# Patient Record
Sex: MALE | Race: WHITE | NOT HISPANIC OR LATINO | Employment: FULL TIME | URBAN - METROPOLITAN AREA
[De-identification: names, ages, dates, MRNs, and addresses within clinical notes are randomized per-mention and may not be internally consistent; named-entity substitution may affect disease eponyms.]

---

## 2018-10-29 ENCOUNTER — HOSPITAL ENCOUNTER (OUTPATIENT)
Dept: RADIOLOGY | Facility: HOSPITAL | Age: 38
Discharge: HOME | End: 2018-10-29
Attending: FAMILY MEDICINE
Payer: COMMERCIAL

## 2018-10-29 ENCOUNTER — OFFICE VISIT (OUTPATIENT)
Dept: URGENT CARE | Facility: CLINIC | Age: 38
End: 2018-10-29
Payer: COMMERCIAL

## 2018-10-29 VITALS
HEART RATE: 76 BPM | DIASTOLIC BLOOD PRESSURE: 67 MMHG | HEIGHT: 73 IN | WEIGHT: 160 LBS | SYSTOLIC BLOOD PRESSURE: 109 MMHG | TEMPERATURE: 98 F | BODY MASS INDEX: 21.2 KG/M2 | OXYGEN SATURATION: 98 %

## 2018-10-29 DIAGNOSIS — R10.31 RIGHT GROIN PAIN: ICD-10-CM

## 2018-10-29 DIAGNOSIS — Z76.89 ENCOUNTER TO ESTABLISH CARE: ICD-10-CM

## 2018-10-29 DIAGNOSIS — R10.31 RIGHT GROIN PAIN: Primary | ICD-10-CM

## 2018-10-29 PROCEDURE — 99203 OFFICE O/P NEW LOW 30 MIN: CPT | Mod: S$GLB,,, | Performed by: FAMILY MEDICINE

## 2018-10-29 PROCEDURE — 76705 ECHO EXAM OF ABDOMEN: CPT | Mod: TC

## 2018-10-29 PROCEDURE — 3008F BODY MASS INDEX DOCD: CPT | Mod: CPTII,S$GLB,, | Performed by: FAMILY MEDICINE

## 2018-10-29 PROCEDURE — 76705 ECHO EXAM OF ABDOMEN: CPT | Mod: 26,,, | Performed by: INTERNAL MEDICINE

## 2018-10-29 NOTE — PATIENT INSTRUCTIONS
Groin Strain (Adult)     A groin strain is a stretching or partial tearing of the muscle in the lower abdomen or upper thigh. This may happen because of too much coughing, heavy lifting, or active sports. The pain may last for several days to weeks, depending on how bad the stretch or tear is. It will generally get better with rest, ice, and anti-inflammatory medicines.  A groin strain can lead to a groin hernia. This is also called an inguinal hernia. A hernia is a complete tear of the abdominal muscle. This allows fat or the intestines to bulge out and create a visible bump just above the thigh crease. This is a more serious problem and may need surgery to repair it. When you lie down, the bump should get smaller or disappear completely. If it doesnt, and you are not able to flatten it with your hand, you need medical attention right away.  Home care  · Avoid heavy lifting, straining, or any activities that cause groin pain.  · You may use over-the-counter pain medicine to control pain, unless another pain medicine was prescribed. If you have chronic liver or kidney disease or ever had a stomach ulcer or GI bleeding, talk with your healthcare provider before using these medicines.  Follow-up care  Follow up with your healthcare provider, or as advised. Make an appointment with your healthcare provider if you develop a bump in the area of the groin strain.  When to seek medical advice  Call your healthcare provider right away if any of these occur:  · Increasing pain in the area of the groin strain  · Tender bump just above the groin crease that does not flatten when you lie down or press on it  · Overall abdominal swelling or pain  · Fever of 100.4°F (38°C) or above lasting for 24 to 48 hours  · Repeated vomiting  · Pain that moves to the lower right abdomen, just below the waistline, or spreads to the back  Date Last Reviewed: 11/19/2015  © 1591-5576 [email or web address] [address] [place] [address], [place] [address]. All rights reserved. This information is not intended as a substitute for professional medical care. Always follow your healthcare professional's instructions.    Follow up with your doctor in a few days as needed.  Return to the urgent care or go to the ER if symptoms get worse.    Jaleel Lane MD

## 2018-10-29 NOTE — PROGRESS NOTES
"Subjective:       Patient ID: Carlos Jacobson is a 38 y.o. male.    Vitals:  height is 6' 1" (1.854 m) and weight is 72.6 kg (160 lb). His temperature is 98.1 °F (36.7 °C). His blood pressure is 109/67 and his pulse is 76. His oxygen saturation is 98%.     Chief Complaint: Hernia (rt femoral area)    Mass   This is a new problem. Episode onset: 3 days ago found lump inner thigh. The problem occurs constantly. Pertinent negatives include no abdominal pain, chest pain, chills, fever, headaches, nausea, rash, sore throat or vomiting. Associated symptoms comments: Felt like a muscle pull.. The symptoms are aggravated by walking and bending (sensitive to touch.). He has tried nothing for the symptoms. The treatment provided no relief.     Review of Systems   Constitution: Negative for chills and fever.   HENT: Negative for sore throat.    Eyes: Negative for blurred vision.   Cardiovascular: Negative for chest pain.   Respiratory: Negative for shortness of breath.    Skin: Negative for rash.   Musculoskeletal: Negative for back pain and joint pain.   Gastrointestinal: Negative for abdominal pain, diarrhea, nausea and vomiting.   Neurological: Negative for headaches.   Psychiatric/Behavioral: The patient is not nervous/anxious.        Objective:      Physical Exam   Constitutional: He is oriented to person, place, and time. He appears well-developed and well-nourished.   HENT:   Head: Normocephalic and atraumatic.   Eyes: EOM are normal. Pupils are equal, round, and reactive to light.   Neck: Normal range of motion. Neck supple. No JVD present. No tracheal deviation present. No thyromegaly present.   Cardiovascular: Normal rate, regular rhythm and normal heart sounds. Exam reveals no gallop and no friction rub.   No murmur heard.  Pulmonary/Chest: Breath sounds normal. No respiratory distress. He has no wheezes. He has no rales. He exhibits no tenderness.   Abdominal: Soft. Bowel sounds are normal. He exhibits no " distension. There is no tenderness. There is no rebound and no guarding.   Right groin tenderness, no swelling, no redness, no bruise.   Musculoskeletal: Normal range of motion. He exhibits no edema, tenderness or deformity.   Lymphadenopathy:     He has no cervical adenopathy.   Neurological: He is alert and oriented to person, place, and time. He displays normal reflexes. No cranial nerve deficit. He exhibits normal muscle tone. Coordination normal.   Skin: Skin is warm. Capillary refill takes less than 2 seconds. No rash noted. No erythema. No pallor.   Psychiatric: He has a normal mood and affect. His behavior is normal. Judgment and thought content normal.   Vitals reviewed.      Assessment:       1. Right groin pain    2. Encounter to establish care        Plan:         Right groin pain  -     US Abdomen Limited; Future; Expected date: 10/29/2018    Encounter to establish care  -     Ambulatory consult to Family Practice          Patient Instructions     Groin Strain (Adult)     A groin strain is a stretching or partial tearing of the muscle in the lower abdomen or upper thigh. This may happen because of too much coughing, heavy lifting, or active sports. The pain may last for several days to weeks, depending on how bad the stretch or tear is. It will generally get better with rest, ice, and anti-inflammatory medicines.  A groin strain can lead to a groin hernia. This is also called an inguinal hernia. A hernia is a complete tear of the abdominal muscle. This allows fat or the intestines to bulge out and create a visible bump just above the thigh crease. This is a more serious problem and may need surgery to repair it. When you lie down, the bump should get smaller or disappear completely. If it doesnt, and you are not able to flatten it with your hand, you need medical attention right away.  Home care  · Avoid heavy lifting, straining, or any activities that cause groin pain.  · You may use over-the-counter  pain medicine to control pain, unless another pain medicine was prescribed. If you have chronic liver or kidney disease or ever had a stomach ulcer or GI bleeding, talk with your healthcare provider before using these medicines.  Follow-up care  Follow up with your healthcare provider, or as advised. Make an appointment with your healthcare provider if you develop a bump in the area of the groin strain.  When to seek medical advice  Call your healthcare provider right away if any of these occur:  · Increasing pain in the area of the groin strain  · Tender bump just above the groin crease that does not flatten when you lie down or press on it  · Overall abdominal swelling or pain  · Fever of 100.4°F (38°C) or above lasting for 24 to 48 hours  · Repeated vomiting  · Pain that moves to the lower right abdomen, just below the waistline, or spreads to the back  Date Last Reviewed: 11/19/2015  © 5352-7952 [employer]. [address] [place] [address], [place] [address]. All rights reserved. This information is not intended as a substitute for professional medical care. Always follow your healthcare professional's instructions.    Follow up with your doctor in a few days as needed.  Return to the urgent care or go to the ER if symptoms get worse.    Jaleel Lane MD

## 2020-12-11 ENCOUNTER — OFFICE VISIT (OUTPATIENT)
Dept: URGENT CARE | Facility: CLINIC | Age: 40
End: 2020-12-11
Payer: COMMERCIAL

## 2020-12-11 VITALS
SYSTOLIC BLOOD PRESSURE: 113 MMHG | HEART RATE: 60 BPM | TEMPERATURE: 98 F | HEIGHT: 73 IN | BODY MASS INDEX: 21.2 KG/M2 | WEIGHT: 160 LBS | DIASTOLIC BLOOD PRESSURE: 63 MMHG | OXYGEN SATURATION: 97 % | RESPIRATION RATE: 18 BRPM

## 2020-12-11 DIAGNOSIS — Z11.59 ENCOUNTER FOR SCREENING FOR OTHER VIRAL DISEASES: Primary | ICD-10-CM

## 2020-12-11 LAB — SARS-COV-2 IGG SERPLBLD QL IA.RAPID: NEGATIVE

## 2020-12-11 PROCEDURE — 99211 PR OFFICE/OUTPT VISIT, EST, LEVL I: ICD-10-PCS | Mod: S$GLB,,, | Performed by: NURSE PRACTITIONER

## 2020-12-11 PROCEDURE — 86769 SARS-COV-2 COVID-19 ANTIBODY: CPT

## 2020-12-11 PROCEDURE — 99211 OFF/OP EST MAY X REQ PHY/QHP: CPT | Mod: S$GLB,,, | Performed by: NURSE PRACTITIONER

## 2020-12-11 RX ORDER — ESCITALOPRAM OXALATE 5 MG/1
5 TABLET ORAL DAILY
COMMUNITY
Start: 2020-12-08 | End: 2022-10-17 | Stop reason: CLARIF

## 2020-12-11 NOTE — PATIENT INSTRUCTIONS
ANTIBODY TESTING           What does the antibody test tell me?  The antibody test is a blood test that determines if a persons immune system has created antibodies in  response to COVID-19. Presence of the antibody indicates the individual has been previously infected  with COVID-19.  It is important to note that this test does not prove that a person is immune to future infection with  COVID-19. Because this virus is new, there is not enough information at this time to determine what  defines COVID-19 immunity and how long immunity may last.  We understand a test like this could create a false sense of security. It is critical that everyone,  regardless of test status or result, continues to follow the latest social distancing, PPE protection and  infection control measures.     Those with a positive test should be aware that they have been infected. These individuals are still  required to wear appropriate PPE as advised throughout this COVID-19 pandemic. Those with a negative  test should be aware that they have not been exposed or developed antibodies to COVID-19. They  should maintain the guidance on appropriate PPE as advised throughout this COVID-19 pandemic.           What is the turnaround time of the antibody test?  The antibody test results are usually provided within 24-36 hours of collection, depending on the testing  Location.           Nasir heard that these tests are unreliable?  This test has been vetted through our infectious disease and pathology leadership. This test has not  been approved by the U.S. Food and Drug Administration (FDA). This test is currently commercially  available under the Emergency Use Authorization, meaning that the FDA is allowing it during this public  health emergency. The COVID-19 virus is new and there is no perfect test.           If my antibody test is positive, is there any medication or treatment I should seek?  No, at this time, there is no definitive therapy  "being recommended or used for patients with positive  antibody test. Regardless of test status or result, you should continue to follow the latest social  distancing, PPE protection and infection control measures.        How will I get my results?  Results will sent to your [facility] patient portal where you can view them. You can download the "SaveMeeting" steffany in your smart phone's Steffany store.  You can also visit  [email or web address]  to set up your portal. You may contact [email or web address] or   [facility] Support Line at [phone] for assistance.       "

## 2020-12-11 NOTE — PROGRESS NOTES
"Subjective:       Patient ID: Carlos Jacobson is a 40 y.o. male.    Vitals:  height is 6' 1" (1.854 m) and weight is 72.6 kg (160 lb). His temperature is 98.2 °F (36.8 °C). His blood pressure is 113/63 and his pulse is 60. His respiration is 18 and oxygen saturation is 97%.     Chief Complaint: COVID-19 Concerns (Anitbody test)    Pt here for Antibody test. Was exposed 3 weeks ago. No symptoms today    ROS    Objective:      Physical Exam    Counseled patient and answered questions in regards to COVID-19 testing and diagnosis.  Assessment:       1. Encounter for screening for other viral diseases        Plan:         Encounter for screening for other viral diseases  -     COVID-19 (SARS CoV-2) IgG Antibody           "

## 2021-04-28 ENCOUNTER — PATIENT MESSAGE (OUTPATIENT)
Dept: RESEARCH | Facility: HOSPITAL | Age: 41
End: 2021-04-28

## 2022-10-17 ENCOUNTER — OFFICE VISIT (OUTPATIENT)
Dept: PRIMARY CARE CLINIC | Facility: CLINIC | Age: 42
End: 2022-10-17
Payer: COMMERCIAL

## 2022-10-17 VITALS
HEIGHT: 73 IN | BODY MASS INDEX: 23.43 KG/M2 | TEMPERATURE: 98 F | HEART RATE: 64 BPM | DIASTOLIC BLOOD PRESSURE: 72 MMHG | SYSTOLIC BLOOD PRESSURE: 106 MMHG | WEIGHT: 176.81 LBS | OXYGEN SATURATION: 97 % | RESPIRATION RATE: 16 BRPM

## 2022-10-17 DIAGNOSIS — Z11.4 SCREENING FOR HIV (HUMAN IMMUNODEFICIENCY VIRUS): ICD-10-CM

## 2022-10-17 DIAGNOSIS — F41.1 GAD (GENERALIZED ANXIETY DISORDER): ICD-10-CM

## 2022-10-17 DIAGNOSIS — Z00.00 ANNUAL PHYSICAL EXAM: Primary | ICD-10-CM

## 2022-10-17 DIAGNOSIS — Z13.6 ENCOUNTER FOR SCREENING FOR CARDIOVASCULAR DISORDERS: ICD-10-CM

## 2022-10-17 DIAGNOSIS — Z23 NEED FOR VACCINATION: ICD-10-CM

## 2022-10-17 PROCEDURE — 1159F PR MEDICATION LIST DOCUMENTED IN MEDICAL RECORD: ICD-10-PCS | Mod: CPTII,S$GLB,, | Performed by: FAMILY MEDICINE

## 2022-10-17 PROCEDURE — 99386 PREV VISIT NEW AGE 40-64: CPT | Mod: 25,S$GLB,, | Performed by: FAMILY MEDICINE

## 2022-10-17 PROCEDURE — 3078F DIAST BP <80 MM HG: CPT | Mod: CPTII,S$GLB,, | Performed by: FAMILY MEDICINE

## 2022-10-17 PROCEDURE — 99999 PR PBB SHADOW E&M-EST. PATIENT-LVL IV: CPT | Mod: PBBFAC,,, | Performed by: FAMILY MEDICINE

## 2022-10-17 PROCEDURE — 90472 TDAP VACCINE GREATER THAN OR EQUAL TO 7YO IM: ICD-10-PCS | Mod: S$GLB,,, | Performed by: FAMILY MEDICINE

## 2022-10-17 PROCEDURE — 90471 FLU VACCINE (QUAD) GREATER THAN OR EQUAL TO 3YO PRESERVATIVE FREE IM: ICD-10-PCS | Mod: S$GLB,,, | Performed by: FAMILY MEDICINE

## 2022-10-17 PROCEDURE — 90686 IIV4 VACC NO PRSV 0.5 ML IM: CPT | Mod: S$GLB,,, | Performed by: FAMILY MEDICINE

## 2022-10-17 PROCEDURE — 99999 PR PBB SHADOW E&M-EST. PATIENT-LVL IV: ICD-10-PCS | Mod: PBBFAC,,, | Performed by: FAMILY MEDICINE

## 2022-10-17 PROCEDURE — 3074F PR MOST RECENT SYSTOLIC BLOOD PRESSURE < 130 MM HG: ICD-10-PCS | Mod: CPTII,S$GLB,, | Performed by: FAMILY MEDICINE

## 2022-10-17 PROCEDURE — 99386 PR PREVENTIVE VISIT,NEW,40-64: ICD-10-PCS | Mod: 25,S$GLB,, | Performed by: FAMILY MEDICINE

## 2022-10-17 PROCEDURE — 3078F PR MOST RECENT DIASTOLIC BLOOD PRESSURE < 80 MM HG: ICD-10-PCS | Mod: CPTII,S$GLB,, | Performed by: FAMILY MEDICINE

## 2022-10-17 PROCEDURE — 1160F PR REVIEW ALL MEDS BY PRESCRIBER/CLIN PHARMACIST DOCUMENTED: ICD-10-PCS | Mod: CPTII,S$GLB,, | Performed by: FAMILY MEDICINE

## 2022-10-17 PROCEDURE — 90715 TDAP VACCINE GREATER THAN OR EQUAL TO 7YO IM: ICD-10-PCS | Mod: S$GLB,,, | Performed by: FAMILY MEDICINE

## 2022-10-17 PROCEDURE — 93005 EKG 12-LEAD: ICD-10-PCS | Mod: S$GLB,,, | Performed by: FAMILY MEDICINE

## 2022-10-17 PROCEDURE — 93010 ELECTROCARDIOGRAM REPORT: CPT | Mod: S$GLB,,, | Performed by: INTERNAL MEDICINE

## 2022-10-17 PROCEDURE — 93010 EKG 12-LEAD: ICD-10-PCS | Mod: S$GLB,,, | Performed by: INTERNAL MEDICINE

## 2022-10-17 PROCEDURE — 1160F RVW MEDS BY RX/DR IN RCRD: CPT | Mod: CPTII,S$GLB,, | Performed by: FAMILY MEDICINE

## 2022-10-17 PROCEDURE — 93005 ELECTROCARDIOGRAM TRACING: CPT | Mod: S$GLB,,, | Performed by: FAMILY MEDICINE

## 2022-10-17 PROCEDURE — 90715 TDAP VACCINE 7 YRS/> IM: CPT | Mod: S$GLB,,, | Performed by: FAMILY MEDICINE

## 2022-10-17 PROCEDURE — 90471 IMMUNIZATION ADMIN: CPT | Mod: S$GLB,,, | Performed by: FAMILY MEDICINE

## 2022-10-17 PROCEDURE — 1159F MED LIST DOCD IN RCRD: CPT | Mod: CPTII,S$GLB,, | Performed by: FAMILY MEDICINE

## 2022-10-17 PROCEDURE — 90686 FLU VACCINE (QUAD) GREATER THAN OR EQUAL TO 3YO PRESERVATIVE FREE IM: ICD-10-PCS | Mod: S$GLB,,, | Performed by: FAMILY MEDICINE

## 2022-10-17 PROCEDURE — 3074F SYST BP LT 130 MM HG: CPT | Mod: CPTII,S$GLB,, | Performed by: FAMILY MEDICINE

## 2022-10-17 PROCEDURE — 90472 IMMUNIZATION ADMIN EACH ADD: CPT | Mod: S$GLB,,, | Performed by: FAMILY MEDICINE

## 2022-10-17 RX ORDER — IBUPROFEN 100 MG/5ML
1000 SUSPENSION, ORAL (FINAL DOSE FORM) ORAL DAILY
COMMUNITY

## 2022-10-17 RX ORDER — ESCITALOPRAM OXALATE 10 MG/1
15 TABLET ORAL DAILY
COMMUNITY
End: 2022-10-17 | Stop reason: SDUPTHER

## 2022-10-17 RX ORDER — ESCITALOPRAM OXALATE 10 MG/1
15 TABLET ORAL DAILY
Qty: 135 TABLET | Refills: 4 | Status: SHIPPED | OUTPATIENT
Start: 2022-10-17

## 2022-10-17 RX ORDER — MULTIVITAMIN
1 TABLET ORAL DAILY
COMMUNITY

## 2022-10-17 NOTE — PROGRESS NOTES
"Subjective:       Patient ID: Carlos Jacobson is a 42 y.o. male.    Chief Complaint: Establish Care    Here to establish care.  Has not had a checkup in a little over a year.  Has been stable on current dose of Lexapro, originally on 10 milligrams, feels better on 15 milligrams daily.  No adverse side effects.  No recent illness or injury.  Remote history of C diff colitis in 2014, no issues since then.    Review of Systems   Constitutional:  Negative for chills, fatigue and fever.   HENT:  Negative for congestion.    Eyes:  Negative for visual disturbance.   Respiratory:  Negative for cough and shortness of breath.    Cardiovascular:  Negative for chest pain.   Gastrointestinal:  Negative for abdominal pain, nausea and vomiting.   Genitourinary:  Negative for difficulty urinating.   Musculoskeletal:  Negative for arthralgias.   Skin:  Negative for rash.   Allergic/Immunologic: Negative for immunocompromised state.   Neurological:  Negative for dizziness.   Hematological:  Does not bruise/bleed easily.   Psychiatric/Behavioral:  Negative for sleep disturbance.      Objective:      Vitals:    10/17/22 1331   BP: 106/72   BP Location: Right arm   Patient Position: Sitting   BP Method: Medium (Manual)   Pulse: 64   Resp: 16   Temp: 98.2 °F (36.8 °C)   TempSrc: Temporal   SpO2: 97%   Weight: 80.2 kg (176 lb 12.9 oz)   Height: 6' 1" (1.854 m)     Physical Exam  Vitals and nursing note reviewed.   Constitutional:       General: He is not in acute distress.     Appearance: Normal appearance. He is well-developed.   HENT:      Head: Normocephalic and atraumatic.      Right Ear: Tympanic membrane and external ear normal.      Left Ear: Tympanic membrane and external ear normal.      Mouth/Throat:      Mouth: Mucous membranes are moist.      Pharynx: Oropharynx is clear.   Neck:      Vascular: No carotid bruit.   Cardiovascular:      Rate and Rhythm: Normal rate and regular rhythm.      Heart sounds: Normal heart sounds. "   Pulmonary:      Effort: Pulmonary effort is normal.      Breath sounds: Normal breath sounds.   Musculoskeletal:      Right lower leg: No edema.      Left lower leg: No edema.   Skin:     General: Skin is warm and dry.   Neurological:      Mental Status: He is alert and oriented to person, place, and time.   Psychiatric:         Mood and Affect: Mood normal.         Behavior: Behavior normal.       Lab Results   Component Value Date    WBC 4.80 04/29/2014    HGB 13.0 (L) 04/29/2014    HCT 38.3 (L) 04/29/2014     04/29/2014    ALT 26 04/26/2014    AST 18 04/26/2014     04/29/2014    K 3.5 04/29/2014     04/29/2014    CREATININE 0.8 04/29/2014    BUN 6 04/29/2014    CO2 27 04/29/2014    INR 1.0 04/26/2014      Assessment:       1. Annual physical exam    2. SUKI (generalized anxiety disorder)    3. Screening for HIV (human immunodeficiency virus)    4. Need for vaccination    5. Encounter for screening for cardiovascular disorders          Plan:       Annual physical exam  -     CBC Auto Differential; Future; Expected date: 10/17/2022  -     Comprehensive Metabolic Panel; Future; Expected date: 10/17/2022  -     Lipid Panel; Future; Expected date: 10/17/2022  -     TSH; Future; Expected date: 10/17/2022  -     HIV 1/2 Ag/Ab (4th Gen); Future; Expected date: 10/17/2022  -     EKG 12-lead  Sinus bradycardia on EKG, otherwise normal exam.  SUKI (generalized anxiety disorder)  -     EScitalopram oxalate (LEXAPRO) 10 MG tablet; Take 1.5 tablets (15 mg total) by mouth once daily.  Dispense: 135 tablet; Refill: 4    Screening for HIV (human immunodeficiency virus)    Need for vaccination  -     Influenza - Quadrivalent *Preferred* (6 months+) (PF)  -     Tdap Vaccine    Encounter for screening for cardiovascular disorders  -     EKG 12-lead    Medication List with Changes/Refills   Current Medications    ACIDOPHILUS (LACTINEX) 100 MILLION CELL PACKET    Take 1 packet (1 each total) by mouth 2 (two) times  daily.    ASCORBIC ACID, VITAMIN C, (VITAMIN C) 1000 MG TABLET    Take 1,000 mg by mouth once daily.    MULTIVITAMIN (ONE DAILY MULTIVITAMIN) PER TABLET    Take 1 tablet by mouth once daily.   Changed and/or Refilled Medications    Modified Medication Previous Medication    ESCITALOPRAM OXALATE (LEXAPRO) 10 MG TABLET EScitalopram oxalate (LEXAPRO) 10 MG tablet       Take 1.5 tablets (15 mg total) by mouth once daily.    Take 15 mg by mouth once daily.   Discontinued Medications    ESCITALOPRAM OXALATE (LEXAPRO) 5 MG TAB    Take 5 mg by mouth once daily.    HYOSCYAMINE (ANASPAZ,LEVSIN) 0.125 MG TAB    Take 125 mcg by mouth every 4 (four) hours as needed.    ONDANSETRON (ZOFRAN) 4 MG TABLET    Take 8 mg by mouth every 8 (eight) hours.

## 2022-10-17 NOTE — PROGRESS NOTES
Verified pt ID using name and . Influenza vaccine Administered Im in right delt per physician order using aseptic technique.  Pt tolerated well with no adverse reactions noted.   Verified pt ID using name and . TDAP Vaccine Administered IM in left delt per physician order using aseptic technique.  Pt tolerated well with no adverse reactions noted.

## 2023-05-23 ENCOUNTER — TELEPHONE (OUTPATIENT)
Dept: PRIMARY CARE CLINIC | Facility: CLINIC | Age: 43
End: 2023-05-23
Payer: COMMERCIAL

## 2023-05-23 DIAGNOSIS — D49.6 BRAIN TUMOR: Primary | ICD-10-CM

## 2023-05-23 NOTE — TELEPHONE ENCOUNTER
Patient called to notify me that he has been having intermittent episodes of left foot numbness and tunnel vision for the past month or so.  Went to an urgent care in [place] for further evaluation.  Head CT was done which reportedly showed some type of brain tumor.  He will e-mail a copy of the report and we will proceed from there.

## 2023-05-24 ENCOUNTER — LAB VISIT (OUTPATIENT)
Dept: LAB | Facility: HOSPITAL | Age: 43
End: 2023-05-24
Attending: FAMILY MEDICINE
Payer: COMMERCIAL

## 2023-05-24 DIAGNOSIS — Z13.6 ENCOUNTER FOR SCREENING FOR CARDIOVASCULAR DISORDERS: ICD-10-CM

## 2023-05-24 DIAGNOSIS — Z00.00 ANNUAL PHYSICAL EXAM: ICD-10-CM

## 2023-05-24 DIAGNOSIS — Z11.4 SCREENING FOR HIV (HUMAN IMMUNODEFICIENCY VIRUS): ICD-10-CM

## 2023-05-24 LAB
ALBUMIN SERPL BCP-MCNC: 4.2 G/DL (ref 3.5–5.2)
ALP SERPL-CCNC: 60 U/L (ref 55–135)
ALT SERPL W/O P-5'-P-CCNC: 21 U/L (ref 10–44)
ANION GAP SERPL CALC-SCNC: 11 MMOL/L (ref 8–16)
AST SERPL-CCNC: 24 U/L (ref 10–40)
BASOPHILS # BLD AUTO: 0.02 K/UL (ref 0–0.2)
BASOPHILS NFR BLD: 0.5 % (ref 0–1.9)
BILIRUB SERPL-MCNC: 0.5 MG/DL (ref 0.1–1)
BUN SERPL-MCNC: 12 MG/DL (ref 6–20)
CALCIUM SERPL-MCNC: 10.1 MG/DL (ref 8.7–10.5)
CHLORIDE SERPL-SCNC: 104 MMOL/L (ref 95–110)
CHOLEST SERPL-MCNC: 239 MG/DL (ref 120–199)
CHOLEST/HDLC SERPL: 5.7 {RATIO} (ref 2–5)
CO2 SERPL-SCNC: 24 MMOL/L (ref 23–29)
CREAT SERPL-MCNC: 0.9 MG/DL (ref 0.5–1.4)
DIFFERENTIAL METHOD: NORMAL
EOSINOPHIL # BLD AUTO: 0 K/UL (ref 0–0.5)
EOSINOPHIL NFR BLD: 0.7 % (ref 0–8)
ERYTHROCYTE [DISTWIDTH] IN BLOOD BY AUTOMATED COUNT: 12.2 % (ref 11.5–14.5)
EST. GFR  (NO RACE VARIABLE): >60 ML/MIN/1.73 M^2
GLUCOSE SERPL-MCNC: 100 MG/DL (ref 70–110)
HCT VFR BLD AUTO: 46 % (ref 40–54)
HDLC SERPL-MCNC: 42 MG/DL (ref 40–75)
HDLC SERPL: 17.6 % (ref 20–50)
HGB BLD-MCNC: 15.4 G/DL (ref 14–18)
HIV 1+2 AB+HIV1 P24 AG SERPL QL IA: NORMAL
IMM GRANULOCYTES # BLD AUTO: 0.01 K/UL (ref 0–0.04)
IMM GRANULOCYTES NFR BLD AUTO: 0.2 % (ref 0–0.5)
LDLC SERPL CALC-MCNC: 155 MG/DL (ref 63–159)
LYMPHOCYTES # BLD AUTO: 1.5 K/UL (ref 1–4.8)
LYMPHOCYTES NFR BLD: 36.7 % (ref 18–48)
MCH RBC QN AUTO: 30.4 PG (ref 27–31)
MCHC RBC AUTO-ENTMCNC: 33.5 G/DL (ref 32–36)
MCV RBC AUTO: 91 FL (ref 82–98)
MONOCYTES # BLD AUTO: 0.4 K/UL (ref 0.3–1)
MONOCYTES NFR BLD: 9 % (ref 4–15)
NEUTROPHILS # BLD AUTO: 2.2 K/UL (ref 1.8–7.7)
NEUTROPHILS NFR BLD: 52.9 % (ref 38–73)
NONHDLC SERPL-MCNC: 197 MG/DL
NRBC BLD-RTO: 0 /100 WBC
PLATELET # BLD AUTO: 268 K/UL (ref 150–450)
PMV BLD AUTO: 10.3 FL (ref 9.2–12.9)
POTASSIUM SERPL-SCNC: 5.1 MMOL/L (ref 3.5–5.1)
PROT SERPL-MCNC: 7.6 G/DL (ref 6–8.4)
RBC # BLD AUTO: 5.06 M/UL (ref 4.6–6.2)
SODIUM SERPL-SCNC: 139 MMOL/L (ref 136–145)
TRIGL SERPL-MCNC: 210 MG/DL (ref 30–150)
TSH SERPL DL<=0.005 MIU/L-ACNC: 1.52 UIU/ML (ref 0.4–4)
WBC # BLD AUTO: 4.12 K/UL (ref 3.9–12.7)

## 2023-05-24 PROCEDURE — 80053 COMPREHEN METABOLIC PANEL: CPT | Performed by: FAMILY MEDICINE

## 2023-05-24 PROCEDURE — 85025 COMPLETE CBC W/AUTO DIFF WBC: CPT | Performed by: FAMILY MEDICINE

## 2023-05-24 PROCEDURE — 36415 COLL VENOUS BLD VENIPUNCTURE: CPT | Mod: PO | Performed by: FAMILY MEDICINE

## 2023-05-24 PROCEDURE — 84443 ASSAY THYROID STIM HORMONE: CPT | Performed by: FAMILY MEDICINE

## 2023-05-24 PROCEDURE — 87389 HIV-1 AG W/HIV-1&-2 AB AG IA: CPT | Performed by: FAMILY MEDICINE

## 2023-05-24 PROCEDURE — 80061 LIPID PANEL: CPT | Performed by: FAMILY MEDICINE

## 2023-05-24 NOTE — TELEPHONE ENCOUNTER
Head CT report uploaded to the chart.  MRI brain and CT of chest and abdomen ordered.  Needs imaging and fasting labs, as well as consultation with Neurosurgery after imaging is done

## 2023-05-26 ENCOUNTER — HOSPITAL ENCOUNTER (OUTPATIENT)
Dept: RADIOLOGY | Facility: HOSPITAL | Age: 43
Discharge: HOME | End: 2023-05-26
Attending: FAMILY MEDICINE
Payer: COMMERCIAL

## 2023-05-26 ENCOUNTER — ANCILLARY ORDERS (OUTPATIENT)
Dept: PRIMARY CARE CLINIC | Facility: CLINIC | Age: 43
End: 2023-05-26
Payer: COMMERCIAL

## 2023-05-26 DIAGNOSIS — N28.89 LEFT KIDNEY MASS: Primary | ICD-10-CM

## 2023-05-26 DIAGNOSIS — D49.6 BRAIN TUMOR: ICD-10-CM

## 2023-05-26 PROCEDURE — 74177 CT ABD & PELVIS W/CONTRAST: CPT | Mod: 26,,, | Performed by: RADIOLOGY

## 2023-05-26 PROCEDURE — 71260 CT THORAX DX C+: CPT | Mod: TC

## 2023-05-26 PROCEDURE — 70553 MRI BRAIN STEM W/O & W/DYE: CPT | Mod: 26,,, | Performed by: RADIOLOGY

## 2023-05-26 PROCEDURE — 71260 CT THORAX DX C+: CPT | Mod: 26,,, | Performed by: RADIOLOGY

## 2023-05-26 PROCEDURE — 25500020 PHARM REV CODE 255: Performed by: FAMILY MEDICINE

## 2023-05-26 PROCEDURE — 71260 CT CHEST ABDOMEN PELVIS WITH CONTRAST (XPD): ICD-10-PCS | Mod: 26,,, | Performed by: RADIOLOGY

## 2023-05-26 PROCEDURE — A9585 GADOBUTROL INJECTION: HCPCS | Performed by: FAMILY MEDICINE

## 2023-05-26 PROCEDURE — 74177 CT CHEST ABDOMEN PELVIS WITH CONTRAST (XPD): ICD-10-PCS | Mod: 26,,, | Performed by: RADIOLOGY

## 2023-05-26 PROCEDURE — 74177 CT ABD & PELVIS W/CONTRAST: CPT | Mod: TC

## 2023-05-26 PROCEDURE — 70553 MRI BRAIN W WO CONTRAST: ICD-10-PCS | Mod: 26,,, | Performed by: RADIOLOGY

## 2023-05-26 PROCEDURE — 70553 MRI BRAIN STEM W/O & W/DYE: CPT | Mod: TC

## 2023-05-26 RX ORDER — GADOBUTROL 604.72 MG/ML
8 INJECTION INTRAVENOUS
Status: COMPLETED | OUTPATIENT
Start: 2023-05-26 | End: 2023-05-26

## 2023-05-26 RX ADMIN — IOHEXOL 100 ML: 350 INJECTION, SOLUTION INTRAVENOUS at 09:05

## 2023-05-26 RX ADMIN — GADOBUTROL 8 ML: 604.72 INJECTION INTRAVENOUS at 10:05

## 2023-05-29 ENCOUNTER — OFFICE VISIT (OUTPATIENT)
Dept: UROLOGY | Facility: CLINIC | Age: 43
End: 2023-05-29
Payer: COMMERCIAL

## 2023-05-29 VITALS
SYSTOLIC BLOOD PRESSURE: 105 MMHG | WEIGHT: 170 LBS | DIASTOLIC BLOOD PRESSURE: 71 MMHG | BODY MASS INDEX: 22.53 KG/M2 | HEIGHT: 73 IN

## 2023-05-29 DIAGNOSIS — D49.6 BRAIN TUMOR: ICD-10-CM

## 2023-05-29 DIAGNOSIS — N28.89 LEFT KIDNEY MASS: ICD-10-CM

## 2023-05-29 DIAGNOSIS — N28.89 OTHER SPECIFIED DISORDERS OF KIDNEY AND URETER: Primary | ICD-10-CM

## 2023-05-29 PROCEDURE — 99999 PR PBB SHADOW E&M-EST. PATIENT-LVL IV: ICD-10-PCS | Mod: PBBFAC,,, | Performed by: NURSE PRACTITIONER

## 2023-05-29 PROCEDURE — 99204 OFFICE O/P NEW MOD 45 MIN: CPT | Mod: S$GLB,,, | Performed by: NURSE PRACTITIONER

## 2023-05-29 PROCEDURE — 99204 PR OFFICE/OUTPT VISIT, NEW, LEVL IV, 45-59 MIN: ICD-10-PCS | Mod: S$GLB,,, | Performed by: NURSE PRACTITIONER

## 2023-05-29 PROCEDURE — 1159F MED LIST DOCD IN RCRD: CPT | Mod: CPTII,S$GLB,, | Performed by: NURSE PRACTITIONER

## 2023-05-29 PROCEDURE — 99999 PR PBB SHADOW E&M-EST. PATIENT-LVL IV: CPT | Mod: PBBFAC,,, | Performed by: NURSE PRACTITIONER

## 2023-05-29 PROCEDURE — 3008F BODY MASS INDEX DOCD: CPT | Mod: CPTII,S$GLB,, | Performed by: NURSE PRACTITIONER

## 2023-05-29 PROCEDURE — 3074F SYST BP LT 130 MM HG: CPT | Mod: CPTII,S$GLB,, | Performed by: NURSE PRACTITIONER

## 2023-05-29 PROCEDURE — 3078F PR MOST RECENT DIASTOLIC BLOOD PRESSURE < 80 MM HG: ICD-10-PCS | Mod: CPTII,S$GLB,, | Performed by: NURSE PRACTITIONER

## 2023-05-29 PROCEDURE — 3008F PR BODY MASS INDEX (BMI) DOCUMENTED: ICD-10-PCS | Mod: CPTII,S$GLB,, | Performed by: NURSE PRACTITIONER

## 2023-05-29 PROCEDURE — 1159F PR MEDICATION LIST DOCUMENTED IN MEDICAL RECORD: ICD-10-PCS | Mod: CPTII,S$GLB,, | Performed by: NURSE PRACTITIONER

## 2023-05-29 PROCEDURE — 1160F RVW MEDS BY RX/DR IN RCRD: CPT | Mod: CPTII,S$GLB,, | Performed by: NURSE PRACTITIONER

## 2023-05-29 PROCEDURE — 3074F PR MOST RECENT SYSTOLIC BLOOD PRESSURE < 130 MM HG: ICD-10-PCS | Mod: CPTII,S$GLB,, | Performed by: NURSE PRACTITIONER

## 2023-05-29 PROCEDURE — 1160F PR REVIEW ALL MEDS BY PRESCRIBER/CLIN PHARMACIST DOCUMENTED: ICD-10-PCS | Mod: CPTII,S$GLB,, | Performed by: NURSE PRACTITIONER

## 2023-05-29 PROCEDURE — 3078F DIAST BP <80 MM HG: CPT | Mod: CPTII,S$GLB,, | Performed by: NURSE PRACTITIONER

## 2023-05-29 NOTE — PROGRESS NOTES
[facility] Note  Staff: RENUKA BecerraC    PCP: MD Tello    Chief Complaint: Left Kidney Lesion    Subjective:        HPI: Carlos Jacobson is a 42 y.o. male NEW PT presents for consult today regarding new incidental finding of a 1.3 cm heterogeneous lesion of the medial upper pole of left kidney, new since , with an enhancing component.  He is present in office-accompanied by his parents during office visit today.    Pt notified his PCP office on 2023 that he has been having intermittent episodes of left foot numbness and tunnel vision for the past month or so.  Went to an urgent care in [place] for further evaluation.  Head CT was done which reportedly showed some type of brain tumor.      MRI of the Brain and CT Chest Abdomen were performed on 2023.  MRI of the brain showed the followin. Diffuse, infiltrative tumor involving the left posterior cerebral hemisphere center within the temporal and parietal lobes involving the cingulate gyrus and crossing the midline via diffuse infiltration of the posterior corpus callosum.  No appreciable enhancing components or diffusion restriction.  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  Imaging features would be atypical for primary CNS lymphoma.  2. Minimal left-to-right midline shift and partial effacement of the lateral ventricles posteriorly.  No acute process at this time.  Neuro surgical consultation is recommended.  *Pt is scheduled to see Neuro-Surgery tomorrow for further evaluation and intervention at this time.    CT Chest Abdomen Pelvis with Contrast:  IMPRESSION:  Small, enhancing left renal lesion suspicious for neoplasm.  No evidence for metastatic disease.    REVIEW OF SYSTEMS:  A comprehensive 10 system review was performed and is negative except as noted above in HPI    PMHx:  Past Medical History:   Diagnosis Date    Anxiety     Brain tumor     Clostridium difficile  "colitis 04/28/2014    Renal lesion      PSHx:  Past Surgical History:   Procedure Laterality Date    BILATERAL INGUINAL HERNIA REPAIR Bilateral 1987    COLONOSCOPY  2014     Allergies:  Patient has no known allergies.    Medications: reviewed     Objective:     Vitals:    05/29/23 0804   BP: 105/71     General:WDWN in NAD  Eyes: PERRLA, normal conjunctiva  Respiratory: no increased work on breathing, clear to auscultation  Cardiovascular: regular rate and rhythm. No obvious extremity edema.  GI: palpation of masses. No tenderness. No hepatosplenomegaly to palpation.  Musculoskeletal: normal range of motion of bilateral upper extremities. Normal muscle strength and tone.  Skin: no obvious rashes or lesions. No tightening of skin noted.  Neurologic: CN grossly normal. Normal sensation.   Psychiatric: awake, alert and oriented x 3. Mood and affect normal. Cooperative.      Assessment:       1. Brain tumor    2. Left kidney mass          Plan:     I have thoroughly reviewed all imaging with Dr. Paddy Melton-Urologist and our recommendations at this time are the following:  "Reviewed with pt and family today the possibility of small renal mass being a kidney cancer, however given small size and in setting of a current brain tumor, the renal mass can be monitored for now.    We will then set up follow-up with imaging in six months per MD recommendations with review pending disposition from brain tumor management/diagnosis in the future and plan for one of the Urologists to see him back in office in six months after imaging is completed at that time.    Pt and family verbalized understanding.    [facility]: Active    Xena Cisneros, KENIAP-C    "

## 2023-05-30 ENCOUNTER — OFFICE VISIT (OUTPATIENT)
Dept: NEUROSURGERY | Facility: CLINIC | Age: 43
End: 2023-05-30
Payer: COMMERCIAL

## 2023-05-30 VITALS
BODY MASS INDEX: 22.53 KG/M2 | SYSTOLIC BLOOD PRESSURE: 103 MMHG | HEIGHT: 73 IN | DIASTOLIC BLOOD PRESSURE: 68 MMHG | HEART RATE: 73 BPM | WEIGHT: 170 LBS | RESPIRATION RATE: 18 BRPM

## 2023-05-30 DIAGNOSIS — D49.6 BRAIN TUMOR: ICD-10-CM

## 2023-05-30 PROCEDURE — 99205 PR OFFICE/OUTPT VISIT, NEW, LEVL V, 60-74 MIN: ICD-10-PCS | Mod: S$GLB,,, | Performed by: NEUROLOGICAL SURGERY

## 2023-05-30 PROCEDURE — 3078F PR MOST RECENT DIASTOLIC BLOOD PRESSURE < 80 MM HG: ICD-10-PCS | Mod: CPTII,S$GLB,, | Performed by: NEUROLOGICAL SURGERY

## 2023-05-30 PROCEDURE — 99205 OFFICE O/P NEW HI 60 MIN: CPT | Mod: S$GLB,,, | Performed by: NEUROLOGICAL SURGERY

## 2023-05-30 PROCEDURE — 3078F DIAST BP <80 MM HG: CPT | Mod: CPTII,S$GLB,, | Performed by: NEUROLOGICAL SURGERY

## 2023-05-30 PROCEDURE — 3074F SYST BP LT 130 MM HG: CPT | Mod: CPTII,S$GLB,, | Performed by: NEUROLOGICAL SURGERY

## 2023-05-30 PROCEDURE — 3008F PR BODY MASS INDEX (BMI) DOCUMENTED: ICD-10-PCS | Mod: CPTII,S$GLB,, | Performed by: NEUROLOGICAL SURGERY

## 2023-05-30 PROCEDURE — 3074F PR MOST RECENT SYSTOLIC BLOOD PRESSURE < 130 MM HG: ICD-10-PCS | Mod: CPTII,S$GLB,, | Performed by: NEUROLOGICAL SURGERY

## 2023-05-30 PROCEDURE — 3008F BODY MASS INDEX DOCD: CPT | Mod: CPTII,S$GLB,, | Performed by: NEUROLOGICAL SURGERY

## 2023-05-30 NOTE — PROGRESS NOTES
Neurosurgery History & Physical    Patient ID: Carlos Jacobson is a 42 y.o. male.    Chief Complaint   Patient presents with    Brain Tumor       HPI:  Mr. Jacobson is a 42 year old male who presents today for evaluation of recent brain imaging. Over the last 6 months, he reports pressure in his head and tinnitus at times. He has associated these symptoms with anxiety which he has struggled with over the last few years. He reports two episodes of left foot numbness and tunnel vision in recent weeks which lead him to seek evaluation at the urgent care. A CT was performed there which showed a brain lesion and PCP ordered MRI brain and CT C/A/P.     He feels that he has trouble with coordination when walking down stairs. Otherwise denies other neurologic symptom.     He has seen Urology regarding CT CAP which demonstrates small left renal lesion. They recommended follow up in 3 months for monitoring.     Review of Systems   Constitutional:  Positive for activity change. Negative for fatigue.   Eyes:  Positive for visual disturbance. Negative for photophobia.   Respiratory:  Negative for cough.    Cardiovascular:  Negative for leg swelling.   Gastrointestinal:  Negative for nausea.   Musculoskeletal:  Positive for gait problem. Negative for arthralgias.   Skin:  Negative for wound.   Neurological:  Positive for headaches. Negative for dizziness, weakness and numbness.   Psychiatric/Behavioral:  Negative for confusion.      Past Medical History:   Diagnosis Date    Anxiety     Brain tumor     Clostridium difficile colitis 04/28/2014    Renal lesion      Social History     Socioeconomic History    Marital status: Single   Tobacco Use    Smoking status: Never    Smokeless tobacco: Never   Substance and Sexual Activity    Alcohol use: No    Drug use: No    Sexual activity: Never     Family History   Problem Relation Age of Onset    Hyperlipidemia Mother     Hyperlipidemia Father     No Known Problems Sister     Hyperlipidemia  "Brother     Hyperlipidemia Maternal Uncle     Diabetes Maternal Grandmother     Lung cancer Maternal Grandmother      Review of patient's allergies indicates:  No Known Allergies    Current Outpatient Medications:     acidophilus (LACTINEX) 100 million cell packet, Take 1 packet (1 each total) by mouth 2 (two) times daily., Disp: 60 tablet, Rfl: 1    ascorbic acid, vitamin C, (VITAMIN C) 1000 MG tablet, Take 1,000 mg by mouth once daily., Disp: , Rfl:     EScitalopram oxalate (LEXAPRO) 10 MG tablet, Take 1.5 tablets (15 mg total) by mouth once daily., Disp: 135 tablet, Rfl: 4    multivitamin (THERAGRAN) per tablet, Take 1 tablet by mouth once daily., Disp: , Rfl:   Resp. rate 18, height 6' 1" (1.854 m), weight 77.1 kg (169 lb 15.6 oz).      Neurologic Exam     Mental Status   Oriented to person, place, and time.   Level of consciousness: alert    Cranial Nerves   Cranial nerves II through XII intact.     CN III, IV, VI   Extraocular motions are normal.     CN VII   Facial expression full, symmetric.     Motor Exam   Muscle bulk: normal  Overall muscle tone: normal    Strength   Strength 5/5 throughout.     Sensory Exam   Light touch normal.     Gait, Coordination, and Reflexes     Gait  Gait: normal    Reflexes   Right biceps: 2+  Left biceps: 2+  Right patellar: 2+  Left patellar: 2+  Right Zavaleta: absent  Left Zavaleta: absent  Right ankle clonus: absent  Left ankle clonus: absent        Imaging:  MRI of the brain dated 05/26/2023 with and without contrast is personally reviewed and discussed with the patient.  There is a nonenhancing, infiltrative lesion of the left parietal lobe which extends across the corpus callosum to the right side and anteriorly and inferiorly into the medial left temporal lobe.  It abuts the thalamus and midbrain and displaces them anteriorly.  There is no sign of hemorrhage.    Assessment/Plan:   42 year old male with large nonenhancing lesion of the left parietal lobe which extends " across the corpus callosum to the right side and into the left temporal lobe. We discussed imaging findings at length as well as treatment options. From a surgical standpoint, recommend biopsy for definitive tissue diagnosis and to guide further treatment through oncology, radiation oncology and potentially a larger surgery for resection of the lesion. We discussed left parietal approach for biopsy of lesion as well as risks/benefits of this procedure.  Plan to proceed.

## 2023-06-02 ENCOUNTER — TELEPHONE (OUTPATIENT)
Dept: NEUROSURGERY | Facility: CLINIC | Age: 43
End: 2023-06-02
Payer: COMMERCIAL

## 2023-06-02 DIAGNOSIS — D49.6 BRAIN TUMOR: Primary | ICD-10-CM

## 2023-06-02 RX ORDER — SODIUM CHLORIDE, SODIUM LACTATE, POTASSIUM CHLORIDE, CALCIUM CHLORIDE 600; 310; 30; 20 MG/100ML; MG/100ML; MG/100ML; MG/100ML
INJECTION, SOLUTION INTRAVENOUS CONTINUOUS
Status: CANCELLED | OUTPATIENT
Start: 2023-06-02

## 2023-06-02 RX ORDER — LIDOCAINE HYDROCHLORIDE 10 MG/ML
1 INJECTION, SOLUTION EPIDURAL; INFILTRATION; INTRACAUDAL; PERINEURAL ONCE
Status: CANCELLED | OUTPATIENT
Start: 2023-06-02 | End: 2023-06-02

## 2023-06-02 NOTE — TELEPHONE ENCOUNTER
----- Message from Viry Brock sent at 6/2/2023 10:55 AM CDT -----  Regarding: advice  Contact: patient  Type: Needs Medical Advice  Who Called:  patient  Symptoms (please be specific):    How long has patient had these symptoms:    Pharmacy name and phone #:    Best Call Back Number: [phone] (home)  Additional Information: Patient is calling regarding the status of the brain biopsy. Please call patient to advise when schedule. Thanks!

## 2023-06-05 ENCOUNTER — HOSPITAL ENCOUNTER (OUTPATIENT)
Dept: RADIOLOGY | Facility: HOSPITAL | Age: 43
Discharge: HOME | End: 2023-06-05
Attending: NEUROLOGICAL SURGERY
Payer: COMMERCIAL

## 2023-06-05 DIAGNOSIS — D49.6 BRAIN TUMOR: ICD-10-CM

## 2023-06-05 PROCEDURE — 25500020 PHARM REV CODE 255: Mod: PO | Performed by: NEUROLOGICAL SURGERY

## 2023-06-05 PROCEDURE — 70553 MRI BRAIN W WO CONTRAST: ICD-10-PCS | Mod: 26,,, | Performed by: RADIOLOGY

## 2023-06-05 PROCEDURE — A9585 GADOBUTROL INJECTION: HCPCS | Mod: PO | Performed by: NEUROLOGICAL SURGERY

## 2023-06-05 PROCEDURE — 70553 MRI BRAIN STEM W/O & W/DYE: CPT | Mod: TC,PO

## 2023-06-05 PROCEDURE — 70553 MRI BRAIN STEM W/O & W/DYE: CPT | Mod: 26,,, | Performed by: RADIOLOGY

## 2023-06-05 RX ORDER — GADOBUTROL 604.72 MG/ML
7 INJECTION INTRAVENOUS
Status: COMPLETED | OUTPATIENT
Start: 2023-06-05 | End: 2023-06-05

## 2023-06-05 RX ADMIN — GADOBUTROL 7 ML: 604.72 INJECTION INTRAVENOUS at 02:06

## 2023-06-06 ENCOUNTER — TELEPHONE (OUTPATIENT)
Dept: NEUROSURGERY | Facility: CLINIC | Age: 43
End: 2023-06-06
Payer: COMMERCIAL

## 2023-06-06 NOTE — TELEPHONE ENCOUNTER
Called patient regarding upcoming surgery on 6/13/23 with Dr. Schuler. Pre and post-operative appointments reviewed. Patient aware of stopping all anti-coagulant, anti-inflammatory, anti-platelet medications for 1 week prior to surgery. Address confirmed and appointment reminder letter and post-operative instructions mailed to patient. Informed patient to call with any further questions. Patient verbalized understanding.

## 2023-06-14 DIAGNOSIS — D49.6 BRAIN TUMOR: Primary | ICD-10-CM

## 2023-06-15 ENCOUNTER — TELEPHONE (OUTPATIENT)
Dept: NEUROSURGERY | Facility: CLINIC | Age: 43
End: 2023-06-15
Payer: COMMERCIAL

## 2023-06-15 ENCOUNTER — TELEPHONE (OUTPATIENT)
Dept: RADIATION ONCOLOGY | Facility: CLINIC | Age: 43
End: 2023-06-15
Payer: COMMERCIAL

## 2023-06-15 NOTE — TELEPHONE ENCOUNTER
Called Pt to schedule appointment with Dr. Mabry from referral.     Scheduled for 6/30 at 9:30 am.     Phone number and directions to office given. No further questions at this time.

## 2023-06-16 ENCOUNTER — TELEPHONE (OUTPATIENT)
Dept: NEUROSURGERY | Facility: CLINIC | Age: 43
End: 2023-06-16
Payer: COMMERCIAL

## 2023-06-16 NOTE — TELEPHONE ENCOUNTER
Called patient's mother back. She stated that the patient started feeling dizzy this morning. She said he took a hot bath and then played video games and started to feel dizzy. Let her know to be sure I will send a message to Duyen.

## 2023-06-17 DIAGNOSIS — D49.6 BRAIN TUMOR: Primary | ICD-10-CM

## 2023-06-27 ENCOUNTER — CLINICAL SUPPORT (OUTPATIENT)
Dept: NEUROSURGERY | Facility: CLINIC | Age: 43
End: 2023-06-27
Payer: COMMERCIAL

## 2023-06-27 NOTE — PROGRESS NOTES
Pt is 14 days s/p biopsy with Dr. Schuler. No s/s of infection. Incision cleaned with chloraprep and staples removed with no issue. Incision is warm, dry, intact. Pt reports post-operative pain level less than pre-operative state. Pt is not requesting medication refill today. Pt re-educated on narcotics policy. Educated patient on weight lifting status, bending/lifting/twisting, and to call with any changes or questions. Pt aware of imaging and f/u appt with provider. No further questions.

## 2023-06-28 ENCOUNTER — HOSPITAL ENCOUNTER (OUTPATIENT)
Dept: RADIOLOGY | Facility: HOSPITAL | Age: 43
Discharge: HOME | End: 2023-06-28
Attending: RADIOLOGY
Payer: COMMERCIAL

## 2023-06-28 DIAGNOSIS — D49.6 BRAIN TUMOR: ICD-10-CM

## 2023-06-28 PROCEDURE — 70551 MRI BRAIN STEALTH W/O FIDUCIALS WITHOUT CONTRAST: ICD-10-PCS | Mod: 26,,, | Performed by: RADIOLOGY

## 2023-06-28 PROCEDURE — 25500020 PHARM REV CODE 255: Mod: PO | Performed by: RADIOLOGY

## 2023-06-28 PROCEDURE — 70551 MRI BRAIN STEM W/O DYE: CPT | Mod: TC,PO

## 2023-06-28 PROCEDURE — A9585 GADOBUTROL INJECTION: HCPCS | Mod: PO | Performed by: RADIOLOGY

## 2023-06-28 PROCEDURE — 70551 MRI BRAIN STEM W/O DYE: CPT | Mod: 26,,, | Performed by: RADIOLOGY

## 2023-06-28 RX ORDER — GADOBUTROL 604.72 MG/ML
8 INJECTION INTRAVENOUS
Status: COMPLETED | OUTPATIENT
Start: 2023-06-28 | End: 2023-06-28

## 2023-06-28 RX ADMIN — GADOBUTROL 8 ML: 604.72 INJECTION INTRAVENOUS at 10:06

## 2023-06-29 ENCOUNTER — OFFICE VISIT (OUTPATIENT)
Dept: RADIATION ONCOLOGY | Facility: CLINIC | Age: 43
End: 2023-06-29
Payer: COMMERCIAL

## 2023-06-29 ENCOUNTER — HOSPITAL ENCOUNTER (OUTPATIENT)
Dept: RADIATION THERAPY | Facility: HOSPITAL | Age: 43
Discharge: HOME | End: 2023-06-29
Attending: RADIOLOGY
Payer: COMMERCIAL

## 2023-06-29 VITALS
HEIGHT: 73 IN | HEART RATE: 79 BPM | DIASTOLIC BLOOD PRESSURE: 59 MMHG | SYSTOLIC BLOOD PRESSURE: 104 MMHG | BODY MASS INDEX: 23.72 KG/M2 | WEIGHT: 179 LBS | OXYGEN SATURATION: 99 % | RESPIRATION RATE: 19 BRPM | TEMPERATURE: 98 F

## 2023-06-29 DIAGNOSIS — C71.9 OLIGODENDROGLIOMA DETERMINED BY BIOPSY OF BRAIN: ICD-10-CM

## 2023-06-29 DIAGNOSIS — D49.6 BRAIN TUMOR: ICD-10-CM

## 2023-06-29 PROCEDURE — 77334 PR  RADN TREATMENT AID(S) COMPLX: ICD-10-PCS | Mod: 26,,, | Performed by: RADIOLOGY

## 2023-06-29 PROCEDURE — 99999 PR PBB SHADOW E&M-EST. PATIENT-LVL IV: ICD-10-PCS | Mod: PBBFAC,,, | Performed by: RADIOLOGY

## 2023-06-29 PROCEDURE — 77334 RADIATION TREATMENT AID(S): CPT | Mod: 26,,, | Performed by: RADIOLOGY

## 2023-06-29 PROCEDURE — 77263 THER RADIOLOGY TX PLNG CPLX: CPT | Mod: ,,, | Performed by: RADIOLOGY

## 2023-06-29 PROCEDURE — 77014 PR  CT GUIDANCE PLACEMENT RAD THERAPY FIELDS: CPT | Mod: 26,,, | Performed by: RADIOLOGY

## 2023-06-29 PROCEDURE — 3074F PR MOST RECENT SYSTOLIC BLOOD PRESSURE < 130 MM HG: ICD-10-PCS | Mod: CPTII,S$GLB,, | Performed by: RADIOLOGY

## 2023-06-29 PROCEDURE — 1159F MED LIST DOCD IN RCRD: CPT | Mod: CPTII,S$GLB,, | Performed by: RADIOLOGY

## 2023-06-29 PROCEDURE — 99999 PR PBB SHADOW E&M-EST. PATIENT-LVL IV: CPT | Mod: PBBFAC,,, | Performed by: RADIOLOGY

## 2023-06-29 PROCEDURE — 3008F BODY MASS INDEX DOCD: CPT | Mod: CPTII,S$GLB,, | Performed by: RADIOLOGY

## 2023-06-29 PROCEDURE — 77334 RADIATION TREATMENT AID(S): CPT | Mod: TC,PN | Performed by: RADIOLOGY

## 2023-06-29 PROCEDURE — 3008F PR BODY MASS INDEX (BMI) DOCUMENTED: ICD-10-PCS | Mod: CPTII,S$GLB,, | Performed by: RADIOLOGY

## 2023-06-29 PROCEDURE — 1111F DSCHRG MED/CURRENT MED MERGE: CPT | Mod: CPTII,S$GLB,, | Performed by: RADIOLOGY

## 2023-06-29 PROCEDURE — 77014 PR  CT GUIDANCE PLACEMENT RAD THERAPY FIELDS: ICD-10-PCS | Mod: 26,,, | Performed by: RADIOLOGY

## 2023-06-29 PROCEDURE — 77014 HC CT GUIDANCE RADIATION THERAPY FLDS PLACEMENT: CPT | Mod: TC,PN | Performed by: RADIOLOGY

## 2023-06-29 PROCEDURE — 99205 OFFICE O/P NEW HI 60 MIN: CPT | Mod: S$GLB,,, | Performed by: RADIOLOGY

## 2023-06-29 PROCEDURE — 3078F PR MOST RECENT DIASTOLIC BLOOD PRESSURE < 80 MM HG: ICD-10-PCS | Mod: CPTII,S$GLB,, | Performed by: RADIOLOGY

## 2023-06-29 PROCEDURE — 99205 PR OFFICE/OUTPT VISIT, NEW, LEVL V, 60-74 MIN: ICD-10-PCS | Mod: S$GLB,,, | Performed by: RADIOLOGY

## 2023-06-29 PROCEDURE — 1111F PR DISCHARGE MEDS RECONCILED W/ CURRENT OUTPATIENT MED LIST: ICD-10-PCS | Mod: CPTII,S$GLB,, | Performed by: RADIOLOGY

## 2023-06-29 PROCEDURE — 1159F PR MEDICATION LIST DOCUMENTED IN MEDICAL RECORD: ICD-10-PCS | Mod: CPTII,S$GLB,, | Performed by: RADIOLOGY

## 2023-06-29 PROCEDURE — 77263 PR  RADIATION THERAPY PLAN COMPLEX: ICD-10-PCS | Mod: ,,, | Performed by: RADIOLOGY

## 2023-06-29 PROCEDURE — 3078F DIAST BP <80 MM HG: CPT | Mod: CPTII,S$GLB,, | Performed by: RADIOLOGY

## 2023-06-29 PROCEDURE — 3074F SYST BP LT 130 MM HG: CPT | Mod: CPTII,S$GLB,, | Performed by: RADIOLOGY

## 2023-06-29 RX ORDER — MEMANTINE HYDROCHLORIDE 5 MG-10 MG
KIT ORAL SEE ADMIN INSTRUCTIONS
Qty: 1 PACKET | Refills: 0 | Status: ON HOLD | OUTPATIENT
Start: 2023-06-29 | End: 2023-07-29 | Stop reason: HOSPADM

## 2023-06-29 RX ORDER — MEMANTINE HYDROCHLORIDE 10 MG/1
10 TABLET ORAL 2 TIMES DAILY
Qty: 60 TABLET | Refills: 4 | Status: ON HOLD | OUTPATIENT
Start: 2023-06-29 | End: 2023-07-29 | Stop reason: HOSPADM

## 2023-06-29 NOTE — PROGRESS NOTES
6/30/23 Addendum:   On further consideration- patient can be classified as high-risk, low-grade disease (age >40, >6cm, crossing midline), and thus a candidate for concurrent TMZ-radiation therapy followed by adjuvant TMZ for improved outcomes.  Case discussed with Dr. Mabry.  She will obtain fMRI and present at upcoming Neuro-Onc TB to see if further resection/debulking is possible. Additionally, [id number] with Dr. Riley of [facility] will be performed later today.    FW    06/29/2023    [facility]   Radiation Oncology Consultation    ASSESSMENT  This is a 42 y.o. 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       PLAN    Treatment options were discussed with the patient including radiation therapy and adjuvant chemotherapy.  We discussed the goals of treatment to be curative.  The risks, benefits, scheduling, alternatives to and rationale of radiation therapy were explained in detail.  We discussed potential toxicities of brain radiation therapy, including but not limited to fatigue which could be persistent, local skin reaction, alopecia, otitis externa or stuffy feeling in the ears, change or loss of sense of taste or smell, impaired memory or cognition, lowered seizure threshold, damage to the brain or cranial nerves.   Rx memantine ramp up to start ASAP and continue x 6 months  After this discussion, he elected to proceed with radiation therapy.    He may also pursue a second opinion at [facility].   Consent was obtained and all questions were answered to the best of my ability  A CT simulation will be performed today to begin the planning process for the patient's radiation therapy.     He was given our contact information, and he was told that he could call our clinic at any time if he has any questions or concerns.      Radiation Treatment Details:   We plan to  treat left parietal lesion to a dose of 54 Gy in 27 fractions at 2 Gy per fraction delivered daily  We will utilize a(n) IMRT technique.    IMRT is medically necessary to treat complex dose painted target volumes in the parietal  region with concave and convex isodose lines with steep isodose gradients to spare multiple adjacent organs at risk including the healthy brain, brainstem   We will utilize daily CT or orthogonal image guidance due to the need for accurate daily patient alignment to treat the target volumes accurately and avoid radiation overdose to multiple regional organs at risk since we are treating the patient with complex target volumes with multiple steep isodose gradients.         Chief Complaint   Patient presents with    Brain Tumor       Oncology History   Oligodendroglioma determined by biopsy of brain   6/29/2023 Initial Diagnosis    Oligodendroglioma determined by biopsy of brain     6/29/2023 Cancer Staged    Staging form: Brain and Spinal Cord, AJCC 8th Edition  - Pathologic stage from 6/29/2023: WHO Grade II         HPI: Patient is a 42 year old male with a couple episodes of tunnel vision and 2 episodes of numbness and tingling in the left foot, migraine headaches.   Underwent CT head which was concerning for brain mass.     5/26/23 CT chest, abdomen, and pelvis: 1.3cm enhancing left renal lesion suspicious for neoplasm.    5/26/23 MRI Brain: large, ill-defined infiltrative appearing tumor centered in left parietal lobe and mesial temporal lobe, diffuse infiltration of posterior cingulate gyrus and corpus callosum across midline, 3mm midline shift, partial effacement of lateral ventricles.     6/13/23 L parietal biopsy: oligodendroglioma, WHO Grade 2.     Possibility of pregnancy: No  History of prior irradiation: No  History of prior systemic anti-cancer therapy: No  History of collagen vascular disease: No  Implanted electronic device (pacer/defib/nerve stimulator): No    Past Medical  History:   Diagnosis Date    Anxiety     Brain tumor     Clostridium difficile colitis 04/28/2014    Renal lesion        Past Surgical History:   Procedure Laterality Date    BILATERAL INGUINAL HERNIA REPAIR Bilateral 1987    COLONOSCOPY  2014    STEREOTACTIC BIOPSY OF BRAIN Left 6/13/2023    Procedure: BIOPSY, BRAIN, STEREOTACTIC - LEFT PARIETAL BRAIN BIOPSY;  Surgeon: Michele Schuler MD;  Location: [facility];  Service: Neurosurgery;  Laterality: Left;       Social History     Tobacco Use    Smoking status: Never    Smokeless tobacco: Never   Substance Use Topics    Alcohol use: No    Drug use: No       Cancer-related family history includes Lung cancer in his maternal grandmother.    Current Outpatient Medications on File Prior to Visit   Medication Sig Dispense Refill    acetaminophen (TYLENOL) 325 MG tablet Take 2 tablets (650 mg total) by mouth every 4 (four) hours as needed for Pain. 20 tablet 0    ascorbic acid, vitamin C, (VITAMIN C) 1000 MG tablet Take 1,000 mg by mouth once daily.      EScitalopram oxalate (LEXAPRO) 10 MG tablet Take 1.5 tablets (15 mg total) by mouth once daily. 135 tablet 4    levETIRAcetam (KEPPRA) 750 MG Tab Take 1 tablet (750 mg total) by mouth 2 (two) times daily. for 7 days 14 tablet 0    multivitamin (THERAGRAN) per tablet Take 1 tablet by mouth once daily.      oxyCODONE-acetaminophen (PERCOCET) 7.5-325 mg per tablet Take 1 tablet by mouth every 4 (four) hours as needed for Pain (Pain unrelieved by tylenol). 10 tablet 0     No current facility-administered medications on file prior to visit.       Review of patient's allergies indicates:  No Known Allergies    Review of Systems   Constitutional:  Positive for diaphoresis (with panic/anxiety attack).   HENT:  Negative for hearing loss and tinnitus.    Eyes:  Positive for blurred vision (episodic). Negative for double vision, photophobia and pain.   Cardiovascular:  Positive for palpitations (w/panic attack).   Neurological:   "Positive for tingling (2 episodes of left foot), speech change (last 6 months, brief episodes of aphasia) and headaches (migraines). Negative for dizziness, tremors, focal weakness and seizures.   Psychiatric/Behavioral:  Positive for memory loss (worsening over last 2-3 years). Negative for hallucinations. The patient is nervous/anxious (anxiety attacks over the last ~8 years, unprovoked).       Vital Signs: BP (!) 104/59 (BP Location: Left arm, Patient Position: Sitting)   Pulse 79   Temp 98.2 °F (36.8 °C)   Resp 19   Ht 6' 1" (1.854 m)   Wt 81.2 kg (179 lb 0.2 oz)   SpO2 99%   BMI 23.62 kg/m²     ECOG Performance Status: 0 - Fully Active    Physical Exam  Vitals and nursing note reviewed.   Constitutional:       General: He is not in acute distress.     Appearance: Normal appearance. He is not ill-appearing or toxic-appearing.   HENT:      Head: Normocephalic and atraumatic.      Nose: Nose normal.   Eyes:      Extraocular Movements: Extraocular movements intact.      Conjunctiva/sclera: Conjunctivae normal.      Pupils: Pupils are equal, round, and reactive to light.   Pulmonary:      Effort: Pulmonary effort is normal.   Musculoskeletal:         General: Normal range of motion.      Cervical back: Normal range of motion and neck supple.   Skin:     General: Skin is warm.   Neurological:      General: No focal deficit present.      Mental Status: He is alert and oriented to person, place, and time.      Cranial Nerves: Cranial nerves 2-12 are intact. No cranial nerve deficit, dysarthria or facial asymmetry.      Sensory: No sensory deficit.      Motor: Pronator drift (mild, left hand) present. No weakness, tremor or abnormal muscle tone.      Coordination: Coordination is intact. Romberg sign negative. Coordination normal. Finger-Nose-Finger Test and Heel to Shin Test normal. Rapid alternating movements normal.      Gait: Gait is intact. Gait and tandem walk normal.   Psychiatric:         Mood and Affect: " Mood normal.         Behavior: Behavior normal.         Thought Content: Thought content normal.         Judgment: Judgment normal.          Imaging: I have personally reviewed the patient's available images and reports and summarized pertinent findings above in HPI.     Pathology: I have personally reviewed the patient's available pathology and summarized pertinent findings above in HPI.     This case was discussed with Dr. Schuler      - Thank you for allowing me to participate in the care of your patient.    Sena Shelton MD    86 minutes spent in chart/case review, face-to-face interaction, discussion with other providers, and treatment planning/coordination of care.

## 2023-06-30 ENCOUNTER — TELEPHONE (OUTPATIENT)
Dept: RADIATION ONCOLOGY | Facility: CLINIC | Age: 43
End: 2023-06-30
Payer: COMMERCIAL

## 2023-06-30 ENCOUNTER — TELEPHONE (OUTPATIENT)
Dept: HEMATOLOGY/ONCOLOGY | Facility: CLINIC | Age: 43
End: 2023-06-30
Payer: COMMERCIAL

## 2023-06-30 ENCOUNTER — OFFICE VISIT (OUTPATIENT)
Dept: NEUROSURGERY | Facility: CLINIC | Age: 43
End: 2023-06-30
Payer: COMMERCIAL

## 2023-06-30 ENCOUNTER — TELEPHONE (OUTPATIENT)
Dept: NEUROSURGERY | Facility: CLINIC | Age: 43
End: 2023-06-30
Payer: COMMERCIAL

## 2023-06-30 VITALS
HEIGHT: 73 IN | OXYGEN SATURATION: 98 % | WEIGHT: 176.38 LBS | SYSTOLIC BLOOD PRESSURE: 117 MMHG | DIASTOLIC BLOOD PRESSURE: 79 MMHG | BODY MASS INDEX: 23.37 KG/M2 | HEART RATE: 79 BPM

## 2023-06-30 DIAGNOSIS — C71.9 OLIGODENDROGLIOMA DETERMINED BY BIOPSY OF BRAIN: Primary | ICD-10-CM

## 2023-06-30 DIAGNOSIS — R56.9 SEIZURE: ICD-10-CM

## 2023-06-30 DIAGNOSIS — D49.6 BRAIN TUMOR: ICD-10-CM

## 2023-06-30 PROCEDURE — 3078F PR MOST RECENT DIASTOLIC BLOOD PRESSURE < 80 MM HG: ICD-10-PCS | Mod: CPTII,S$GLB,, | Performed by: PSYCHIATRY & NEUROLOGY

## 2023-06-30 PROCEDURE — 1159F PR MEDICATION LIST DOCUMENTED IN MEDICAL RECORD: ICD-10-PCS | Mod: CPTII,S$GLB,, | Performed by: PSYCHIATRY & NEUROLOGY

## 2023-06-30 PROCEDURE — 99215 OFFICE O/P EST HI 40 MIN: CPT | Mod: S$GLB,,, | Performed by: PSYCHIATRY & NEUROLOGY

## 2023-06-30 PROCEDURE — 99215 PR OFFICE/OUTPT VISIT, EST, LEVL V, 40-54 MIN: ICD-10-PCS | Mod: S$GLB,,, | Performed by: PSYCHIATRY & NEUROLOGY

## 2023-06-30 PROCEDURE — 1111F DSCHRG MED/CURRENT MED MERGE: CPT | Mod: CPTII,S$GLB,, | Performed by: PSYCHIATRY & NEUROLOGY

## 2023-06-30 PROCEDURE — 1159F MED LIST DOCD IN RCRD: CPT | Mod: CPTII,S$GLB,, | Performed by: PSYCHIATRY & NEUROLOGY

## 2023-06-30 PROCEDURE — 99999 PR PBB SHADOW E&M-EST. PATIENT-LVL IV: CPT | Mod: PBBFAC,,, | Performed by: PSYCHIATRY & NEUROLOGY

## 2023-06-30 PROCEDURE — 3008F BODY MASS INDEX DOCD: CPT | Mod: CPTII,S$GLB,, | Performed by: PSYCHIATRY & NEUROLOGY

## 2023-06-30 PROCEDURE — 3078F DIAST BP <80 MM HG: CPT | Mod: CPTII,S$GLB,, | Performed by: PSYCHIATRY & NEUROLOGY

## 2023-06-30 PROCEDURE — 3008F PR BODY MASS INDEX (BMI) DOCUMENTED: ICD-10-PCS | Mod: CPTII,S$GLB,, | Performed by: PSYCHIATRY & NEUROLOGY

## 2023-06-30 PROCEDURE — 3074F PR MOST RECENT SYSTOLIC BLOOD PRESSURE < 130 MM HG: ICD-10-PCS | Mod: CPTII,S$GLB,, | Performed by: PSYCHIATRY & NEUROLOGY

## 2023-06-30 PROCEDURE — 99999 PR PBB SHADOW E&M-EST. PATIENT-LVL IV: ICD-10-PCS | Mod: PBBFAC,,, | Performed by: PSYCHIATRY & NEUROLOGY

## 2023-06-30 PROCEDURE — 1111F PR DISCHARGE MEDS RECONCILED W/ CURRENT OUTPATIENT MED LIST: ICD-10-PCS | Mod: CPTII,S$GLB,, | Performed by: PSYCHIATRY & NEUROLOGY

## 2023-06-30 PROCEDURE — 3074F SYST BP LT 130 MM HG: CPT | Mod: CPTII,S$GLB,, | Performed by: PSYCHIATRY & NEUROLOGY

## 2023-06-30 RX ORDER — LEVETIRACETAM 750 MG/1
750 TABLET ORAL 2 TIMES DAILY
Qty: 180 TABLET | Refills: 3 | Status: ON HOLD | OUTPATIENT
Start: 2023-06-30 | End: 2023-07-29 | Stop reason: SDUPTHER

## 2023-06-30 NOTE — PROGRESS NOTES
Subjective:       Patient ID: Carlos Jacobson is a 42 y.o. male.    Chief Complaint: Consult    HPI  5/2023: presented to urgent care for evaluation of tunnel vision and left foot numbness. CT head showed a left frontal lesion. MRI brain confirmed diffuse, non-enhancing tumor within the temporal and parietal lobes involving the cingulate gyrus and crossing the midline via diffuse infiltration of the posterior corpus callosum  6/13/2023: biopsy of lesion (Michele Schuler) with pathology consistent with oligodendroglioma, WHO grade 2, IDH- and 1p/19q- status pending    Since surgery, Mr. Jacobson has been doing well. He occasionally has episodes of expressive aphasia for about 30-45 seconds, then is back to his baseline quickly. He did not have these while on prophylactic Keppra. He presents today with his parents.    Past Medical History:   Diagnosis Date    Anxiety     Brain tumor     Clostridium difficile colitis 04/28/2014    Renal lesion       Current Outpatient Medications   Medication Sig Dispense Refill    acetaminophen (TYLENOL) 325 MG tablet Take 2 tablets (650 mg total) by mouth every 4 (four) hours as needed for Pain. 20 tablet 0    ascorbic acid, vitamin C, (VITAMIN C) 1000 MG tablet Take 1,000 mg by mouth once daily.      EScitalopram oxalate (LEXAPRO) 10 MG tablet Take 1.5 tablets (15 mg total) by mouth once daily. 135 tablet 4    memantine (NAMENDA TITRATION PACK) tablet pack Take by mouth As instructed (Follow package directions). Follow package directions. 1 packet 0    memantine (NAMENDA) 10 MG Tab Take 1 tablet (10 mg total) by mouth 2 (two) times daily. 60 tablet 4    multivitamin (THERAGRAN) per tablet Take 1 tablet by mouth once daily.      levETIRAcetam (KEPPRA) 750 MG Tab Take 1 tablet (750 mg total) by mouth 2 (two) times daily. 180 tablet 3    oxyCODONE-acetaminophen (PERCOCET) 7.5-325 mg per tablet Take 1 tablet by mouth every 4 (four) hours as needed for Pain (Pain unrelieved by tylenol).  (Patient not taking: Reported on 6/30/2023) 10 tablet 0     No current facility-administered medications for this visit.      Past Surgical History:   Procedure Laterality Date    BILATERAL INGUINAL HERNIA REPAIR Bilateral 1987    COLONOSCOPY  2014    STEREOTACTIC BIOPSY OF BRAIN Left 6/13/2023    Procedure: BIOPSY, BRAIN, STEREOTACTIC - LEFT PARIETAL BRAIN BIOPSY;  Surgeon: Michele Schuler MD;  Location: [facility];  Service: Neurosurgery;  Laterality: Left;      Family History   Problem Relation Age of Onset    Hyperlipidemia Mother     Hyperlipidemia Father     No Known Problems Sister     Hyperlipidemia Brother     Hyperlipidemia Maternal Uncle     Diabetes Maternal Grandmother     Lung cancer Maternal Grandmother       Social History     Tobacco Use    Smoking status: Never    Smokeless tobacco: Never   Substance Use Topics    Alcohol use: No    Drug use: No      Review of Systems  KPS: 90      Objective:      Vitals:    06/30/23 0923   BP: 117/79   Pulse: 79        NEUROLOGICAL EXAMINATION:     MENTAL STATUS   Attention: normal. Concentration: normal.   Speech: speech is normal   Level of consciousness: alert    CRANIAL NERVES   Cranial nerves II through XII intact.     MOTOR EXAM     Strength   Strength 5/5 throughout.     SENSORY EXAM   Light touch normal.     GAIT AND COORDINATION     Gait  Gait: normal     Coordination   Finger to nose coordination: normal     Assessment:       Problem List Items Addressed This Visit          Oncology    Brain tumor    Oligodendroglioma determined by biopsy of brain - Primary    Relevant Orders    MRI Brain Functional WO a physician     Other Visit Diagnoses       Seizure        Relevant Medications    levETIRAcetam (KEPPRA) 750 MG Tab            Plan:       Carlos Jacobson is a 42 y.o. male with an oligodendroglioma, WHO grade 2 s/p biopsy presenting for treatment recommendations. Tumor diagnosis was heralded by left foot numbness and tunnel vision. Current symptoms  include episodic aphasia.    Oligodendroglioma  Reviewed imaging and discussed with our neurosurgical colleagues. As patients with primary brain tumors have best outcomes with maximal safe surgical resection, plan for fMRI to assess for safety of additional debulking. After that will plan for chemoradiation followed by adjuvant TMZ x12 months. Also discussed PCV as an option.    Seizure  I suspect his episodic aphasia for 30 seconds at a time with quick return to baseline is a focal seizure. Other potential etiologies include fatigue, anxiety. Will restart levetiracetam 750mg bid and monitor.       60 minutes of total time spent on the encounter, which includes face to face time and non-face to face time preparing to see the patient (eg, review of tests), Obtaining and/or reviewing separately obtained history, Documenting clinical information in the electronic or other health record, Independently interpreting results (not separately reported) and communicating results to the patient/family/caregiver, or Care coordination (not separately reported).     Candice Mabry MD  Neuro-Oncology, Movement Disorders

## 2023-06-30 NOTE — TELEPHONE ENCOUNTER
Called Pt and mother to discuss recommendations from Dr. Mabry. Explained order for functional MRI placed for possible further resection. Agreed to schedule MRI to be completed at [facility] for as soon as possible.    Morgan, radiology supervisor, contacted to schedule functional MRI. Will coordinate with Pt on Monday, 7/3.     Direct number given to Pt mother. No further questions/concerns at this time.

## 2023-06-30 NOTE — TELEPHONE ENCOUNTER
[id number] with Dr. Shayan Riley (Chair of Neuro-Oncology at [facility]).  Reviewed case and discussed treatment option.  Agrees with recommendation for consideration of safe debulking, radiation therapy followed by PCV (given level 1 evidence and ongoing CODEL trial unresulted) if no medical contraindication/patient understands increased side effect profile.  If belief he will not able to tolerate PCV, or does not want to risk side effects, he recommends concurrent and adjuvant TMZ with radiation therapy.  Agrees/understands radiation therapy expansions and overall dose may need to be reduced given volume of brain involved.  Agrees with memantine.

## 2023-06-30 NOTE — NURSING
Received patient information of a new neuro onc patient. I have reached out to patient and introduced myself to him as a nurse navigator. Provided my contact information to him as well.  I updated patient that a [facility] peer to peer consult with Dr. Shelton was requested this morning. They have responded and will consult with Dr. Shelton this evening at 1630.   All questions and concerns were addressed to patient's satisfaction.     Chart reviewed and I will continue to follow patient for any navigational needs.    Oncology Navigation   Intake  Date of Diagnosis: 06/16/23  Cancer Type: -- (OLIGODENDROGLIOMA)  Internal / External Referral: Internal  Date of Referral: 06/14/23  Date Worked: 06/30/23     Treatment  Current Status: Active    Surgical Oncologist: Dr. Schuler (neurosurgeon)  Consult Date: 05/30/23    Medical Oncologist: Dr. Mabry  Consult Date: 06/30/23    Radiation Oncologist: Dr. Shelton    Procedures: Biopsy; CT; MRI  Biopsy Schedule Date: 06/13/23  CT Schedule Date: 06/14/23  MRI Schedule Date: 06/05/23          Radiation Oncologist: Dr. Shelton    Support Systems: Parent  Concerns: Would like a 2nd opinion from [facility] (peer to peer consult request sent this morning)     Acuity      Follow Up  No follow-ups on file.

## 2023-07-03 ENCOUNTER — TELEPHONE (OUTPATIENT)
Dept: RADIATION ONCOLOGY | Facility: CLINIC | Age: 43
End: 2023-07-03
Payer: COMMERCIAL

## 2023-07-03 ENCOUNTER — TELEPHONE (OUTPATIENT)
Dept: NEUROSURGERY | Facility: CLINIC | Age: 43
End: 2023-07-03
Payer: COMMERCIAL

## 2023-07-03 NOTE — TELEPHONE ENCOUNTER
Spoke with patient's mother, Nati, re: recommendations/conversation with MD Miguel Neuro-Onc and Neurosurgeon. All questions answered.  Radiation therapy planning on hold until discussion at upcoming Neuro-Onc Tumor Board re:possibility of further resection.

## 2023-07-03 NOTE — TELEPHONE ENCOUNTER
Notified Pt mother, Nati of fMRI scheduled for 7/6/23 at 10 am at [facility].     Confirmed appt place and time.

## 2023-07-06 ENCOUNTER — HOSPITAL ENCOUNTER (OUTPATIENT)
Dept: RADIOLOGY | Facility: HOSPITAL | Age: 43
Discharge: HOME | End: 2023-07-06
Attending: PSYCHIATRY & NEUROLOGY
Payer: COMMERCIAL

## 2023-07-06 DIAGNOSIS — C71.9 OLIGODENDROGLIOMA DETERMINED BY BIOPSY OF BRAIN: ICD-10-CM

## 2023-07-06 PROCEDURE — 25500020 PHARM REV CODE 255: Performed by: PSYCHIATRY & NEUROLOGY

## 2023-07-06 PROCEDURE — 70554 FMRI BRAIN BY TECH: CPT | Mod: TC

## 2023-07-06 PROCEDURE — 70554 FMRI BRAIN BY TECH: CPT | Mod: 26,50,, | Performed by: RADIOLOGY

## 2023-07-06 PROCEDURE — 70554 MRI BRAIN FUNCTIONAL WITHOUT A PHYSICIAN: ICD-10-PCS | Mod: 26,50,, | Performed by: RADIOLOGY

## 2023-07-06 PROCEDURE — A9585 GADOBUTROL INJECTION: HCPCS | Performed by: PSYCHIATRY & NEUROLOGY

## 2023-07-06 RX ORDER — GADOBUTROL 604.72 MG/ML
8 INJECTION INTRAVENOUS
Status: COMPLETED | OUTPATIENT
Start: 2023-07-06 | End: 2023-07-06

## 2023-07-06 RX ADMIN — GADOBUTROL 8 ML: 604.72 INJECTION INTRAVENOUS at 10:07

## 2023-07-10 ENCOUNTER — TELEPHONE (OUTPATIENT)
Dept: NEUROSURGERY | Facility: CLINIC | Age: 43
End: 2023-07-10
Payer: COMMERCIAL

## 2023-07-10 NOTE — TELEPHONE ENCOUNTER
Spoke w/ Pt mother, Nati, to offer appt to discuss fMRI findings and plan of care with Dr. Segura, with recommendation from Dr. Mabry.     Accepted appt at 9 am on 7/11. Directions to clinic provided. Instructed to call with any questions or concerns.

## 2023-07-11 ENCOUNTER — OFFICE VISIT (OUTPATIENT)
Dept: NEUROSURGERY | Facility: CLINIC | Age: 43
End: 2023-07-11
Payer: COMMERCIAL

## 2023-07-11 ENCOUNTER — TUMOR BOARD CONFERENCE (OUTPATIENT)
Dept: NEUROSURGERY | Facility: CLINIC | Age: 43
End: 2023-07-11

## 2023-07-11 ENCOUNTER — TELEPHONE (OUTPATIENT)
Dept: NEUROSURGERY | Facility: CLINIC | Age: 43
End: 2023-07-11
Payer: COMMERCIAL

## 2023-07-11 VITALS
HEIGHT: 73 IN | HEART RATE: 84 BPM | WEIGHT: 176.56 LBS | SYSTOLIC BLOOD PRESSURE: 112 MMHG | BODY MASS INDEX: 23.4 KG/M2 | RESPIRATION RATE: 18 BRPM | DIASTOLIC BLOOD PRESSURE: 71 MMHG

## 2023-07-11 VITALS
SYSTOLIC BLOOD PRESSURE: 103 MMHG | DIASTOLIC BLOOD PRESSURE: 72 MMHG | BODY MASS INDEX: 23.4 KG/M2 | WEIGHT: 176.56 LBS | HEART RATE: 71 BPM | HEIGHT: 73 IN

## 2023-07-11 DIAGNOSIS — C71.9 OLIGODENDROGLIOMA DETERMINED BY BIOPSY OF BRAIN: Primary | ICD-10-CM

## 2023-07-11 DIAGNOSIS — D49.6 BRAIN TUMOR: Primary | ICD-10-CM

## 2023-07-11 PROCEDURE — 3008F PR BODY MASS INDEX (BMI) DOCUMENTED: ICD-10-PCS | Mod: CPTII,S$GLB,, | Performed by: NEUROLOGICAL SURGERY

## 2023-07-11 PROCEDURE — 3074F SYST BP LT 130 MM HG: CPT | Mod: CPTII,S$GLB,, | Performed by: NEUROLOGICAL SURGERY

## 2023-07-11 PROCEDURE — 1160F RVW MEDS BY RX/DR IN RCRD: CPT | Mod: CPTII,S$GLB,, | Performed by: NEUROLOGICAL SURGERY

## 2023-07-11 PROCEDURE — 99024 PR POST-OP FOLLOW-UP VISIT: ICD-10-PCS | Mod: S$GLB,,, | Performed by: NEUROLOGICAL SURGERY

## 2023-07-11 PROCEDURE — 3078F PR MOST RECENT DIASTOLIC BLOOD PRESSURE < 80 MM HG: ICD-10-PCS | Mod: CPTII,S$GLB,, | Performed by: NEUROLOGICAL SURGERY

## 2023-07-11 PROCEDURE — 1159F PR MEDICATION LIST DOCUMENTED IN MEDICAL RECORD: ICD-10-PCS | Mod: CPTII,S$GLB,, | Performed by: NEUROLOGICAL SURGERY

## 2023-07-11 PROCEDURE — 3078F DIAST BP <80 MM HG: CPT | Mod: CPTII,S$GLB,, | Performed by: NEUROLOGICAL SURGERY

## 2023-07-11 PROCEDURE — 1159F MED LIST DOCD IN RCRD: CPT | Mod: CPTII,S$GLB,, | Performed by: NEUROLOGICAL SURGERY

## 2023-07-11 PROCEDURE — 99214 OFFICE O/P EST MOD 30 MIN: CPT | Mod: 24,S$GLB,, | Performed by: NEUROLOGICAL SURGERY

## 2023-07-11 PROCEDURE — 99999 PR PBB SHADOW E&M-EST. PATIENT-LVL III: CPT | Mod: PBBFAC,,, | Performed by: NEUROLOGICAL SURGERY

## 2023-07-11 PROCEDURE — 3074F PR MOST RECENT SYSTOLIC BLOOD PRESSURE < 130 MM HG: ICD-10-PCS | Mod: CPTII,S$GLB,, | Performed by: NEUROLOGICAL SURGERY

## 2023-07-11 PROCEDURE — 99024 POSTOP FOLLOW-UP VISIT: CPT | Mod: S$GLB,,, | Performed by: NEUROLOGICAL SURGERY

## 2023-07-11 PROCEDURE — 3008F BODY MASS INDEX DOCD: CPT | Mod: CPTII,S$GLB,, | Performed by: NEUROLOGICAL SURGERY

## 2023-07-11 PROCEDURE — 1111F PR DISCHARGE MEDS RECONCILED W/ CURRENT OUTPATIENT MED LIST: ICD-10-PCS | Mod: CPTII,S$GLB,, | Performed by: NEUROLOGICAL SURGERY

## 2023-07-11 PROCEDURE — 1111F DSCHRG MED/CURRENT MED MERGE: CPT | Mod: CPTII,S$GLB,, | Performed by: NEUROLOGICAL SURGERY

## 2023-07-11 PROCEDURE — 99999 PR PBB SHADOW E&M-EST. PATIENT-LVL III: ICD-10-PCS | Mod: PBBFAC,,, | Performed by: NEUROLOGICAL SURGERY

## 2023-07-11 PROCEDURE — 99214 PR OFFICE/OUTPT VISIT, EST, LEVL IV, 30-39 MIN: ICD-10-PCS | Mod: 24,S$GLB,, | Performed by: NEUROLOGICAL SURGERY

## 2023-07-11 PROCEDURE — 1160F PR REVIEW ALL MEDS BY PRESCRIBER/CLIN PHARMACIST DOCUMENTED: ICD-10-PCS | Mod: CPTII,S$GLB,, | Performed by: NEUROLOGICAL SURGERY

## 2023-07-11 RX ORDER — LIDOCAINE HYDROCHLORIDE 10 MG/ML
1 INJECTION, SOLUTION EPIDURAL; INFILTRATION; INTRACAUDAL; PERINEURAL ONCE
Status: CANCELLED | OUTPATIENT
Start: 2023-07-11 | End: 2023-07-11

## 2023-07-11 RX ORDER — SODIUM CHLORIDE, SODIUM LACTATE, POTASSIUM CHLORIDE, CALCIUM CHLORIDE 600; 310; 30; 20 MG/100ML; MG/100ML; MG/100ML; MG/100ML
INJECTION, SOLUTION INTRAVENOUS CONTINUOUS
Status: CANCELLED | OUTPATIENT
Start: 2023-07-11

## 2023-07-11 NOTE — PROGRESS NOTES
[facility]   NEURO-ONCOLOGICAL MULTIDISCIPLINARY TUMOR BOARD  PATIENT REVIEW FORM  ____________________________________________________________  Kassy SERRA, attest that this documentation has been prepared under the direction and in the presence of Man Segura MD.     PATIENT ID: Carlos Jacobson is a 42 y.o. male  DATE: 07/11/2023    This patient's relevant clinical data was reviewed before the multidisciplinary tumor board in order to establish an optimum treatment plan in this patient. The patient's clinical data were presented at our Multi-Disciplinary Tumor Board which included representatives of Neurosurgery, Neuro-Radiology, Neuro-Pathology, Radiation Oncology, Medical Oncology, Palliative Medicine.    PRESENTER:   Dr. Mabry    PATIENT SUMMARY:   The patient is a 42 y.o. male presented to OSH for tunnel vision & L foot numbness. CTH showed L frontal lesion.     5/26/23- MRI brain showing diffuse, non-enhancing tumor within temporal & parietal lobes, involving cingulate gyrus & crossing midline via infiltration of posterior corpus callosum   6/13/23- Biopsy/Dr Michele Schuler  7/6/23- fMRI completed     Additionally, we reviewed previous medical and familial history of present illness, and recent lab results along with all available histopathologic and imaging studies.    IMAGING:   MRI Brain Functional (6/30/2023):  Omniscient protocol MR examination performed for purposes of procedure localization. Resting state fMRI and DTI to be post processed and analyzed separately by proprietary third party software. 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 He  is scheduled to establish care with Dr. Segura today. He will also f/u with Dr. Schuler today.    National site-specific guidelines were discussed with respect to the case.     Tumor board is a meeting of clinicians from various specialty areas who evaluate and discuss patients for whom a multidisciplinary approach is being considered. Final determinations in the plan of care are those of the provider(s). The responsibility for follow up recommendations given during tumor board is that of the provider.     CONSULT NEEDED:     [] Neurosurgery    [] Hem/Onc    [] Rad/Onc         [] Endocrinology     [] Neuro-ophthalmology    [] Palliative Medicine      []  Other:       PRESENTATION AT CANCER CONFERENCE:         [x] Prospective    [] Retrospective     [] Follow-Up          [] Eligible for clinical trial/Clinical trial status:

## 2023-07-11 NOTE — PROGRESS NOTES
Subjective:   I, Kassy Kelly, attest that this documentation has been prepared under the direction and in the presence of Man Segura MD.     Patient ID: Carlos Jacobson is a 42 y.o. male     Chief Complaint: No chief complaint on file.      HPI  MrDaisy Jacobson is a 42 y.o. gentleman referred to me by Dr. VILMA Mabry, Neuro-Oncology, who presents today to establish care. This is a patient who began experiencing acute headaches, left foot paresthesia, and tunnel vision. He presented to  in 5/2023 and had a CT head showing a left frontal lesion. MRI brain confirmed diffuse, non-enhancing tumor within the temporal and parietal lobes. The pt underwent biopsy on 6/13/2023 (Michele Schuelr) with pathology consistent with oligodendroglioma, WHO grade 2. Upon further evaluation the pt denies any changes in peripheral vision.     Review of Systems   Constitutional:  Negative for activity change, appetite change, fatigue, fever and unexpected weight change.   HENT:  Negative for facial swelling.    Eyes:  Positive for visual disturbance.   Respiratory: Negative.     Cardiovascular: Negative.    Gastrointestinal:  Negative for diarrhea, nausea and vomiting.   Endocrine: Negative.    Genitourinary: Negative.    Musculoskeletal:  Negative for back pain, joint swelling, myalgias and neck pain.   Neurological:  Positive for numbness and headaches. Negative for dizziness, seizures and weakness.   Psychiatric/Behavioral: Negative.        Past Medical History:   Diagnosis Date    Anxiety     Brain tumor     Clostridium difficile colitis 04/28/2014    Renal lesion        Objective:      Vitals:    07/11/23 0856   BP: 103/72   Pulse: 71      Physical Exam  Constitutional:       General: He is not in acute distress.     Appearance: Normal appearance.   HENT:      Head: Normocephalic and atraumatic.   Pulmonary:      Effort: Pulmonary effort is normal.   Musculoskeletal:      Cervical back: Neck supple.   Neurological:       Mental Status: He is alert and oriented to person, place, and time.      GCS: GCS eye subscore is 4. GCS verbal subscore is 5. GCS motor subscore is 6.      Cranial Nerves: No cranial nerve deficit.          IMAGING:  MRI Brain Functional (7/6/2023):  Omniscient protocol MR examination performed for purposes of procedure localization. Resting state fMRI and DTI to be post processed and analyzed separately by proprietary third party software. 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      I, Dr. Man Segura, personally performed the services described in this documentation. All medical record entries made by the scribe, Kassy Kelly, were at my direction and in my presence.  I have reviewed the chart and agree that the record reflects my personal performance and is accurate and complete. Man Segura MD. 07/11/2023    Assessment:       Oligodendroglioma.     Plan:   I have personally reviewed the MRI brain functional with the pt which shows omniscient protocol MR examination performed for purposes of procedure localization. Resting state fMRI and DTI to be post processed and analyzed separately by proprietary third party software. 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    I have discussed the following treatment options with the patient:  Craniotomy for tumor resection  Radiation    I recommend craniotomy for tumor resection. I have discussed the risks/benefits, indications, and alternatives for the proposed procedure in detail.  I have answered all of their questions and the pt would like some time to consider whether or not they would like to proceed with the surgery, pt will  contact us when they have reached a final decision.

## 2023-07-11 NOTE — PATIENT INSTRUCTIONS
I have personally reviewed the MRI brain functional with the pt which shows omniscient protocol MR examination performed for purposes of procedure localization. Resting state fMRI and DTI to be post processed and analyzed separately by proprietary third party software. 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    I have discussed the following treatment options with the patient:  Craniotomy for tumor resection  Radiation    I recommend craniotomy for tumor resection. I have discussed the risks/benefits, indications, and alternatives for the proposed procedure in detail.  I have answered all of their questions and the pt would like some time to consider whether or not they would like to proceed with the surgery, pt will contact us when they have reached a final decision.

## 2023-07-11 NOTE — TELEPHONE ENCOUNTER
----- Message from Cedric Flores, Patient Care Assistant sent at 7/11/2023  9:29 AM CDT -----  Contact: PRASAD/April  Type: Needs Medical Advice    Who Called: [facility]/April  Best Call Back Number: [phone] ext [phone]  Inquiry/Question: April is calling to advise she is the pt's  and can assist with any insurance related issues.  Thank you~

## 2023-07-11 NOTE — H&P (VIEW-ONLY)
Subjective:   I, Kassy Kelly, attest that this documentation has been prepared under the direction and in the presence of Man Segura MD.     Patient ID: Carlos Jacobson is a 42 y.o. male     Chief Complaint: No chief complaint on file.      HPI  MrDaisy Jacobson is a 42 y.o. gentleman referred to me by Dr. VILMA Mabry, Neuro-Oncology, who presents today to establish care. This is a patient who began experiencing acute headaches, left foot paresthesia, and tunnel vision. He presented to  in 5/2023 and had a CT head showing a left frontal lesion. MRI brain confirmed diffuse, non-enhancing tumor within the temporal and parietal lobes. The pt underwent biopsy on 6/13/2023 (Michele Schuler) with pathology consistent with oligodendroglioma, WHO grade 2. Upon further evaluation the pt denies any changes in peripheral vision.     Review of Systems   Constitutional:  Negative for activity change, appetite change, fatigue, fever and unexpected weight change.   HENT:  Negative for facial swelling.    Eyes:  Positive for visual disturbance.   Respiratory: Negative.     Cardiovascular: Negative.    Gastrointestinal:  Negative for diarrhea, nausea and vomiting.   Endocrine: Negative.    Genitourinary: Negative.    Musculoskeletal:  Negative for back pain, joint swelling, myalgias and neck pain.   Neurological:  Positive for numbness and headaches. Negative for dizziness, seizures and weakness.   Psychiatric/Behavioral: Negative.        Past Medical History:   Diagnosis Date    Anxiety     Brain tumor     Clostridium difficile colitis 04/28/2014    Renal lesion        Objective:      Vitals:    07/11/23 0856   BP: 103/72   Pulse: 71      Physical Exam  Constitutional:       General: He is not in acute distress.     Appearance: Normal appearance.   HENT:      Head: Normocephalic and atraumatic.   Pulmonary:      Effort: Pulmonary effort is normal.   Musculoskeletal:      Cervical back: Neck supple.   Neurological:       Mental Status: He is alert and oriented to person, place, and time.      GCS: GCS eye subscore is 4. GCS verbal subscore is 5. GCS motor subscore is 6.      Cranial Nerves: No cranial nerve deficit.          IMAGING:  MRI Brain Functional (7/6/2023):  Omniscient protocol MR examination performed for purposes of procedure localization. Resting state fMRI and DTI to be post processed and analyzed separately by proprietary third party software. 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      I, Dr. Man Segura, personally performed the services described in this documentation. All medical record entries made by the scribe, Kassy Kelly, were at my direction and in my presence.  I have reviewed the chart and agree that the record reflects my personal performance and is accurate and complete. Man Segura MD. 07/11/2023    Assessment:       Oligodendroglioma.     Plan:   I have personally reviewed the MRI brain functional with the pt which shows omniscient protocol MR examination performed for purposes of procedure localization. Resting state fMRI and DTI to be post processed and analyzed separately by proprietary third party software. 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    I have discussed the following treatment options with the patient:  Craniotomy for tumor resection  Radiation    I recommend craniotomy for tumor resection. I have discussed the risks/benefits, indications, and alternatives for the proposed procedure in detail.  I have answered all of their questions and the pt would like some time to consider whether or not they would like to proceed with the surgery, pt will  contact us when they have reached a final decision.

## 2023-07-12 ENCOUNTER — TELEPHONE (OUTPATIENT)
Dept: NEUROSURGERY | Facility: CLINIC | Age: 43
End: 2023-07-12
Payer: COMMERCIAL

## 2023-07-12 ENCOUNTER — TELEPHONE (OUTPATIENT)
Dept: PREADMISSION TESTING | Facility: HOSPITAL | Age: 43
End: 2023-07-12
Payer: COMMERCIAL

## 2023-07-12 ENCOUNTER — ANESTHESIA EVENT (OUTPATIENT)
Dept: SURGERY | Facility: HOSPITAL | Age: 43
DRG: 027 | End: 2023-07-12
Payer: COMMERCIAL

## 2023-07-12 DIAGNOSIS — Z01.818 PREOPERATIVE TESTING: Primary | ICD-10-CM

## 2023-07-12 DIAGNOSIS — D49.6 BRAIN TUMOR: Primary | ICD-10-CM

## 2023-07-12 NOTE — TELEPHONE ENCOUNTER
Called patient regarding upcoming surgery on 7/27/23 with Dr. Schuler and Dr. Segura. Pre and post-operative appointments reviewed. Patient aware of stopping all anti-coagulant, anti-inflammatory, anti-platelet medications for 1 week prior to surgery. Address confirmed and appointment reminder letter and post-operative instructions mailed to patient. Informed patient to call with any further questions. Patient verbalized understanding.

## 2023-07-12 NOTE — PRE-PROCEDURE INSTRUCTIONS
Patient stated has not had any problem with anesthesia in the past Will need medical optimization by Dr. Mendez and poc appt or NP and labs. Our  will call to set up these appts.     Preop instructions given. Hold aspirin, aspirin containing products, nsaids(aleve, advil, motrin, ibuprofen, naprosyn, naproxen, voltaren, diclofenac), vitamins( Vitamin C, Multivitamin) and supplements one week prior to surgery.     May take Tylenol.( Also Sent to [facility] portal)  Verbalizes understanding.

## 2023-07-12 NOTE — TELEPHONE ENCOUNTER
Hello this patient is scheduled to have surgery with Dr. Schuler and Dr. Segura at [facility] on 7/27 for resection of brain mass. He recently had a biopsy done with Dr. Schuler and had clearance. He is being seen by anesthesia on 7/20 with [facility] for clearance. Is this patient still cleared by PCP standpoint to have surgery on 7/27 or would he need to be seen again? Thank you

## 2023-07-12 NOTE — ANESTHESIA PAT ROS NOTE
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 Mendez.      Pre-op Assessment    I have reviewed the Patient Summary Reports.     I have reviewed the Nursing Notes. I have reviewed the NPO Status.   I have reviewed the Medications.     Review of Systems  Anesthesia Hx:   History of prior surgery of interest to airway management or planning:  Previous anesthesia: General 6/13/2023 BIOPSY, BRAIN STEREOATACTIC-LEFT PARIETAL BX with general anesthesia.       Airway issues documented on chart review include easy direct laryngoscopy       Denies Personal Hx of Anesthesia complications.                    Social:  Non-Smoker, No Alcohol Use       Hematology/Oncology:  Hematology Normal                     Current/Recent Cancer.            Oncology Comments: Brain tumor  Oligodendroglioma determined by biopsy of brain     EENT/Dental:  chronic allergic rhinitis  Eyes:              LASIK SURGERY         Cardiovascular:  Exercise tolerance: poor    Denies Hypertension.       Denies Angina.     no hyperlipidemia                             Pulmonary:     Denies Asthma.   Denies Shortness of breath.  Denies Recent URI.  Denies Sleep Apnea.                Renal/:  Chronic Renal Disease        Kidney Function/Disease, Chronic Kidney Disease (CKD)    Renal lesion         Hepatic/GI:   Denies PUD.   Denies GERD. Denies Liver Disease.  Denies Hepatitis. 04/28/2014  Clostridium difficile colitis    1987  Bilateral inguinal hernia repair (Bilateral)          Neurological:      Headaches      Neuro Symptoms of paresthesia Tunnel vision      Seizure Disorder , Most recent seizure occurred < 1  month ago   , anticonvulsant levels are recent < 1 month. FOCAL SEIZURE 3 WKS AGO. STARTED ON [place] AND NO FURTHER SEIZURE ACTIVITY  Brain Tumor     Brain tumor  Oligodendroglioma determined by biopsy of brain              Endocrine:  Denies Diabetes. Denies Hypothyroidism.          Psych:  Psychiatric History anxiety               Physical Exam  General: Well nourished, Cooperative, Alert and Oriented    Airway:  Mouth Opening: Normal  Tongue: Normal  Neck ROM: Normal ROM    Dental:  Intact        Anesthesia Assessment: Preoperative EQUATION    Planned Procedure: Procedure(s) (LRB):  CRANIOTOMY (N/A)  Requested Anesthesia Type:General  Surgeon: Michele Schuler MD  Service: Neurosurgery  Known or anticipated Date of Surgery:7/27/2023    Surgeon notes: reviewed    Electronic QUestionnaire Assessment completed via nurse interview with patient.        Triage considerations:     The patient has no apparent active cardiac condition (No unstable coronary Syndrome such as severe unstable angina or recent [<1 month] myocardial infarction, decompensated CHF, severe valvular   disease or significant arrhythmia)    Previous anesthesia records:[facility] and No problems  6/13/2023   BIOPSY, BRAIN, STEREOTACTIC - LEFT PARIETAL BRAIN BIOPSY (Left: Head)   Airway:  Mouth Opening: Normal  TM Distance: Normal  Tongue: Normal  Neck ROM: Normal ROM       Airway - Non-Surgical Placement Date: 06/13/23; Placement Time: 0720 (created via procedure documentation); Mask Ventilation: Easy; Intubated: Postinduction; Blade: Salas #3; Airway Device Size: 7.5; Placement Verified By: Capnometry; Complicating Factors: None; Intubation Findings: Bilateral breath sounds, Atraumatic/Condition of teeth unchanged; Securment: Lips; Removal Date: 06/13/23;  Removal Time: 0935     Last PCP note: 6-12 months ago , [facility]   Subspecialty notes: Neurosurgery, Radiology/ONC, Urology    Other important co-morbidities:  BRAIN TUMOR- OLIGODENDROGLIOMA        Tests already available:  Available tests,  within 3 months , within [facility] .   7/6/2023 MRI BRAIN FUNCTIONAL W/O A PHYSICIAN, 6/28/2023 MRI BRAIN STEALTH W/O FIDUCIALS W CONTRAST, 6/14/2023 CT HEAD W/O CONTRAST, 6/14/2023 PHOS, MG,BMP, CBC, 6/7/2023 UA, PT/INR, PTT, EKG, 6/5/2023 MRI BRAIN W W/O CONTRAST, 5/24/2023 CMP          Instructions given. (See in Nurse's note)    Optimization:  Anesthesia Preop Clinic Assessment  Indicated    Medical Opinion Indicated         Plan:    Testing:  T&S   Pre-anesthesia  visit       Visit focus: concerns in complex and/or prolonged anesthesia     Consultation:IM Perioperative HospitalistOR NP     Patient  has previously scheduled Medical Appointment:NONE    Navigation: Tests Scheduled. TBD             Consults scheduled.TBD             Results will be tracked by Preop Clinic.  Bernadette Nicholson RN BSN    MEDICAL CLEARANCE  Patient is optimized     Patient/ care giver/ Family member was instructed to call and update me about any changes to health,  medication, office visits ,testing out side of the [facility] , hospitalizations between now and surgery       Kate Mendez MD  Internal Medicine  [facility]   Cell Phone- [phone]     COVID vaccination status -none

## 2023-07-19 NOTE — DISCHARGE INSTRUCTIONS
Your surgery has been scheduled for:______7/27/23____________________________________    You should report to:  ____[facility], located on the [place] side of the first floor of the           [facility] ([phone])  _x___[facility], located on the [facility] side of the            Second floor of the [facility] ([phone])  ____3rd Floor [facility]SCU located on the [facility] side of the [facility] [phone]  ____[facility]: located at [address] [facility] [place] [address]. [address]     Please Note   Tell your doctor if you take Aspirin, products containing Aspirin, herbal medications  or blood thinners, such as Coumadin, Ticlid, or Plavix.  (Consult your provider regarding holding or stopping before surgery).  Arrange for someone to drive you home following surgery.  You will not be allowed to leave the surgical facility alone or drive yourself home following sedation and anesthesia.    Before Surgery  Stop taking all herbal medications, vitamins, and supplements 7 days prior to surgery  No Motrin/Advil (Ibuprofen) 7 days before surgery  No Aleve (Naproxen) 7 days before surgery  Stop Taking Asprin, products containing Asprin _7____days before surgery  Stop taking blood thinners_______days before surgery  No Goody's/BC  Powder 7 days before surgery  Refrain from drinking alcoholic beverages for 24hours before and after surgery  Stop or limit smoking ___7______days before surgery  You may take Tylenol for pain    Night before Surgery  Do not eat or drink after midnight  Take a shower or bath (shower is recommended).  Bathe with Hibiclens soap or an antibacterial soap from the neck down.  If not supplied by your surgeon, hibiclens soap will need to be purchased over the counter in pharmacy.  Rinse soap off thoroughly.  Shampoo your hair with your regular shampoo    The Day of  Surgery  Take another bath or shower with hibiclens or any antibacterial soap, to reduce the chance of infection.  Take heart and blood pressure medications with a small sip of water, as advised by the perioperative team.  Do not take fluid pills  You may brush your teeth and rinse your mouth, but do not swall any additional water.   Do not apply perfumes, powder, body lotions or deodorant on the day of surgery.  Nail polish should be removed.  Do not wear makeup or moisturizer  Wear comfortable clothes, such as a button front shirt and loose fitting pants.  Leave all jewelry, including body piercings, and valuables at home.    Bring any devices you will neeed after surgery such as crutches or canes.  If you have sleep apnea, please bring your CPAP machine  In the event that your physical condition changes including the onset of a cold or respiratory illness, or if you have to delay or cancel your surgery, please notify your surgeon.     Anesthesia: General Anesthesia     You are watched continuously during your procedure by your anesthesia provider.     Youre due to have surgery. During surgery, youll be given medicine called anesthesia or anesthetic. This will keep you comfortable and pain-free. Your anesthesia provider will use general anesthesia.  What is general anesthesia?  General anesthesia puts you into a state like deep sleep. It goes into the bloodstream (IV anesthetics), into the lungs (gas anesthetics), or both. You feel nothing during the procedure. You will not remember it. During the procedure, the anesthesia provider monitors you continuously. He or she checks your heart rate and rhythm, blood pressure, breathing, and blood oxygen.  IV anesthetics. IV anesthetics are given through an IV line in your arm. Theyre often given first. This is so you are asleep before a gas anesthetic is started. Some kinds of IV anesthetics relieve pain. Others relax you. Your doctor will decide which kind is best in  your case.  Gas anesthetics. Gas anesthetics are breathed into the lungs. They are often used to keep you asleep. They can be given through a facemask or a tube placed in your larynx or trachea (breathing tube).  If you have a facemask, your anesthesia provider will most likely place it over your nose and mouth while youre still awake. Youll breathe oxygen through the mask as your IV anesthetic is started. Gas anesthetic may be added through the mask.  If you have a tube in the larynx or trachea, it will be inserted into your throat after youre asleep.  Anesthesia tools and medicines  You will likely have:  IV anesthetics. These are put into an IV line into your bloodstream.  Gas anesthetics. You breathe these anesthetics into your lungs, where they pass into your bloodstream.  Pulse oximeter. This is a small clip that is attached to the end of your finger. This measures your blood oxygen level.  Electrocardiography leads (electrodes). These are small sticky pads that are placed on your chest. They record your heart rate and rhythm.  Blood pressure cuff. This reads your blood pressure.  Risks and possible complications  General anesthesia has some risks. These include:  Breathing problems  Nausea and vomiting  Sore throat or hoarseness (usually temporary)  Allergic reaction to the anesthetic  Irregular heartbeat (rare)  Cardiac arrest (rare)   Anesthesia safety  Follow all instructions you are given for how long not to eat or drink before your procedure.  Be sure your doctor knows what medicines and drugs you take. This includes over-the-counter medicines, herbs, supplements, alcohol or other drugs. You will be asked when those were last taken.  Have an adult family member or friend drive you home after the procedure.  For the first 24 hours after your surgery:  Do not drive or use heavy equipment.  Do not make important decisions or sign legal documents. If important decisions or signing legal documents is  necessary during the first 24 hours after surgery, have a trusted family member or spouse act on your behalf.  Avoid alcohol.  Have a responsible adult stay with you. He or she can watch for problems and help keep you safe.  Date Last Reviewed: 12/1/2016 © 2000-2017 [employer]. [address] [place] [address], [place] [address]. All rights reserved. This information is not intended as a substitute for professional medical care. Always follow your healthcare professional's instructions.

## 2023-07-20 ENCOUNTER — OFFICE VISIT (OUTPATIENT)
Dept: INTERNAL MEDICINE | Facility: CLINIC | Age: 43
End: 2023-07-20
Payer: COMMERCIAL

## 2023-07-20 ENCOUNTER — HOSPITAL ENCOUNTER (OUTPATIENT)
Dept: PREADMISSION TESTING | Facility: HOSPITAL | Age: 43
Discharge: HOME | End: 2023-07-20
Attending: NEUROLOGICAL SURGERY | Admitting: NEUROLOGICAL SURGERY
Payer: COMMERCIAL

## 2023-07-20 VITALS
SYSTOLIC BLOOD PRESSURE: 116 MMHG | DIASTOLIC BLOOD PRESSURE: 68 MMHG | HEIGHT: 73 IN | BODY MASS INDEX: 23.94 KG/M2 | RESPIRATION RATE: 16 BRPM | TEMPERATURE: 98 F | HEART RATE: 74 BPM | OXYGEN SATURATION: 99 % | WEIGHT: 180.63 LBS

## 2023-07-20 DIAGNOSIS — Z01.818 PREOP EXAMINATION: Primary | ICD-10-CM

## 2023-07-20 DIAGNOSIS — F41.1 GAD (GENERALIZED ANXIETY DISORDER): ICD-10-CM

## 2023-07-20 DIAGNOSIS — Z86.19 HISTORY OF CLOSTRIDIUM DIFFICILE INFECTION: ICD-10-CM

## 2023-07-20 DIAGNOSIS — N28.9 KIDNEY LESION: ICD-10-CM

## 2023-07-20 DIAGNOSIS — T78.40XD ALLERGY, SUBSEQUENT ENCOUNTER: ICD-10-CM

## 2023-07-20 DIAGNOSIS — K21.9 GASTROESOPHAGEAL REFLUX DISEASE, UNSPECIFIED WHETHER ESOPHAGITIS PRESENT: ICD-10-CM

## 2023-07-20 DIAGNOSIS — D49.6 BRAIN TUMOR: ICD-10-CM

## 2023-07-20 PROBLEM — T78.40XA ALLERGIES: Status: ACTIVE | Noted: 2023-07-20

## 2023-07-20 PROCEDURE — 99215 PR OFFICE/OUTPT VISIT, EST, LEVL V, 40-54 MIN: ICD-10-PCS | Mod: S$GLB,,, | Performed by: HOSPITALIST

## 2023-07-20 PROCEDURE — 99999 PR PBB SHADOW E&M-EST. PATIENT-LVL II: CPT | Mod: PBBFAC,,, | Performed by: HOSPITALIST

## 2023-07-20 PROCEDURE — 1160F RVW MEDS BY RX/DR IN RCRD: CPT | Mod: CPTII,S$GLB,, | Performed by: HOSPITALIST

## 2023-07-20 PROCEDURE — 1159F PR MEDICATION LIST DOCUMENTED IN MEDICAL RECORD: ICD-10-PCS | Mod: CPTII,S$GLB,, | Performed by: HOSPITALIST

## 2023-07-20 PROCEDURE — 1160F PR REVIEW ALL MEDS BY PRESCRIBER/CLIN PHARMACIST DOCUMENTED: ICD-10-PCS | Mod: CPTII,S$GLB,, | Performed by: HOSPITALIST

## 2023-07-20 PROCEDURE — 99215 OFFICE O/P EST HI 40 MIN: CPT | Mod: S$GLB,,, | Performed by: HOSPITALIST

## 2023-07-20 PROCEDURE — 99999 PR PBB SHADOW E&M-EST. PATIENT-LVL II: ICD-10-PCS | Mod: PBBFAC,,, | Performed by: HOSPITALIST

## 2023-07-20 PROCEDURE — 1159F MED LIST DOCD IN RCRD: CPT | Mod: CPTII,S$GLB,, | Performed by: HOSPITALIST

## 2023-07-20 RX ORDER — LEVOCETIRIZINE DIHYDROCHLORIDE 5 MG/1
5 TABLET, FILM COATED ORAL NIGHTLY
COMMUNITY

## 2023-07-20 NOTE — ASSESSMENT & PLAN NOTE
He seems to be doing fairly well with anxiety  He is taking Lexapro 15 mg once a day   He has very good family support       I suggest monitoring the sodium as SIADH from Lexapro  use and hypersecretion of ADH associated with surgery can reduce sodium in the perioperative period

## 2023-07-20 NOTE — PROGRESS NOTES
[facility]  Progress Note    Patient Name: Carlos Jacobson  MRN: [id number]  Date of Evaluation- 07/20/2023  PCP- Lionel Javed MD    Future cases for Kavon, Father Edison ARTHUR [[id number]]       Case ID Status Date Time [facility] Procedure Provider Location    [id number] [facility] 7/27/2023  7:00  CRANIOTOMY Michele Schuler MD [[id number]] [facility] OR 2ND FLR            HPI:  History of present illness- I had the pleasure of meeting this pleasant 42 y.o. gentleman in the pre op clinic prior to his elective  Neuro  surgery. The patient is new to me .Mr Gonzalze was accompanied by mother Ms Damian.  I have offered to have his family member on the phone during the consultation process       I have obtained the history by speaking to the patient and by reviewing the electronic health records.    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    The plan is for him to have a craniotomy on July 27th followed by chemo and radiation   He plans on having the treatment set in the [facility] area where his parents are     2 Advils  helps the headaches      He has No one-sided weakness    Relevant health conditions of significance for the perioperative period/ History of presenting illness -    Subjectively describes health as good      Was running 3 plus times a week   Goes to the gym    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    He is a healthy person and is not known to have blood pressure problem, diabetes or  elevated cholesterol  He is not known to have heart or lung problems    Health conditions of significance for the perioperative period      - Anxiety   - Possible COVID- 2022-he was not very sick with it and  has recovered-he did not require supplemental oxygen, intubation or hospitalization-he is breathing fine  - Allergies    Lives in a 4 story building   Lives on the 2 nd level  He has no children    His parents live in the [facility] area  He has 3 siblings   He is going to recover at the parents house    His bedroom at his parents place he is upstairs but can recover downstairs if needed            Subjective/ Objective:     Chief complaint-Preoperative evaluation, Perioperative Medical management, complication reduction plan     Active cardiac conditions- none    Revised cardiac risk index predictors- none    Functional capacity -Examples of physical activity, runs 3 times a week, lifts weight ,   can take 1 flight of stairs-, 1-2 flights of stairs---- He can undertake all the above activities without  chest pain,chest tightness, Shortness of breath ,dizziness,lightheadedness making his exercise tolerance more,   than 4 Mets.     Review of Systems   Constitutional:  Negative for chills and fever.        No unusual weight changes     HENT:          STOPBANG score 1 / 8      Male gender   Eyes:         As noted   Respiratory:          No cough , phlegm    No Hemoptysis   Cardiovascular:         As noted   Gastrointestinal:         Bowels- Regular -once daily  No stomach upset   No overt GI/ blood losses  Not known to have any testicular lumps   Endocrine:        Prednisone use > 20 mg daily for 3 weeks- none   Genitourinary:  Negative for dysuria.        No urinary hesitancy    Musculoskeletal:           No unusual muscle/ joint pains   Skin:  Negative for rash.   Neurological:  Negative for syncope.        No unilateral weakness   Hematological:         Current use of Anticoagulants  None   He has been off  Advil for a long time   Psychiatric/Behavioral:          Anxiety - Controlled     No SI/HI   No vascular stenting   No past medical history pertinent negatives.    Suggested avoidance of cigar- affects  healing- once a year     No anesthesia, bleeding, cardiac problems , PONVwith previous surgeries/procedures.  Medications and Allergies reviewed in epic.     FH- No anesthesia,bleeding / venous thrombosis ,  in family      Physical Exam    Vitals - 1 value per visit 7/20/2023   SYSTOLIC 116   DIASTOLIC 68   Pulse 74   Temp 98.1   Resp 16   SPO2 99   Weight (lb) 180.6   Weight (kg) 81.92   Height 73   BMI (Calculated) 23.8       Physical Exam  Constitutional-   General appearance-Conscious,Coherent  Eyes- No conjunctival icterus,pupils  round  and reactive to light   ENT-Oral cavity- moist    , Hearing grossly normal   Neck- No thyromegaly ,Trachea -central, No jugular venous distension,   No Carotid Bruit   Cardiovascular -Heart Sounds- Normal  and  no murmur   , No gallop rhythm   Respiratory - Normal Respiratory Effort, Normal breath sounds,  no wheeze , and  no forced expiratory wheeze    Peripheral pitting pedal edema-- none , no calf pain   Gastrointestinal -Soft abdomen, No palpable masses, Non Tender,Liver,Spleen not palpable. No-- free fluid and shifting dullness  Musculoskeletal- No finger Clubbing. Strength grossly normal   Lymphatic-No Palpable cervical, axillary,Inguinal lymphadenopathy   Psychiatric - normal effect,Orientation  Rt Dorsalis pedis pulses-palpable    Lt Dorsalis pedis pulses- palpable   Rt Posterior tibial pulses -palpable   Left posterior tibial pulses -palpable   Miscellaneous -  no renal bruit   Investigations  Lab and Imaging have been reviewed in [facility].    Review of Medicine tests    EKG- I had independently reviewed the EKG from--6/7/2023  It was reported to be showing     Sinus bradycardia   Otherwise normal ECG   When compared with ECG of 17-OCT-2022 14:21,   No significant change  was found     His sinus bradycardia could be from his physical fitness   He does not have any suggestions of overt hypothyroidism       Review of clinical lab tests:  Lab Results   Component Value Date    CREATININE 0.78 06/14/2023    HGB 14.0 06/14/2023     06/14/2023           Review of old records- Was done and information gathered regards to events leading to surgery and health conditions of significance in the perioperative period.        Preoperative cardiac risk assessment-  The patient does not have any active cardiac conditions . Revised cardiac risk index predictors-0 ---.Functional capacity is more than 4 Mets. He will be undergoing a Neuro procedure that carries a Moderate Risk risk     Risk of a major Cardiac event ( Defined as death, myocardial infarction, or cardiac arrest at 30 days after noncardiac surgery), based on RCRI score     -3.9%       No further cardiac work up is indicated prior to proceeding with the surgery          American Society of Anesthesiologists Physical status classification ( ASA ) class: 2     Postoperative pulmonary complication risk assessment:      ARISCAT ( Canet) risk index- risk class -  Low          Assessment/Plan:     SUKI (generalized anxiety disorder)  He seems to be doing fairly well with anxiety  He is taking Lexapro 15 mg once a day   He has very good family support       I suggest monitoring the sodium as SIADH from Lexapro  use and hypersecretion of ADH associated with surgery can reduce sodium in the perioperative period    Brain tumor  He is planned to have surgery followed by chemoradiation  He is under oncology, radiology care    He is not known to have any cancer elsewhere    He has been having weight can not episode so for the past 5-6 months which she would 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  No evidence for metastatic disease.    I have discussed this with him and his mother and suggested follow-up   He is a nonsmoker   He has no blood in the urine       Acid reflux  He has had what sounds like acid reflux in the postoperative time after his brain biopsy in June 2023   He has occasionally reflux symptoms     GERD Symptoms -  Does not sound Cardiac   Tips to control reflux discussed      I suggest aspiration precautions  Controlled    History of Clostridium difficile infection  2014   I suggest that the care team be aware of his previous history of C diff infection and suggest care with antibiotic use and hospitalization   He had no recurrence of C diff  He uses probiotic that he  is holding for surgery        Preventive perioperative care    Thromboembolic prophylaxis:  His risk factors for thrombosis include cancer surgical procedure and age.I suggest  thromboembolic prophylaxis ( mechanical/pharmacological, weighing the risk benefits of pharmacological agent use considering vaishali procedural bleeding )  during the perioperative period.I suggested being active in the post operative period.      Postoperative pulmonary complication prophylaxis-Risk factors for post operative pulmonary complications include surgery lasting over 3 hours- I suggest incentive spirometry use, early ambulation, and end tidal carbon dioxide monitoring  , oral care , head end of bed elevation      Renal complication prophylaxis- . I suggest keeping him well hydrated in the perioperative period.      Surgical site Infection Prophylaxis-I  suggest appropriate antibiotic for Prophylaxis against Surgical site infections  No reported Staph infection  Skin antibacterial discussed     \    This visit was  focused on Preoperative evaluation, Perioperative Medical management, complication reduction plans. I suggest that the patient follows up with primary care or relevant sub specialists for ongoing health care.    I appreciate the opportunity to be involved in this patients care. Please feel free to contact me if there were any questions about this consultation.    Patient is optimized    Patient/ care giver/ Family member was instructed to call and update me about any changes to health,  medication, office visits ,testing out side of the [facility] operative care center , hospitalizations between now and surgery      Kate Mendez MD  Internal Medicine  [facility]   Cell Phone- [phone]    COVID vaccination status -none    COVID screening     No fever   No cough   No SOB  No sore throat   No loss of taste or smell   No muscle aches   No nausea, vomiting , diarrhea -    I have spent -130----- minutes of time which includes, time spent to prepare to see the patient , obtaining history ,performing examination, counseling/Educating the patient , Documenting clinical information in the record    --  7/25/2023- 1840    Called to follow up , spoke to him to address any concerns with the up coming surgery or any questions on Medication instructions -  Doing well ,No changes to Medication, Health -

## 2023-07-20 NOTE — ASSESSMENT & PLAN NOTE
2014   I suggest that the care team be aware of his previous history of C diff infection and suggest care with antibiotic use and hospitalization   He had no recurrence of C diff  He uses probiotic that he  is holding for surgery

## 2023-07-20 NOTE — ASSESSMENT & PLAN NOTE
He is doing good with allergies and has seasonal allergies  He is not known to have a fever, asthma, eczema

## 2023-07-20 NOTE — ASSESSMENT & PLAN NOTE
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

## 2023-07-20 NOTE — ASSESSMENT & PLAN NOTE
May 2023  Small, enhancing left renal lesion suspicious for neoplasm.  No evidence for metastatic disease.    I have discussed this with him and his mother and suggested follow-up   He is a nonsmoker   He has no blood in the urine

## 2023-07-20 NOTE — ASSESSMENT & PLAN NOTE
He has had what sounds like acid reflux in the postoperative time after his brain biopsy in June 2023   He has occasionally reflux symptoms     GERD Symptoms -  Does not sound Cardiac   Tips to control reflux discussed      I suggest aspiration precautions  Controlled

## 2023-07-20 NOTE — OUTPATIENT SUBJECTIVE & OBJECTIVE
Outpatient Subjective & Objective     Chief complaint-Preoperative evaluation, Perioperative Medical management, complication reduction plan     Active cardiac conditions- none    Revised cardiac risk index predictors- none    Functional capacity -Examples of physical activity, runs 3 times a week, lifts weight ,   can take 1 flight of stairs-, 1-2 flights of stairs---- He can undertake all the above activities without  chest pain,chest tightness, Shortness of breath ,dizziness,lightheadedness making his exercise tolerance more,   than 4 Mets.     Review of Systems   Constitutional:  Negative for chills and fever.        No unusual weight changes     HENT:          STOPBANG score 1 / 8      Male gender   Eyes:         As noted   Respiratory:          No cough , phlegm    No Hemoptysis   Cardiovascular:         As noted   Gastrointestinal:         Bowels- Regular -once daily  No stomach upset   No overt GI/ blood losses  Not known to have any testicular lumps   Endocrine:        Prednisone use > 20 mg daily for 3 weeks- none   Genitourinary:  Negative for dysuria.        No urinary hesitancy    Musculoskeletal:           No unusual muscle/ joint pains   Skin:  Negative for rash.   Neurological:  Negative for syncope.        No unilateral weakness   Hematological:         Current use of Anticoagulants  None   He has been off Advil for a long time   Psychiatric/Behavioral:          Anxiety - Controlled     No SI/HI   No vascular stenting   No past medical history pertinent negatives.    Suggested avoidance of cigar- affects  healing- once a year     No anesthesia, bleeding, cardiac problems , PONVwith previous surgeries/procedures.  Medications and Allergies reviewed in epic.     FH- No anesthesia,bleeding / venous thrombosis ,  in family      Physical Exam    Vitals - 1 value per visit 7/20/2023   SYSTOLIC 116   DIASTOLIC 68   Pulse 74   Temp 98.1   Resp 16   SPO2 99   Weight (lb) 180.6   Weight (kg) 81.92   Height  73   BMI (Calculated) 23.8       Physical Exam  Constitutional-   General appearance-Conscious,Coherent  Eyes- No conjunctival icterus,pupils  round  and reactive to light   ENT-Oral cavity- moist    , Hearing grossly normal   Neck- No thyromegaly ,Trachea -central, No jugular venous distension,   No Carotid Bruit   Cardiovascular -Heart Sounds- Normal  and  no murmur   , No gallop rhythm   Respiratory - Normal Respiratory Effort, Normal breath sounds,  no wheeze , and  no forced expiratory wheeze    Peripheral pitting pedal edema-- none , no calf pain   Gastrointestinal -Soft abdomen, No palpable masses, Non Tender,Liver,Spleen not palpable. No-- free fluid and shifting dullness  Musculoskeletal- No finger Clubbing. Strength grossly normal   Lymphatic-No Palpable cervical, axillary,Inguinal lymphadenopathy   Psychiatric - normal effect,Orientation  Rt Dorsalis pedis pulses-palpable    Lt Dorsalis pedis pulses- palpable   Rt Posterior tibial pulses -palpable   Left posterior tibial pulses -palpable   Miscellaneous -  no renal bruit   Investigations  Lab and Imaging have been reviewed in epic.    Review of Medicine tests    EKG- I had independently reviewed the EKG from--6/7/2023  It was reported to be showing     Sinus bradycardia   Otherwise normal ECG   When compared with ECG of 17-OCT-2022 14:21,   No significant change was found     His sinus bradycardia could be from his physical fitness   He does not have any suggestions of overt hypothyroidism       Review of clinical lab tests:  Lab Results   Component Value Date    CREATININE 0.78 06/14/2023    HGB 14.0 06/14/2023     06/14/2023           Review of old records- Was done and information gathered regards to events leading to surgery and health conditions of significance in the perioperative period.    Outpatient Subjective & Objective

## 2023-07-20 NOTE — HPI
History of present illness- I had the pleasure of meeting this pleasant 42 y.o. gentleman in the pre op clinic prior to his elective  Neuro  surgery. The patient is new to me .Mr Gonzalez was accompanied by mother Ms Damian.  I have offered to have his family member on the phone during the consultation process       I have obtained the history by speaking to the patient and by reviewing the electronic health records.    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    The plan is for him to have a craniotomy on July 27th followed by chemo and radiation   He plans on having the treatment set in the [facility] where his parents are     2 Advils  helps the headaches      He has No one-sided weakness    Relevant health conditions of significance for the perioperative period/ History of presenting illness -    Subjectively describes health as good      Was running 3 plus times a week   Goes to the gym    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    He is a healthy person and is not known to have blood pressure problem, diabetes or elevated cholesterol  He is not known to have heart or lung problems    Health conditions of significance for the perioperative period      - Anxiety   - Possible COVID- 2022-he was not very sick with it and  has recovered-he did not require supplemental oxygen, intubation or hospitalization-he is breathing fine  - Allergies    Lives in a 4 story building   Lives on the 2 nd  level  He has no children    His parents live in the [facility] area  He has 3 siblings   He is going to recover at the parents house    His bedroom at his parents place he is upstairs but can recover downstairs if needed

## 2023-07-24 NOTE — PROGRESS NOTES
Neurosurgery History & Physical    Patient ID: Carlos Jacobson is a 42 y.o. male.    Chief Complaint   Patient presents with    Post-op Evaluation     4 week POV, brain biopsy. Reports no new symptoms.       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  Patient returns today for a general postoperative visit, review of pathology, and discussion of next steps.  The patient denies any issues with his wound.  He did see Dr. Segura this morning at the request of Dr. Mabry with Neuro-Oncology.    History of Present Illness 6/13/2023: Mr. Jacobson is a 42 year old male who presents today for evaluation of recent brain imaging. Over the last 6 months, he reports pressure in his head and tinnitus at times. He has associated these symptoms with anxiety which he has struggled with over the last few years. He reports two episodes of left foot numbness and tunnel vision in recent weeks which lead him to seek evaluation at the urgent care. A CT was performed there which showed a brain lesion and PCP ordered MRI brain and CT C/A/P.      He feels that he has trouble with coordination when walking down stairs. Otherwise denies other neurologic symptom.      He has seen Urology regarding CT CAP which demonstrates small left renal lesion. They recommended follow up in 3 months for monitoring.     Review of Systems  Denies fevers, chills, nausea, or vomiting.    Past Medical History:   Diagnosis Date    Allergy     Anxiety     Brain tumor     Cancer     Clostridium difficile colitis 04/28/2014    GERD (gastroesophageal reflux disease)     Renal lesion      Social History     Socioeconomic History    Marital status: Single   Tobacco Use    Smoking status: Some Days     Types: Cigars    Smokeless tobacco: Never    Tobacco comments:     Cigar once a year   Substance and Sexual  "Activity    Alcohol use: No     Comment: occasionally    Drug use: No    Sexual activity: Never     Family History   Problem Relation Age of Onset    Hyperlipidemia Mother     Hyperlipidemia Father     No Known Problems Sister     Hyperlipidemia Brother     Hyperlipidemia Maternal Uncle     Diabetes Maternal Grandmother     Lung cancer Maternal Grandmother      Review of patient's allergies indicates:  No Known Allergies    Current Outpatient Medications:     acetaminophen (TYLENOL) 325 MG tablet, Take 2 tablets (650 mg total) by mouth every 4 (four) hours as needed for Pain. (Patient taking differently: Take 650 mg by mouth daily as needed for Pain.), Disp: 20 tablet, Rfl: 0    ascorbic acid, vitamin C, (VITAMIN C) 1000 MG tablet, Take 1,000 mg by mouth once daily., Disp: , Rfl:     EScitalopram oxalate (LEXAPRO) 10 MG tablet, Take 1.5 tablets (15 mg total) by mouth once daily., Disp: 135 tablet, Rfl: 4    levETIRAcetam (KEPPRA) 750 MG Tab, Take 1 tablet (750 mg total) by mouth 2 (two) times daily., Disp: 180 tablet, Rfl: 3    memantine (NAMENDA TITRATION PACK) tablet pack, Take by mouth As instructed (Follow package directions). Follow package directions., Disp: 1 packet, Rfl: 0    memantine (NAMENDA) 10 MG Tab, Take 1 tablet (10 mg total) by mouth 2 (two) times daily., Disp: 60 tablet, Rfl: 4    multivitamin (THERAGRAN) per tablet, Take 1 tablet by mouth once daily., Disp: , Rfl:     Lactobacillus acidophilus (PROBIOTIC ORAL), Take 1 tablet by mouth once daily., Disp: , Rfl:     levocetirizine (XYZAL) 5 MG tablet, Take 5 mg by mouth every evening., Disp: , Rfl:     oxyCODONE-acetaminophen (PERCOCET) 7.5-325 mg per tablet, Take 1 tablet by mouth every 4 (four) hours as needed for Pain (Pain unrelieved by tylenol). (Patient not taking: Reported on 6/30/2023), Disp: 10 tablet, Rfl: 0  Blood pressure 112/71, pulse 84, resp. rate 18, height 6' 1" (1.854 m), weight 80.1 kg (176 lb 9.4 oz).      Neurologic Exam  AAOx4, " NAD  Wound well healed  Strength and sensation grossly intact bilaterally  No gross visual field deficits.    Imaging:  Functional MRI of the brain dated 07/06/2023 and MRI of the brain without contrast dated 06/28/2023 is personally reviewed and discussed with the patient.  The previously noted infiltrative lesion is unchanged from prior MRI when compared to MRI from 06/05/2023.  Trajectory of left parietal stereotactic biopsy is noted.    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  He is doing very well.  He is had extensive discussion with Dr. Segura and myself with regard to next steps.  He has also met with Dr. Armenta.  After thorough discussion and presentation of his case at tumor board it is felt that maximal safe resection of the lesion is reasonable as a next step.    We discussed using a minimally invasive tubular retractor through a left parietal access in order to resect the lesion.  We discussed possible postoperative deficits including, but not limited to, vision loss, contralateral weakness or sensory deficit, cognitive changes, and more serious risks including, but not limited to coma and death.  After thorough discussion, the patient feels he would like to proceed with further resection.  I will coordinate with Dr. Segura's staff in order to find a date where this can be performed as a co surgery at [facility].

## 2023-07-27 ENCOUNTER — ANESTHESIA (OUTPATIENT)
Dept: SURGERY | Facility: HOSPITAL | Age: 43
DRG: 027 | End: 2023-07-27
Payer: COMMERCIAL

## 2023-07-27 ENCOUNTER — HOSPITAL ENCOUNTER (INPATIENT)
Facility: HOSPITAL | Age: 43
LOS: 2 days | Discharge: HOME | DRG: 027 | End: 2023-07-29
Attending: NEUROLOGICAL SURGERY | Admitting: NEUROLOGICAL SURGERY
Payer: COMMERCIAL

## 2023-07-27 DIAGNOSIS — Z01.818 PREOP TESTING: ICD-10-CM

## 2023-07-27 DIAGNOSIS — C71.9 OLIGODENDROGLIOMA DETERMINED BY BIOPSY OF BRAIN: Primary | ICD-10-CM

## 2023-07-27 DIAGNOSIS — D49.6 BRAIN TUMOR: ICD-10-CM

## 2023-07-27 DIAGNOSIS — R56.9 SEIZURE: ICD-10-CM

## 2023-07-27 PROBLEM — F32.A DEPRESSION: Status: ACTIVE | Noted: 2023-07-27

## 2023-07-27 LAB
ABO + RH BLD: NORMAL
ALBUMIN SERPL BCP-MCNC: 4.3 G/DL (ref 3.5–5.2)
ALP SERPL-CCNC: 60 U/L (ref 55–135)
ALT SERPL W/O P-5'-P-CCNC: 19 U/L (ref 10–44)
ANION GAP SERPL CALC-SCNC: 12 MMOL/L (ref 8–16)
APTT PPP: 26.4 SEC (ref 21–32)
AST SERPL-CCNC: 25 U/L (ref 10–40)
BASOPHILS # BLD AUTO: 0.03 K/UL (ref 0–0.2)
BASOPHILS NFR BLD: 0.5 % (ref 0–1.9)
BILIRUB SERPL-MCNC: 0.5 MG/DL (ref 0.1–1)
BLD GP AB SCN CELLS X3 SERPL QL: NORMAL
BUN SERPL-MCNC: 11 MG/DL (ref 6–20)
CALCIUM SERPL-MCNC: 9.8 MG/DL (ref 8.7–10.5)
CHLORIDE SERPL-SCNC: 104 MMOL/L (ref 95–110)
CO2 SERPL-SCNC: 25 MMOL/L (ref 23–29)
CREAT SERPL-MCNC: 0.8 MG/DL (ref 0.5–1.4)
DIFFERENTIAL METHOD: NORMAL
EOSINOPHIL # BLD AUTO: 0.1 K/UL (ref 0–0.5)
EOSINOPHIL NFR BLD: 0.8 % (ref 0–8)
ERYTHROCYTE [DISTWIDTH] IN BLOOD BY AUTOMATED COUNT: 12.1 % (ref 11.5–14.5)
EST. GFR  (NO RACE VARIABLE): >60 ML/MIN/1.73 M^2
GLUCOSE SERPL-MCNC: 88 MG/DL (ref 70–110)
HCT VFR BLD AUTO: 44.9 % (ref 40–54)
HGB BLD-MCNC: 15.4 G/DL (ref 14–18)
IMM GRANULOCYTES # BLD AUTO: 0.01 K/UL (ref 0–0.04)
IMM GRANULOCYTES NFR BLD AUTO: 0.2 % (ref 0–0.5)
INR PPP: 1 (ref 0.8–1.2)
LYMPHOCYTES # BLD AUTO: 1.8 K/UL (ref 1–4.8)
LYMPHOCYTES NFR BLD: 28.9 % (ref 18–48)
MCH RBC QN AUTO: 30.9 PG (ref 27–31)
MCHC RBC AUTO-ENTMCNC: 34.3 G/DL (ref 32–36)
MCV RBC AUTO: 90 FL (ref 82–98)
MONOCYTES # BLD AUTO: 0.6 K/UL (ref 0.3–1)
MONOCYTES NFR BLD: 8.8 % (ref 4–15)
NEUTROPHILS # BLD AUTO: 3.8 K/UL (ref 1.8–7.7)
NEUTROPHILS NFR BLD: 60.8 % (ref 38–73)
NRBC BLD-RTO: 0 /100 WBC
PLATELET # BLD AUTO: 222 K/UL (ref 150–450)
PMV BLD AUTO: 10.2 FL (ref 9.2–12.9)
POCT GLUCOSE: 125 MG/DL (ref 70–110)
POTASSIUM SERPL-SCNC: 4 MMOL/L (ref 3.5–5.1)
PROT SERPL-MCNC: 7.6 G/DL (ref 6–8.4)
PROTHROMBIN TIME: 11.1 SEC (ref 9–12.5)
RBC # BLD AUTO: 4.99 M/UL (ref 4.6–6.2)
SODIUM SERPL-SCNC: 141 MMOL/L (ref 136–145)
SPECIMEN OUTDATE: NORMAL
WBC # BLD AUTO: 6.26 K/UL (ref 3.9–12.7)

## 2023-07-27 PROCEDURE — 63600175 PHARM REV CODE 636 W HCPCS: Performed by: NURSE ANESTHETIST, CERTIFIED REGISTERED

## 2023-07-27 PROCEDURE — D9220A PRA ANESTHESIA: ICD-10-PCS | Mod: ANES,,, | Performed by: ANESTHESIOLOGY

## 2023-07-27 PROCEDURE — 85610 PROTHROMBIN TIME: CPT | Performed by: NEUROLOGICAL SURGERY

## 2023-07-27 PROCEDURE — 80053 COMPREHEN METABOLIC PANEL: CPT | Performed by: NEUROLOGICAL SURGERY

## 2023-07-27 PROCEDURE — 61510 CRNEC TREPH EXC BRN TUM STTL: CPT | Mod: 62,58,, | Performed by: NEUROLOGICAL SURGERY

## 2023-07-27 PROCEDURE — 88331 PATH CONSLTJ SURG 1 BLK 1SPC: CPT | Performed by: STUDENT IN AN ORGANIZED HEALTH CARE EDUCATION/TRAINING PROGRAM

## 2023-07-27 PROCEDURE — 88342 CHG IMMUNOCYTOCHEMISTRY: ICD-10-PCS | Mod: 26,,, | Performed by: STUDENT IN AN ORGANIZED HEALTH CARE EDUCATION/TRAINING PROGRAM

## 2023-07-27 PROCEDURE — 61510 PR EXCIS SUPRATENT BRAIN TUMOR: ICD-10-PCS | Mod: 62,58,, | Performed by: NEUROLOGICAL SURGERY

## 2023-07-27 PROCEDURE — 25000003 PHARM REV CODE 250: Performed by: PHYSICIAN ASSISTANT

## 2023-07-27 PROCEDURE — 36000710: Performed by: NEUROLOGICAL SURGERY

## 2023-07-27 PROCEDURE — 63600175 PHARM REV CODE 636 W HCPCS: Performed by: NEUROLOGICAL SURGERY

## 2023-07-27 PROCEDURE — 88307 TISSUE EXAM BY PATHOLOGIST: CPT | Performed by: STUDENT IN AN ORGANIZED HEALTH CARE EDUCATION/TRAINING PROGRAM

## 2023-07-27 PROCEDURE — 85025 COMPLETE CBC W/AUTO DIFF WBC: CPT | Performed by: NEUROLOGICAL SURGERY

## 2023-07-27 PROCEDURE — 93010 ELECTROCARDIOGRAM REPORT: CPT | Mod: ,,, | Performed by: INTERNAL MEDICINE

## 2023-07-27 PROCEDURE — 85730 THROMBOPLASTIN TIME PARTIAL: CPT | Performed by: NEUROLOGICAL SURGERY

## 2023-07-27 PROCEDURE — 37000009 HC ANESTHESIA EA ADD 15 MINS: Performed by: NEUROLOGICAL SURGERY

## 2023-07-27 PROCEDURE — 25500020 PHARM REV CODE 255: Performed by: NEUROLOGICAL SURGERY

## 2023-07-27 PROCEDURE — 25000003 PHARM REV CODE 250: Performed by: NURSE ANESTHETIST, CERTIFIED REGISTERED

## 2023-07-27 PROCEDURE — 93005 ELECTROCARDIOGRAM TRACING: CPT

## 2023-07-27 PROCEDURE — 88342 IMHCHEM/IMCYTCHM 1ST ANTB: CPT | Performed by: STUDENT IN AN ORGANIZED HEALTH CARE EDUCATION/TRAINING PROGRAM

## 2023-07-27 PROCEDURE — 88307 TISSUE EXAM BY PATHOLOGIST: CPT | Mod: 26,,, | Performed by: STUDENT IN AN ORGANIZED HEALTH CARE EDUCATION/TRAINING PROGRAM

## 2023-07-27 PROCEDURE — 88307 PR  SURG PATH,LEVEL V: ICD-10-PCS | Mod: 26,,, | Performed by: STUDENT IN AN ORGANIZED HEALTH CARE EDUCATION/TRAINING PROGRAM

## 2023-07-27 PROCEDURE — D9220A PRA ANESTHESIA: Mod: ANES,,, | Performed by: ANESTHESIOLOGY

## 2023-07-27 PROCEDURE — 69990 PR MICROSURG TECHNIQUES,REQ OPER MICROSCOPE: ICD-10-PCS | Mod: 59,,, | Performed by: NEUROLOGICAL SURGERY

## 2023-07-27 PROCEDURE — A9585 GADOBUTROL INJECTION: HCPCS | Performed by: NEUROLOGICAL SURGERY

## 2023-07-27 PROCEDURE — 25000003 PHARM REV CODE 250

## 2023-07-27 PROCEDURE — D9220A PRA ANESTHESIA: ICD-10-PCS | Mod: CRNA,,, | Performed by: NURSE ANESTHETIST, CERTIFIED REGISTERED

## 2023-07-27 PROCEDURE — 63600175 PHARM REV CODE 636 W HCPCS

## 2023-07-27 PROCEDURE — 37000008 HC ANESTHESIA 1ST 15 MINUTES: Performed by: NEUROLOGICAL SURGERY

## 2023-07-27 PROCEDURE — 88342 IMHCHEM/IMCYTCHM 1ST ANTB: CPT | Mod: 26,,, | Performed by: STUDENT IN AN ORGANIZED HEALTH CARE EDUCATION/TRAINING PROGRAM

## 2023-07-27 PROCEDURE — 88341 IMHCHEM/IMCYTCHM EA ADD ANTB: CPT | Performed by: STUDENT IN AN ORGANIZED HEALTH CARE EDUCATION/TRAINING PROGRAM

## 2023-07-27 PROCEDURE — 20000000 HC ICU ROOM

## 2023-07-27 PROCEDURE — 86920 COMPATIBILITY TEST SPIN: CPT | Performed by: NEUROLOGICAL SURGERY

## 2023-07-27 PROCEDURE — 63600175 PHARM REV CODE 636 W HCPCS: Performed by: STUDENT IN AN ORGANIZED HEALTH CARE EDUCATION/TRAINING PROGRAM

## 2023-07-27 PROCEDURE — 27201037 HC PRESSURE MONITORING SET UP

## 2023-07-27 PROCEDURE — 61781 PR STEREOTACTIC COMP ASSIST PROC,CRANIAL,INTRADURAL: ICD-10-PCS | Mod: ,,, | Performed by: NEUROLOGICAL SURGERY

## 2023-07-27 PROCEDURE — 36415 COLL VENOUS BLD VENIPUNCTURE: CPT | Performed by: NEUROLOGICAL SURGERY

## 2023-07-27 PROCEDURE — 88341 IMHCHEM/IMCYTCHM EA ADD ANTB: CPT | Mod: 26,,, | Performed by: STUDENT IN AN ORGANIZED HEALTH CARE EDUCATION/TRAINING PROGRAM

## 2023-07-27 PROCEDURE — 86900 BLOOD TYPING SEROLOGIC ABO: CPT | Performed by: NEUROLOGICAL SURGERY

## 2023-07-27 PROCEDURE — 25000003 PHARM REV CODE 250: Performed by: NEUROLOGICAL SURGERY

## 2023-07-27 PROCEDURE — 88331 PR  PATH CONSULT IN SURG,W FRZ SEC: ICD-10-PCS | Mod: 26,,, | Performed by: STUDENT IN AN ORGANIZED HEALTH CARE EDUCATION/TRAINING PROGRAM

## 2023-07-27 PROCEDURE — 27201423 OPTIME MED/SURG SUP & DEVICES STERILE SUPPLY: Performed by: NEUROLOGICAL SURGERY

## 2023-07-27 PROCEDURE — 69990 MICROSURGERY ADD-ON: CPT | Mod: 59,,, | Performed by: NEUROLOGICAL SURGERY

## 2023-07-27 PROCEDURE — 36620 ARTERIAL: ICD-10-PCS | Mod: ,,, | Performed by: ANESTHESIOLOGY

## 2023-07-27 PROCEDURE — 36000711: Performed by: NEUROLOGICAL SURGERY

## 2023-07-27 PROCEDURE — 94761 N-INVAS EAR/PLS OXIMETRY MLT: CPT

## 2023-07-27 PROCEDURE — 11000001 HC ACUTE MED/SURG PRIVATE ROOM

## 2023-07-27 PROCEDURE — 88341 PR IHC OR ICC EACH ADD'L SINGLE ANTIBODY  STAINPR: ICD-10-PCS | Mod: 26,,, | Performed by: STUDENT IN AN ORGANIZED HEALTH CARE EDUCATION/TRAINING PROGRAM

## 2023-07-27 PROCEDURE — 93010 EKG 12-LEAD: ICD-10-PCS | Mod: ,,, | Performed by: INTERNAL MEDICINE

## 2023-07-27 PROCEDURE — 36620 INSERTION CATHETER ARTERY: CPT | Mod: ,,, | Performed by: ANESTHESIOLOGY

## 2023-07-27 PROCEDURE — 61781 SCAN PROC CRANIAL INTRA: CPT | Mod: ,,, | Performed by: NEUROLOGICAL SURGERY

## 2023-07-27 PROCEDURE — C1713 ANCHOR/SCREW BN/BN,TIS/BN: HCPCS | Performed by: NEUROLOGICAL SURGERY

## 2023-07-27 PROCEDURE — D9220A PRA ANESTHESIA: Mod: CRNA,,, | Performed by: NURSE ANESTHETIST, CERTIFIED REGISTERED

## 2023-07-27 PROCEDURE — 63600175 PHARM REV CODE 636 W HCPCS: Performed by: PHYSICIAN ASSISTANT

## 2023-07-27 PROCEDURE — 88331 PATH CONSLTJ SURG 1 BLK 1SPC: CPT | Mod: 26,,, | Performed by: STUDENT IN AN ORGANIZED HEALTH CARE EDUCATION/TRAINING PROGRAM

## 2023-07-27 DEVICE — SCREW UN3 AXS SD 1.5X4MM: Type: IMPLANTABLE DEVICE | Site: CRANIAL | Status: FUNCTIONAL

## 2023-07-27 DEVICE — PLATE BONE BUR HOLE COVER 10MM: Type: IMPLANTABLE DEVICE | Site: CRANIAL | Status: FUNCTIONAL

## 2023-07-27 RX ORDER — BACITRACIN ZINC 500 UNIT/G
OINTMENT (GRAM) TOPICAL
Status: DISCONTINUED | OUTPATIENT
Start: 2023-07-27 | End: 2023-07-27 | Stop reason: HOSPADM

## 2023-07-27 RX ORDER — FENTANYL CITRATE 50 UG/ML
INJECTION, SOLUTION INTRAMUSCULAR; INTRAVENOUS
Status: DISPENSED
Start: 2023-07-27 | End: 2023-07-27

## 2023-07-27 RX ORDER — PANTOPRAZOLE SODIUM 40 MG/1
40 TABLET, DELAYED RELEASE ORAL DAILY
Status: DISCONTINUED | OUTPATIENT
Start: 2023-07-28 | End: 2023-07-29 | Stop reason: HOSPADM

## 2023-07-27 RX ORDER — SODIUM CHLORIDE 0.9 % (FLUSH) 0.9 %
10 SYRINGE (ML) INJECTION
Status: DISCONTINUED | OUTPATIENT
Start: 2023-07-27 | End: 2023-07-29 | Stop reason: HOSPADM

## 2023-07-27 RX ORDER — EPHEDRINE SULFATE 50 MG/ML
INJECTION, SOLUTION INTRAVENOUS
Status: DISCONTINUED | OUTPATIENT
Start: 2023-07-27 | End: 2023-07-27

## 2023-07-27 RX ORDER — CETIRIZINE HYDROCHLORIDE 5 MG/1
5 TABLET ORAL DAILY
Status: DISCONTINUED | OUTPATIENT
Start: 2023-07-28 | End: 2023-07-29 | Stop reason: HOSPADM

## 2023-07-27 RX ORDER — ONDANSETRON 2 MG/ML
4 INJECTION INTRAMUSCULAR; INTRAVENOUS EVERY 8 HOURS PRN
Status: DISCONTINUED | OUTPATIENT
Start: 2023-07-27 | End: 2023-07-29 | Stop reason: HOSPADM

## 2023-07-27 RX ORDER — BUPIVACAINE HYDROCHLORIDE AND EPINEPHRINE 5; 5 MG/ML; UG/ML
INJECTION, SOLUTION EPIDURAL; INTRACAUDAL; PERINEURAL
Status: DISCONTINUED | OUTPATIENT
Start: 2023-07-27 | End: 2023-07-27 | Stop reason: HOSPADM

## 2023-07-27 RX ORDER — LIDOCAINE HYDROCHLORIDE 10 MG/ML
1 INJECTION, SOLUTION EPIDURAL; INFILTRATION; INTRACAUDAL; PERINEURAL ONCE
Status: DISCONTINUED | OUTPATIENT
Start: 2023-07-27 | End: 2023-07-27 | Stop reason: HOSPADM

## 2023-07-27 RX ORDER — SUCCINYLCHOLINE CHLORIDE 20 MG/ML
INJECTION INTRAMUSCULAR; INTRAVENOUS
Status: DISCONTINUED | OUTPATIENT
Start: 2023-07-27 | End: 2023-07-27

## 2023-07-27 RX ORDER — ESCITALOPRAM OXALATE 5 MG/1
15 TABLET ORAL DAILY
Status: DISCONTINUED | OUTPATIENT
Start: 2023-07-28 | End: 2023-07-28

## 2023-07-27 RX ORDER — LEVETIRACETAM 500 MG/1
500 TABLET ORAL 2 TIMES DAILY
Status: DISCONTINUED | OUTPATIENT
Start: 2023-07-27 | End: 2023-07-27

## 2023-07-27 RX ORDER — LIDOCAINE HYDROCHLORIDE 20 MG/ML
INJECTION INTRAVENOUS
Status: DISCONTINUED | OUTPATIENT
Start: 2023-07-27 | End: 2023-07-27

## 2023-07-27 RX ORDER — PROPOFOL 10 MG/ML
VIAL (ML) INTRAVENOUS
Status: DISCONTINUED | OUTPATIENT
Start: 2023-07-27 | End: 2023-07-27

## 2023-07-27 RX ORDER — MEMANTINE HYDROCHLORIDE 5 MG/1
10 TABLET ORAL 2 TIMES DAILY
Status: DISCONTINUED | OUTPATIENT
Start: 2023-07-27 | End: 2023-07-27

## 2023-07-27 RX ORDER — OXYCODONE HYDROCHLORIDE 10 MG/1
10 TABLET ORAL EVERY 4 HOURS PRN
Status: DISCONTINUED | OUTPATIENT
Start: 2023-07-27 | End: 2023-07-29 | Stop reason: HOSPADM

## 2023-07-27 RX ORDER — FENTANYL CITRATE 50 UG/ML
25 INJECTION, SOLUTION INTRAMUSCULAR; INTRAVENOUS
Status: DISCONTINUED | OUTPATIENT
Start: 2023-07-27 | End: 2023-07-27

## 2023-07-27 RX ORDER — OXYCODONE HYDROCHLORIDE 5 MG/1
5 TABLET ORAL EVERY 4 HOURS PRN
Status: DISCONTINUED | OUTPATIENT
Start: 2023-07-27 | End: 2023-07-29 | Stop reason: HOSPADM

## 2023-07-27 RX ORDER — ONDANSETRON 8 MG/1
8 TABLET, ORALLY DISINTEGRATING ORAL EVERY 8 HOURS PRN
Status: DISCONTINUED | OUTPATIENT
Start: 2023-07-27 | End: 2023-07-29 | Stop reason: HOSPADM

## 2023-07-27 RX ORDER — GADOBUTROL 604.72 MG/ML
9 INJECTION INTRAVENOUS
Status: COMPLETED | OUTPATIENT
Start: 2023-07-27 | End: 2023-07-27

## 2023-07-27 RX ORDER — LEVETIRACETAM 750 MG/1
750 TABLET ORAL 2 TIMES DAILY
Status: DISCONTINUED | OUTPATIENT
Start: 2023-07-27 | End: 2023-07-29 | Stop reason: HOSPADM

## 2023-07-27 RX ORDER — METOCLOPRAMIDE HYDROCHLORIDE 5 MG/ML
10 INJECTION INTRAMUSCULAR; INTRAVENOUS ONCE AS NEEDED
Status: COMPLETED | OUTPATIENT
Start: 2023-07-27 | End: 2023-07-27

## 2023-07-27 RX ORDER — MIDAZOLAM HYDROCHLORIDE 1 MG/ML
INJECTION, SOLUTION INTRAMUSCULAR; INTRAVENOUS
Status: DISCONTINUED | OUTPATIENT
Start: 2023-07-27 | End: 2023-07-27

## 2023-07-27 RX ORDER — LIDOCAINE HYDROCHLORIDE AND EPINEPHRINE 10; 10 MG/ML; UG/ML
INJECTION, SOLUTION INFILTRATION; PERINEURAL
Status: DISCONTINUED | OUTPATIENT
Start: 2023-07-27 | End: 2023-07-27 | Stop reason: HOSPADM

## 2023-07-27 RX ORDER — ONDANSETRON 2 MG/ML
INJECTION INTRAMUSCULAR; INTRAVENOUS
Status: DISCONTINUED | OUTPATIENT
Start: 2023-07-27 | End: 2023-07-27

## 2023-07-27 RX ORDER — KETAMINE HCL IN 0.9 % NACL 50 MG/5 ML
SYRINGE (ML) INTRAVENOUS
Status: DISCONTINUED | OUTPATIENT
Start: 2023-07-27 | End: 2023-07-27

## 2023-07-27 RX ORDER — ROCURONIUM BROMIDE 10 MG/ML
INJECTION, SOLUTION INTRAVENOUS
Status: DISCONTINUED | OUTPATIENT
Start: 2023-07-27 | End: 2023-07-27

## 2023-07-27 RX ORDER — DEXAMETHASONE SODIUM PHOSPHATE 4 MG/ML
INJECTION, SOLUTION INTRA-ARTICULAR; INTRALESIONAL; INTRAMUSCULAR; INTRAVENOUS; SOFT TISSUE
Status: DISCONTINUED | OUTPATIENT
Start: 2023-07-27 | End: 2023-07-27

## 2023-07-27 RX ORDER — POLYETHYLENE GLYCOL 3350 17 G/17G
17 POWDER, FOR SOLUTION ORAL DAILY PRN
Status: DISCONTINUED | OUTPATIENT
Start: 2023-07-27 | End: 2023-07-29 | Stop reason: HOSPADM

## 2023-07-27 RX ORDER — PHENYLEPHRINE HYDROCHLORIDE 10 MG/ML
INJECTION INTRAVENOUS
Status: DISCONTINUED | OUTPATIENT
Start: 2023-07-27 | End: 2023-07-27

## 2023-07-27 RX ORDER — LEVETIRACETAM 500 MG/5ML
INJECTION, SOLUTION, CONCENTRATE INTRAVENOUS
Status: DISCONTINUED | OUTPATIENT
Start: 2023-07-27 | End: 2023-07-27

## 2023-07-27 RX ORDER — SODIUM CHLORIDE, SODIUM LACTATE, POTASSIUM CHLORIDE, CALCIUM CHLORIDE 600; 310; 30; 20 MG/100ML; MG/100ML; MG/100ML; MG/100ML
INJECTION, SOLUTION INTRAVENOUS CONTINUOUS
Status: DISCONTINUED | OUTPATIENT
Start: 2023-07-27 | End: 2023-07-28

## 2023-07-27 RX ORDER — DEXAMETHASONE 4 MG/1
4 TABLET ORAL EVERY 6 HOURS
Status: DISCONTINUED | OUTPATIENT
Start: 2023-07-27 | End: 2023-07-28

## 2023-07-27 RX ORDER — FENTANYL CITRATE 50 UG/ML
INJECTION, SOLUTION INTRAMUSCULAR; INTRAVENOUS
Status: DISCONTINUED | OUTPATIENT
Start: 2023-07-27 | End: 2023-07-27

## 2023-07-27 RX ORDER — PROPOFOL 10 MG/ML
VIAL (ML) INTRAVENOUS CONTINUOUS PRN
Status: DISCONTINUED | OUTPATIENT
Start: 2023-07-27 | End: 2023-07-27

## 2023-07-27 RX ORDER — LABETALOL HCL 20 MG/4 ML
10 SYRINGE (ML) INTRAVENOUS
Status: DISCONTINUED | OUTPATIENT
Start: 2023-07-27 | End: 2023-07-29 | Stop reason: HOSPADM

## 2023-07-27 RX ORDER — MAGNESIUM SULFATE HEPTAHYDRATE 40 MG/ML
2 INJECTION, SOLUTION INTRAVENOUS ONCE AS NEEDED
Status: COMPLETED | OUTPATIENT
Start: 2023-07-27 | End: 2023-07-27

## 2023-07-27 RX ORDER — ACETAMINOPHEN 325 MG/1
650 TABLET ORAL EVERY 6 HOURS PRN
Status: DISCONTINUED | OUTPATIENT
Start: 2023-07-27 | End: 2023-07-29 | Stop reason: HOSPADM

## 2023-07-27 RX ADMIN — LEVETIRACETAM 750 MG: 750 TABLET, FILM COATED ORAL at 09:07

## 2023-07-27 RX ADMIN — SUCCINYLCHOLINE CHLORIDE 120 MG: 20 INJECTION, SOLUTION INTRAMUSCULAR; INTRAVENOUS at 07:07

## 2023-07-27 RX ADMIN — GADOBUTROL 9 ML: 604.72 INJECTION INTRAVENOUS at 02:07

## 2023-07-27 RX ADMIN — PHENYLEPHRINE HYDROCHLORIDE 100 MCG: 10 INJECTION INTRAVENOUS at 07:07

## 2023-07-27 RX ADMIN — CEFAZOLIN 2 G: 2 INJECTION, POWDER, FOR SOLUTION INTRAMUSCULAR; INTRAVENOUS at 12:07

## 2023-07-27 RX ADMIN — SODIUM CHLORIDE 0.25 MCG/KG/MIN: 9 INJECTION, SOLUTION INTRAVENOUS at 07:07

## 2023-07-27 RX ADMIN — SODIUM CHLORIDE, SODIUM GLUCONATE, SODIUM ACETATE, POTASSIUM CHLORIDE, MAGNESIUM CHLORIDE, SODIUM PHOSPHATE, DIBASIC, AND POTASSIUM PHOSPHATE: .53; .5; .37; .037; .03; .012; .00082 INJECTION, SOLUTION INTRAVENOUS at 07:07

## 2023-07-27 RX ADMIN — OXYCODONE HYDROCHLORIDE 5 MG: 5 TABLET ORAL at 04:07

## 2023-07-27 RX ADMIN — ONDANSETRON 4 MG: 2 INJECTION INTRAMUSCULAR; INTRAVENOUS at 10:07

## 2023-07-27 RX ADMIN — CEFTRIAXONE 2 G: 1 INJECTION, POWDER, FOR SOLUTION INTRAMUSCULAR; INTRAVENOUS at 07:07

## 2023-07-27 RX ADMIN — METOCLOPRAMIDE 10 MG: 5 INJECTION, SOLUTION INTRAMUSCULAR; INTRAVENOUS at 06:07

## 2023-07-27 RX ADMIN — MIDAZOLAM HYDROCHLORIDE 2 MG: 1 INJECTION, SOLUTION INTRAMUSCULAR; INTRAVENOUS at 07:07

## 2023-07-27 RX ADMIN — Medication 10 MG: at 10:07

## 2023-07-27 RX ADMIN — EPHEDRINE SULFATE 10 MG: 50 INJECTION INTRAVENOUS at 07:07

## 2023-07-27 RX ADMIN — SODIUM CHLORIDE, POTASSIUM CHLORIDE, SODIUM LACTATE AND CALCIUM CHLORIDE: 600; 310; 30; 20 INJECTION, SOLUTION INTRAVENOUS at 11:07

## 2023-07-27 RX ADMIN — LIDOCAINE HYDROCHLORIDE 50 MG: 20 INJECTION INTRAVENOUS at 07:07

## 2023-07-27 RX ADMIN — LEVETIRACETAM 500 MG: 500 TABLET, FILM COATED ORAL at 12:07

## 2023-07-27 RX ADMIN — SODIUM CHLORIDE, SODIUM GLUCONATE, SODIUM ACETATE, POTASSIUM CHLORIDE, MAGNESIUM CHLORIDE, SODIUM PHOSPHATE, DIBASIC, AND POTASSIUM PHOSPHATE: .53; .5; .37; .037; .03; .012; .00082 INJECTION, SOLUTION INTRAVENOUS at 08:07

## 2023-07-27 RX ADMIN — OXYCODONE HYDROCHLORIDE 10 MG: 10 TABLET ORAL at 09:07

## 2023-07-27 RX ADMIN — ACETAMINOPHEN 650 MG: 325 TABLET ORAL at 12:07

## 2023-07-27 RX ADMIN — LEVETIRACETAM 1000 MG: 100 INJECTION, SOLUTION INTRAVENOUS at 07:07

## 2023-07-27 RX ADMIN — PROPOFOL 200 MG: 10 INJECTION, EMULSION INTRAVENOUS at 07:07

## 2023-07-27 RX ADMIN — Medication 150 MCG/KG/MIN: at 07:07

## 2023-07-27 RX ADMIN — DEXAMETHASONE 4 MG: 4 TABLET ORAL at 12:07

## 2023-07-27 RX ADMIN — SODIUM CHLORIDE: 0.9 INJECTION, SOLUTION INTRAVENOUS at 07:07

## 2023-07-27 RX ADMIN — FENTANYL CITRATE 100 MCG: 50 INJECTION, SOLUTION INTRAMUSCULAR; INTRAVENOUS at 07:07

## 2023-07-27 RX ADMIN — DEXAMETHASONE SODIUM PHOSPHATE 12 MG: 4 INJECTION, SOLUTION INTRAMUSCULAR; INTRAVENOUS at 07:07

## 2023-07-27 RX ADMIN — Medication 20 MG: at 07:07

## 2023-07-27 RX ADMIN — DEXAMETHASONE 4 MG: 4 TABLET ORAL at 06:07

## 2023-07-27 RX ADMIN — SODIUM CHLORIDE 0.2 MCG/KG/MIN: 9 INJECTION, SOLUTION INTRAVENOUS at 07:07

## 2023-07-27 RX ADMIN — ROCURONIUM BROMIDE 5 MG: 10 INJECTION INTRAVENOUS at 07:07

## 2023-07-27 RX ADMIN — FENTANYL CITRATE 25 MCG: 50 INJECTION INTRAMUSCULAR; INTRAVENOUS at 11:07

## 2023-07-27 RX ADMIN — MAGNESIUM SULFATE 2 G: 2 INJECTION INTRAVENOUS at 06:07

## 2023-07-27 NOTE — ANESTHESIA PROCEDURE NOTES
Arterial    Diagnosis: oligodendroglioma  Doctor requesting consult: Colton    Patient location during procedure: done in OR  Procedure start time: 7/27/2023 7:30 AM  Timeout: 7/27/2023 7:30 AM  Procedure end time: 7/27/2023 7:30 AM    Staffing  Authorizing Provider: Eron Pierce MD  Performing Provider: Eron Pierce MD    Anesthesiologist was present at the time of the procedure.    Preanesthetic Checklist  Completed: patient identified, IV checked, site marked, risks and benefits discussed, surgical consent, monitors and equipment checked, pre-op evaluation, timeout performed and anesthesia consent givenArterial  Skin Prep: chlorhexidine gluconate  Orientation: right  Location: radial    Catheter Size: 20 G  Catheter placement by Anatomical landmarks. Heme positive aspiration all ports. Insertion Attempts: 1  Assessment  Dressing: secured with tape and tegaderm  Patient: Tolerated well

## 2023-07-27 NOTE — ASSESSMENT & PLAN NOTE
-Oligodendroglioma, WHO Class 2  -POD 0 s/p tumor debulking aguilar-craniotomy by NSGY  -Specimen sent to pathology  -further oncological treatment pending recovery    -Admitted to [facility]  -SBP <140  -Keppra 500 BID  -Decadron 4mg Q6  -Ancef  -Continuing Memantine for prevention of neurocognitive toxicity of whole brain irradiation  -q1 neuro checks  -Trending q1 vitals, CBC, CMP  -PRN pain control  -NSGY following

## 2023-07-27 NOTE — NURSING
Pt returned to room from MRI accompanied per nurse via bed without noted distress.Family @ bedside.

## 2023-07-27 NOTE — HPI
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 He reports that his symptoms began with occasional migraines involving waxing and waning L foot numbness. On evaluation of his headaches, CTH revealed a non-enhancing L parietotemporal tumor involving the corpus collosum. The pt underwent biopsy on 6/13/2023 (Michele Schuler) with pathology consistent with oligodendroglioma, WHO grade 2. He presented today for tumor debulking prior to further oncological treatment.     Patient was admitted to [facility] for post-op management in stable condition. Procedure was uncomplicated.

## 2023-07-27 NOTE — TRANSFER OF CARE
"Anesthesia Transfer of Care Note    Patient: Carlos Jacobson    Procedure(s) Performed: Procedure(s) (LRB):  CRANIOTOMY (Left)    Patient location: ICU    Anesthesia Type: general    Transport from OR: Transported from OR on 6-10 L/min O2 by face mask with adequate spontaneous ventilation. Continuous SpO2 monitoring in transport. Continuous ECG monitoring in transport. Continuos invasive BP monitoring in transport    Post pain: adequate analgesia    Post assessment: no apparent anesthetic complications and tolerated procedure well    Post vital signs: stable    Level of consciousness: awake    Nausea/Vomiting: no nausea/vomiting    Complications: none    Transfer of care protocol was followed      Last vitals:   Visit Vitals  BP (!) 108/55   Pulse 98   Temp 36.8 °C (98.2 °F) (Oral)   Resp (!) 22   Ht 6' 1" (1.854 m)   Wt 81.6 kg (180 lb)   SpO2 95%   BMI 23.75 kg/m²     "

## 2023-07-27 NOTE — PLAN OF CARE
[facility] Care Plan    POC reviewed with Carlos Jacobson and family at 1400. Pt verbalized understanding. Questions and concerns addressed. No acute events today. Pt progressing toward goals. Will continue to monitor. See below and flowsheets for full assessment and VS info.             Is this a stroke patient? no    Neuro:  [place] Coma Scale  Best Eye Response: 3-->(E3) to speech  Best Motor Response: 6-->(M6) obeys commands  Best Verbal Response: 4-->(V4) confused  [place] Coma Scale Score: 13        24 hr Temp:  [98.2 °F (36.8 °C)-98.5 °F (36.9 °C)]     CV:   Rhythm: normal sinus rhythm  BP goals:   SBP < 160  MAP > 65    Resp:           Plan: N/A    GI/:     Diet/Nutrition Received: NPO  Last Bowel Movement: 07/26/23       Intake/Output Summary (Last 24 hours) at 7/27/2023 1521  Last data filed at 7/27/2023 1246  Gross per 24 hour   Intake 2474.27 ml   Output 2275 ml   Net 199.27 ml          Labs/Accuchecks:  Recent Labs   Lab 07/27/23  0550   WBC 6.26   RBC 4.99   HGB 15.4   HCT 44.9         Recent Labs   Lab 07/27/23  0550      K 4.0   CO2 25      BUN 11   CREATININE 0.8   ALKPHOS 60   ALT 19   AST 25   BILITOT 0.5      Recent Labs   Lab 07/27/23  0550   INR 1.0   APTT 26.4    No results for input(s): CPK, CPKMB, TROPONINI, MB in the last 168 hours.    Electrolytes: N/A - electrolytes WDL  Accuchecks: none    Gtts:   lactated ringers 50 mL/hr at 07/27/23 1147       LDA/Wounds:  Lines/Drains/Airways       Drain  Duration                  Urethral Catheter 07/27/23 0725 Non-latex;Straight-tip;Silicone 16 Fr. <1 day              Arterial Line  Duration             Arterial Line 07/27/23 0730 Right Radial <1 day              Peripheral Intravenous Line  Duration                  Peripheral IV - Single Lumen 16 G Right Hand -- days         Peripheral IV - Single Lumen 07/27/23 0550 20 G Anterior;Left;Proximal Forearm <1 day                  Wounds: No  Wound care consulted: No

## 2023-07-27 NOTE — ANESTHESIA PREPROCEDURE EVALUATION
07/27/2023  Experiencing acute headaches, left foot paresthesia, and tunnel vision, Carlos Jacobson is a 42 y.o., male with large intra-axial mass (reported WHO grade 2 oligodendroglioma), [facility]esthesia Visit, Preop Instruction and Optimization with Dr. Mendez.      Pre-op Assessment    I have reviewed the Patient Summary Reports.     I have reviewed the Nursing Notes. I have reviewed the NPO Status.   I have reviewed the Medications.     Review of Systems  Anesthesia Hx:  History of prior surgery of interest to airway management or planning: Previous anesthesia: General 6/13/2023 BIOPSY, BRAIN STEREOATACTIC-LEFT PARIETAL BX with general anesthesia.  Airway issues documented on chart review include easy direct laryngoscopy   Denies Personal Hx of Anesthesia complications.   Social:  Non-Smoker, No Alcohol Use    Hematology/Oncology:  Hematology Normal      Current/Recent Cancer. Oncology Comments: Brain tumor  Oligodendroglioma determined by biopsy of brain    EENT/Dental:   chronic allergic rhinitis Eyes: LASIK SURGERY   Cardiovascular:   Exercise tolerance: poor Denies Hypertension.   Denies Angina. no hyperlipidemia    Pulmonary:   Denies Asthma.  Denies Shortness of breath.  Denies Recent URI.  Denies Sleep Apnea.    Renal/:   Chronic Renal Disease  Kidney Function/Disease, Chronic Kidney Disease (CKD) Renal lesion    Hepatic/GI:   Denies PUD. Denies GERD. Denies Liver Disease.  Denies Hepatitis. 04/28/2014  Clostridium difficile colitis    1987  Bilateral inguinal hernia repair (Bilateral)   Neurological:   Headaches Oligodendroglioma determined by biopsy of brain Neuro Symptoms of paresthesia Tunnel visionSeizure Disorder , Most recent seizure occurred < 1 month ago , anticonvulsant levels are recent < 1 month. FOCAL SEIZURE 3 WKS AGO. STARTED ON KEPPRA AND NO FURTHER  SEIZURE ACTIVITY Brain Tumor Brain tumor  Oligodendroglioma determined by biopsy of brain   Endocrine:   Denies Diabetes. Denies Hypothyroidism.    Psych:   Psychiatric History anxiety          Physical Exam  General: Well nourished, Cooperative, Alert and Oriented    Airway:  Mouth Opening: Normal  Tongue: Normal  Neck ROM: Normal ROM    Dental:  Intact          Anesthesia Assessment: Preoperative EQUATION    Planned Procedure: Procedure(s) (LRB):  CRANIOTOMY (N/A)  Requested Anesthesia Type:General  Surgeon: Michele Schuler MD  Service: Neurosurgery  Known or anticipated Date of Surgery:7/27/2023    Surgeon notes: reviewed    Electronic QUestionnaire Assessment completed via nurse interview with patient.        Triage considerations:     The patient has no apparent active cardiac condition (No unstable coronary Syndrome such as severe unstable angina or recent [<1 month] myocardial infarction, decompensated CHF, severe valvular   disease or significant arrhythmia)    Previous anesthesia records:GETA and No problems  6/13/2023    BIOPSY, BRAIN, STEREOTACTIC - LEFT PARIETAL BRAIN BIOPSY (Left: Head)   Airway:  Mouth Opening: Normal  TM Distance: Normal  Tongue: Normal  Neck ROM: Normal ROM       Airway - Non-Surgical Placement Date: 06/13/23; Placement Time: 0720 (created via procedure documentation); Mask Ventilation: Easy; Intubated: Postinduction; Blade: Salas #3; Airway Device Size: 7.5; Placement Verified By: Capnometry; Complicating Factors: None; Intubation Findings: Bilateral breath sounds, Atraumatic/Condition of teeth unchanged; Securment: Lips; Removal Date: 06/13/23;  Removal Time: 0935     Last PCP note: 6-12 months ago , within [facility]   Subspecialty notes: Neurosurgery, Radiology/ONC, Urology    Other important co-morbidities:  BRAIN TUMOR- OLIGODENDROGLIOMA       Tests already available:  Available tests,  within 3 months , within [facility] .   7/6/2023 MRI BRAIN FUNCTIONAL W/O A PHYSICIAN,  6/28/2023 MRI BRAIN STEALTH W/O FIDUCIALS W CONTRAST, 6/14/2023 CT HEAD W/O CONTRAST, 6/14/2023 PHOS, MG,BMP, CBC, 6/7/2023 UA, PT/INR, PTT, EKG, 6/5/2023 MRI BRAIN W W/O CONTRAST, 5/24/2023 CMP          Instructions given. (See in Nurse's note)    Optimization:  Anesthesia Preop Clinic Assessment  Indicated    Medical Opinion Indicated         Plan:    Testing:  T&S   Pre-anesthesia  visit       Visit focus: concerns in complex and/or prolonged anesthesia     Consultation:IM Perioperative HospitalistOR NP     Patient  has previously scheduled Medical Appointment:NONE    Navigation: Tests Scheduled. TBD             Consults scheduled.TBD             Results will be tracked by Preop Clinic.  Bernadette Nicholson RN BSN    MEDICAL CLEARANCE  Patient is optimized     Patient/ care giver/ Family member was instructed to call and update me about any changes to health,  medication, office visits ,testing out side of the [facility] , hospitalizations between now and surgery       Kate Mendez MD  Internal Medicine  [facility]   Cell Phone- [phone]     COVID vaccination status -none      Anesthesia Plan  Type of Anesthesia, risks & benefits discussed:    Anesthesia Type: Gen ETT  Intra-op Monitoring Plan: Standard ASA Monitors and Art Line  Post Op Pain Control Plan: multimodal analgesia  Induction:  IV  Informed Consent: Informed consent signed with the Patient and all parties understand the risks and agree with anesthesia plan.  All questions answered.   ASA Score: 3  Day of Surgery Review of History & Physical: H&P Update referred to the surgeon/provider.    Ready For Surgery From Anesthesia Perspective.       .

## 2023-07-27 NOTE — H&P
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 He reports that his symptoms began with occasional migraines involving waxing and waning L foot numbness. On evaluation of his headaches, CTH revealed a non-enhancing L parietotemporal tumor involving the corpus collosum. The pt underwent biopsy on 6/13/2023 (Michele Schuler) with pathology consistent with oligodendroglioma, WHO grade 2. He presented today for tumor debulking prior to further oncological treatment.     Patient was admitted to [facility] for post-op management in stable condition. Procedure was uncomplicated.      Past Medical History:   Diagnosis Date    Allergy     Anxiety     Brain tumor     Cancer     Clostridium difficile colitis 04/28/2014    GERD (gastroesophageal reflux disease)     Renal lesion      Past Surgical History:   Procedure Laterality Date    BILATERAL INGUINAL HERNIA REPAIR Bilateral 1987    COLONOSCOPY  2014    STEREOTACTIC BIOPSY OF BRAIN Left 6/13/2023    Procedure: BIOPSY, BRAIN, STEREOTACTIC - LEFT PARIETAL BRAIN BIOPSY;  Surgeon: Michele Schuler MD;  Location: [facility];  Service: Neurosurgery;  Laterality: Left;      No current facility-administered medications on file prior to encounter.     Current Outpatient Medications on File Prior to Encounter   Medication Sig Dispense Refill    acetaminophen (TYLENOL) 325 MG tablet Take 2 tablets (650 mg total) by mouth every 4 (four) hours as needed for Pain. (Patient taking differently: Take 650 mg by mouth daily as needed for Pain.) 20 tablet 0    ascorbic acid, vitamin C, (VITAMIN C) 1000 MG tablet Take 1,000 mg by mouth once daily.      EScitalopram oxalate (LEXAPRO) 10  MG tablet Take 1.5 tablets (15 mg total) by mouth once daily. 135 tablet 4    levETIRAcetam (KEPPRA) 750 MG Tab Take 1 tablet (750 mg total) by mouth 2 (two) times daily. 180 tablet 3    memantine (NAMENDA TITRATION PACK) tablet pack Take by mouth As instructed (Follow package directions). Follow package directions. 1 packet 0    memantine (NAMENDA) 10 MG Tab Take 1 tablet (10 mg total) by mouth 2 (two) times daily. 60 tablet 4    multivitamin (THERAGRAN) per tablet Take 1 tablet by mouth once daily.      oxyCODONE-acetaminophen (PERCOCET) 7.5-325 mg per tablet Take 1 tablet by mouth every 4 (four) hours as needed for Pain (Pain unrelieved by tylenol). (Patient not taking: Reported on 6/30/2023) 10 tablet 0      Allergies: Patient has no known allergies.    Family History   Problem Relation Age of Onset    Hyperlipidemia Mother     Hyperlipidemia Father     No Known Problems Sister     Hyperlipidemia Brother     Hyperlipidemia Maternal Uncle     Diabetes Maternal Grandmother     Lung cancer Maternal Grandmother      Social History     Tobacco Use    Smoking status: Some Days     Types: Cigars    Smokeless tobacco: Never    Tobacco comments:     Cigar once a year   Substance Use Topics    Alcohol use: No     Comment: occasionally    Drug use: No     Review of Systems   Constitutional:  Positive for fatigue. Negative for activity change and appetite change.   HENT:  Negative for sore throat.    Eyes:  Negative for visual disturbance.   Respiratory:  Negative for cough, chest tightness and shortness of breath.    Cardiovascular:  Negative for chest pain and palpitations.   Gastrointestinal:  Negative for abdominal distention, abdominal pain, diarrhea, nausea and vomiting.   Neurological:  Positive for numbness and headaches. Negative for dizziness, tremors, seizures and speech difficulty.   Objective:     Vitals:    Temp: 98.5 °F (36.9 °C)  Pulse: 78  Rhythm: normal sinus rhythm  BP: 119/73  MAP (mmHg): 91  Resp: (!)  24  SpO2: 97 %    Temp  Min: 98.2 °F (36.8 °C)  Max: 98.5 °F (36.9 °C)  Pulse  Min: 75  Max: 98  BP  Min: 108/55  Max: 131/83  MAP (mmHg)  Min: 72  Max: 102  Resp  Min: 17  Max: 24  SpO2  Min: 95 %  Max: 98 %    No intake/output data recorded.            Physical Exam  Constitutional:       General: He is not in acute distress.     Appearance: Normal appearance. He is normal weight. He is not ill-appearing or toxic-appearing.   HENT:      Head:      Comments: Incision site overlying left parietal region c/d/I with no hematoma.     Mouth/Throat:      Mouth: Mucous membranes are moist.      Pharynx: Oropharynx is clear.   Eyes:      General: No scleral icterus.     Extraocular Movements: Extraocular movements intact.      Conjunctiva/sclera: Conjunctivae normal.      Pupils: Pupils are equal, round, and reactive to light.   Cardiovascular:      Rate and Rhythm: Normal rate and regular rhythm.      Pulses: Normal pulses.      Heart sounds: Normal heart sounds.   Pulmonary:      Effort: Pulmonary effort is normal. No respiratory distress.      Breath sounds: Normal breath sounds.   Abdominal:      General: Abdomen is flat. Bowel sounds are normal. There is no distension.      Palpations: Abdomen is soft.      Tenderness: There is no abdominal tenderness.   Skin:     General: Skin is warm and dry.   Neurological:      Mental Status: He is alert.      Comments: A&Ox4. Speech is fluent, no dysarthria.  PERRL. EOMI.  No deficits in CN on gross exam  5/5 strength throughout  Sensation to light touch preserved throughout all extremities.  Gait untested.          Today I personally reviewed pertinent medications, lines/drains/airways, imaging, laboratory results, notably:      Assessment/Plan:     Psychiatric  Depression  Current Home Meds: Escitalopram 15 mg QD  -Continuing home medication    Oncology  * Brain tumor  -Oligodendroglioma, WHO Class 2  -POD 0 s/p tumor debulking aguilar-craniotomy by NSGY  -Specimen sent to  pathology  -further oncological treatment pending recovery    -Admitted to [facility]  -SBP <140  -Continuing home dose of Keppra 750 BID  -Decadron 4mg Q6  -Ancef  -Holding home memantine, as patient reports not taking  -q1 neuro checks  -Trending q1 vitals, CBC, CMP  -PRN pain control  -NSGY following            The patient is being Prophylaxed for:  Venous Thromboembolism with: None  Stress Ulcer with: PPI  Ventilator Pneumonia with: not applicable    Activity Orders            Diet Adult Regular (IDDSI Level 7): Regular starting at 07/27 1544    Turn patient starting at 07/27 1400          Full Code    Joshua Gonzalez MD  Neurocritical Care  [facility]

## 2023-07-27 NOTE — SUBJECTIVE & OBJECTIVE
Past Medical History:   Diagnosis Date    Allergy     Anxiety     Brain tumor     Cancer     Clostridium difficile colitis 04/28/2014    GERD (gastroesophageal reflux disease)     Renal lesion      Past Surgical History:   Procedure Laterality Date    BILATERAL INGUINAL HERNIA REPAIR Bilateral 1987    COLONOSCOPY  2014    STEREOTACTIC BIOPSY OF BRAIN Left 6/13/2023    Procedure: BIOPSY, BRAIN, STEREOTACTIC - LEFT PARIETAL BRAIN BIOPSY;  Surgeon: Michele Schuler MD;  Location: [facility];  Service: Neurosurgery;  Laterality: Left;      No current facility-administered medications on file prior to encounter.     Current Outpatient Medications on File Prior to Encounter   Medication Sig Dispense Refill    acetaminophen (TYLENOL) 325 MG tablet Take 2 tablets (650 mg total) by mouth every 4 (four) hours as needed for Pain. (Patient taking differently: Take 650 mg by mouth daily as needed for Pain.) 20 tablet 0    ascorbic acid, vitamin C, (VITAMIN C) 1000 MG tablet Take 1,000 mg by mouth once daily.      EScitalopram oxalate (LEXAPRO) 10 MG tablet Take 1.5 tablets (15 mg total) by mouth once daily. 135 tablet 4    levETIRAcetam (KEPPRA) 750 MG Tab Take 1 tablet (750 mg total) by mouth 2 (two) times daily. 180 tablet 3    memantine (NAMENDA TITRATION PACK) tablet pack Take by mouth As instructed (Follow package directions). Follow package directions. 1 packet 0    memantine (NAMENDA) 10 MG Tab Take 1 tablet (10 mg total) by mouth 2 (two) times daily. 60 tablet 4    multivitamin (THERAGRAN) per tablet Take 1 tablet by mouth once daily.      oxyCODONE-acetaminophen (PERCOCET) 7.5-325 mg per tablet Take 1 tablet by mouth every 4 (four) hours as needed for Pain (Pain unrelieved by tylenol). (Patient not taking: Reported on 6/30/2023) 10 tablet 0      Allergies: Patient has no known allergies.    Family History   Problem Relation Age of Onset    Hyperlipidemia Mother     Hyperlipidemia Father     No Known Problems Sister      Hyperlipidemia Brother     Hyperlipidemia Maternal Uncle     Diabetes Maternal Grandmother     Lung cancer Maternal Grandmother      Social History     Tobacco Use    Smoking status: Some Days     Types: Cigars    Smokeless tobacco: Never    Tobacco comments:     Cigar once a year   Substance Use Topics    Alcohol use: No     Comment: occasionally    Drug use: No     Review of Systems   Constitutional:  Positive for fatigue. Negative for activity change and appetite change.   HENT:  Negative for sore throat.    Eyes:  Negative for visual disturbance.   Respiratory:  Negative for cough, chest tightness and shortness of breath.    Cardiovascular:  Negative for chest pain and palpitations.   Gastrointestinal:  Negative for abdominal distention, abdominal pain, diarrhea, nausea and vomiting.   Neurological:  Positive for numbness and headaches. Negative for dizziness, tremors, seizures and speech difficulty.   Objective:     Vitals:    Temp: 98.5 °F (36.9 °C)  Pulse: 78  Rhythm: normal sinus rhythm  BP: 119/73  MAP (mmHg): 91  Resp: (!) 24  SpO2: 97 %    Temp  Min: 98.2 °F (36.8 °C)  Max: 98.5 °F (36.9 °C)  Pulse  Min: 75  Max: 98  BP  Min: 108/55  Max: 131/83  MAP (mmHg)  Min: 72  Max: 102  Resp  Min: 17  Max: 24  SpO2  Min: 95 %  Max: 98 %    No intake/output data recorded.            Physical Exam  Constitutional:       General: He is not in acute distress.     Appearance: Normal appearance. He is normal weight. He is not ill-appearing or toxic-appearing.   HENT:      Head:      Comments: Incision site overlying left parietal region c/d/I with no hematoma.     Mouth/Throat:      Mouth: Mucous membranes are moist.      Pharynx: Oropharynx is clear.   Eyes:      General: No scleral icterus.     Extraocular Movements: Extraocular movements intact.      Conjunctiva/sclera: Conjunctivae normal.      Pupils: Pupils are equal, round, and reactive to light.   Cardiovascular:      Rate and Rhythm: Normal rate and regular  rhythm.      Pulses: Normal pulses.      Heart sounds: Normal heart sounds.   Pulmonary:      Effort: Pulmonary effort is normal. No respiratory distress.      Breath sounds: Normal breath sounds.   Abdominal:      General: Abdomen is flat. Bowel sounds are normal. There is no distension.      Palpations: Abdomen is soft.      Tenderness: There is no abdominal tenderness.   Skin:     General: Skin is warm and dry.   Neurological:      Mental Status: He is alert.      Comments: A&Ox4. Speech is fluent, no dysarthria.  PERRL. EOMI.  No deficits in CN on gross exam  5/5 strength throughout  Sensation to light touch preserved throughout all extremities.  Gait untested.          Today I personally reviewed pertinent medications, lines/drains/airways, imaging, laboratory results, notably:

## 2023-07-27 NOTE — ANESTHESIA PROCEDURE NOTES
Intubation    Date/Time: 7/27/2023 7:21 AM  Performed by: Munira Blue CRNA  Authorized by: Eron Pierce MD     Intubation:     Induction:  Intravenous    Intubated:  Postinduction    Mask Ventilation:  Easy mask    Attempts:  1    Attempted By:  CRNA    Method of Intubation:  Direct    Blade:  Kruger 2    Laryngeal View Grade: Grade I - full view of cords      Difficult Airway Encountered?: No      Complications:  None    Airway Device:  Oral endotracheal tube    Airway Device Size:  8.0    Style/Cuff Inflation:  Cuffed    Inflation Amount (mL):  5    Tube secured:  22    Secured at:  The teeth    Placement Verified By:  Capnometry    Complicating Factors:  None    Findings Post-Intubation:  BS equal bilateral and atraumatic/condition of teeth unchanged

## 2023-07-27 NOTE — CARE UPDATE
Patient arrived to [facility] from OR by ICU Bed    Report received from: Munira DE LA FUENTE    Type of stroke/diagnosis: Tumor resection    Current symptoms: lethargic d/t anesthesia    Skin Assessment done: Y  Wounds noted:  *If wounds noted, was Wound Care consulted? N  *If wounds noted, LDA placed? N  Skin Assessment Verified by: JERICA Lincoln Completed? N - still lethargic from anesthesia    Patient Belongings on Admit: none    NCC notified: MD Yomaira

## 2023-07-27 NOTE — PLAN OF CARE
[facility]  Initial Discharge Assessment       Primary Care Provider: Lionel Javed MD    Admission Diagnosis: Brain tumor [D49.6]    Admission Date: 7/27/2023  Expected Discharge Date:     Transition of Care Barriers: None    Payor: [employer] / Plan: [employer] SELECT PLUS TIERED / Product Type: Commercial /     Extended Emergency Contact Information  Primary Emergency Contact: Nati Jacobson  Address: [address]           [address]  Home Phone: [phone]  Mobile Phone: [phone]  Relation: Mother    Discharge Plan A: Home with family  Discharge Plan B: Home with family, Home Health      Radha [facility] [address]  [address] [facility] [address]  Phone: [phone] Fax: [phone]      Initial Assessment (most recent)       Adult Discharge Assessment - 07/27/23 1335          Discharge Assessment    Assessment Type Discharge Planning Assessment     Confirmed/corrected address, phone number and insurance Yes     Confirmed Demographics Correct on Facesheet     Source of Information family   MotherIndira Wyatt [phone]    If unable to respond/provide information was family/caregiver contacted? Yes   Imani Wyatt [phone]    Contact Name/Number Imani Wyatt [phone]     Communicated ALEAH with patient/caregiver Date not available/Unable to determine     Reason For Admission Brain tumor     People in Home parent(s)   Imani Wyatt [phone]    Do you expect to return to your current living situation? Yes     Do you have help at home or someone to help you manage your care at home? Yes     Who are your caregiver(s) and their phone number(s)? Imani Wyatt [phone]     Prior to hospitilization cognitive status: Alert/Oriented     Current cognitive status: Alert/Oriented     Walking or Climbing Stairs --   none    Dressing/Bathing --   None    Do you have  any problems with: --   Imani Wyatt [phone]    Home Accessibility stairs to enter home     Number of Stairs, Main Entrance none     Surface of Stairs, Main Entrance hardwood     Stair Railings, Main Entrance none     Landing, Stairs, Main Entrance no railings     Stairs Comment, Main Entrance none     Home Layout Able to live on 1st floor     Equipment Currently Used at Home none     Readmission within 30 days? No     Patient currently being followed by outpatient case management? No     Do you currently have service(s) that help you manage your care at home? No     Do you take prescription medications? Yes     Do you have prescription coverage? Yes     Coverage [employer] - [employer] SELECT PLUS TIERED     Do you have any problems affording any of your prescribed medications? No     Is the patient taking medications as prescribed? yes     Who is going to help you get home at discharge? Imani Wyatt [phone]     How do you get to doctors appointments? family or friend will provide     Are you on dialysis? No     Do you take coumadin? No     Discharge Plan A Home with family     Discharge Plan B Home with family;Home Health     DME Needed Upon Discharge  other (see comments)   TBD    Discharge Plan discussed with: Parent(s)     Name(s) and Number(s) Imani Wyatt [phone]     Transition of Care Barriers None        Physical Activity    On average, how many days per week do you engage in moderate to strenuous exercise (like a brisk walk)? 3 days     On average, how many minutes do you engage in exercise at this level? 30 min        Financial Resource Strain    How hard is it for you to pay for the very basics like food, housing, medical care, and heating? Very hard        Housing Stability    In the last 12 months, was there a time when you were not able to pay the mortgage or rent on time? No     In the last 12 months, was there a time when you did not have a  steady place to sleep or slept in a shelter (including now)? No        Transportation Needs    In the past 12 months, has lack of transportation kept you from medical appointments or from getting medications? No     In the past 12 months, has lack of transportation kept you from meetings, work, or from getting things needed for daily living? No        Food Insecurity    Within the past 12 months, you worried that your food would run out before you got the money to buy more. Never true     Within the past 12 months, the food you bought just didn't last and you didn't have money to get more. Never true        Stress    Do you feel stress - tense, restless, nervous, or anxious, or unable to sleep at night because your mind is troubled all the time - these days? Patient refused        Social Connections    In a typical week, how many times do you talk on the phone with family, friends, or neighbors? Three times a week     How often do you attend Mosque or Hoahaoism services? More than 4 times per year     How often do you attend meetings of the clubs or organizations you belong to? Patient refused     Are you , , , , never , or living with a partner? Never         Alcohol Use    Q1: How often do you have a drink containing alcohol? Patient refused        OTHER    Name(s) of People in Home Imani Jacobson- [phone]                     SW spoke with patient's mother in 7296 for Discharge Planning Assessment. Per mother, Pt lives with family in a two-story family home on a slab foundation with zero steps to porch and point of entry. Patient was independent with ADLS and DID NOT use DME or in-home assistive equipment. Pt is not on dialysis  or coumadin,  takes medications as prescribed / keeps refilled / has resources for all daily and prescriptive needs. Preferred pharmacy is [facility] - Agreeable to bedside delivery. Will have help from Imani Jacobson-  [phone] and other immediate family upon discharge - Mother to provide transportation home.  All questions addressed. Unit and SW direct numbers provided. Will continue to follow for course of hospitalization    7/27/2023  4:50 AM  Brain tumor [D49.6]    PCP: Lionel Javed MD    PHARMACY:   [address] [address]  Phone: [phone] Fax: [phone]      Payor: [employer] / Plan: [employer] PLUS TIERED / Product Type: Commercial /       Ruth Ann Leary LMSW  PRN - [facility]  [id number]

## 2023-07-27 NOTE — ANESTHESIA POSTPROCEDURE EVALUATION
Anesthesia Post Evaluation    Patient: Carlos Jacobson    Procedure(s) Performed: Procedure(s) (LRB):  CRANIOTOMY (Left)    Final Anesthesia Type: general      Patient location during evaluation: NICU  Patient participation: Yes- Able to Participate  Level of consciousness: awake and alert  Post-procedure vital signs: reviewed and stable  Pain management: adequate  Airway patency: patent    PONV status at discharge: No PONV  Anesthetic complications: no      Cardiovascular status: blood pressure returned to baseline  Respiratory status: unassisted  Hydration status: euvolemic  Follow-up not needed.          Vitals Value Taken Time   /55 07/27/23 1127   Temp 36.8 °C (98.2 °F) 07/27/23 1127   Pulse 74 07/27/23 1357   Resp 17 07/27/23 1357   SpO2 97 % 07/27/23 1357   Vitals shown include unvalidated device data.      No case tracking events are documented in the log.      Pain/Ana Score: Pain Rating Prior to Med Admin: 4 (7/27/2023 12:51 PM)  Pain Rating Post Med Admin: 4 (7/27/2023 12:27 PM)

## 2023-07-28 LAB
ALBUMIN SERPL BCP-MCNC: 3.6 G/DL (ref 3.5–5.2)
ALP SERPL-CCNC: 56 U/L (ref 55–135)
ALT SERPL W/O P-5'-P-CCNC: 16 U/L (ref 10–44)
ANION GAP SERPL CALC-SCNC: 9 MMOL/L (ref 8–16)
APTT PPP: 25.1 SEC (ref 21–32)
AST SERPL-CCNC: 16 U/L (ref 10–40)
BASOPHILS # BLD AUTO: 0 K/UL (ref 0–0.2)
BASOPHILS NFR BLD: 0 % (ref 0–1.9)
BILIRUB SERPL-MCNC: 0.6 MG/DL (ref 0.1–1)
BUN SERPL-MCNC: 9 MG/DL (ref 6–20)
CALCIUM SERPL-MCNC: 9.1 MG/DL (ref 8.7–10.5)
CHLORIDE SERPL-SCNC: 107 MMOL/L (ref 95–110)
CO2 SERPL-SCNC: 23 MMOL/L (ref 23–29)
CREAT SERPL-MCNC: 0.7 MG/DL (ref 0.5–1.4)
DIFFERENTIAL METHOD: ABNORMAL
EOSINOPHIL # BLD AUTO: 0 K/UL (ref 0–0.5)
EOSINOPHIL NFR BLD: 0 % (ref 0–8)
ERYTHROCYTE [DISTWIDTH] IN BLOOD BY AUTOMATED COUNT: 12 % (ref 11.5–14.5)
EST. GFR  (NO RACE VARIABLE): >60 ML/MIN/1.73 M^2
GLUCOSE SERPL-MCNC: 137 MG/DL (ref 70–110)
HCT VFR BLD AUTO: 40.3 % (ref 40–54)
HGB BLD-MCNC: 14 G/DL (ref 14–18)
IMM GRANULOCYTES # BLD AUTO: 0.06 K/UL (ref 0–0.04)
IMM GRANULOCYTES NFR BLD AUTO: 0.5 % (ref 0–0.5)
INR PPP: 1 (ref 0.8–1.2)
LYMPHOCYTES # BLD AUTO: 0.8 K/UL (ref 1–4.8)
LYMPHOCYTES NFR BLD: 6.7 % (ref 18–48)
MAGNESIUM SERPL-MCNC: 2.3 MG/DL (ref 1.6–2.6)
MCH RBC QN AUTO: 30.5 PG (ref 27–31)
MCHC RBC AUTO-ENTMCNC: 34.7 G/DL (ref 32–36)
MCV RBC AUTO: 88 FL (ref 82–98)
MONOCYTES # BLD AUTO: 1 K/UL (ref 0.3–1)
MONOCYTES NFR BLD: 8 % (ref 4–15)
NEUTROPHILS # BLD AUTO: 10.3 K/UL (ref 1.8–7.7)
NEUTROPHILS NFR BLD: 84.8 % (ref 38–73)
NRBC BLD-RTO: 0 /100 WBC
PHOSPHATE SERPL-MCNC: 3 MG/DL (ref 2.7–4.5)
PLATELET # BLD AUTO: 241 K/UL (ref 150–450)
PMV BLD AUTO: 9.7 FL (ref 9.2–12.9)
POTASSIUM SERPL-SCNC: 4.1 MMOL/L (ref 3.5–5.1)
PROT SERPL-MCNC: 6.6 G/DL (ref 6–8.4)
PROTHROMBIN TIME: 11 SEC (ref 9–12.5)
RBC # BLD AUTO: 4.59 M/UL (ref 4.6–6.2)
SODIUM SERPL-SCNC: 139 MMOL/L (ref 136–145)
WBC # BLD AUTO: 12.19 K/UL (ref 3.9–12.7)

## 2023-07-28 PROCEDURE — 94761 N-INVAS EAR/PLS OXIMETRY MLT: CPT

## 2023-07-28 PROCEDURE — 97116 GAIT TRAINING THERAPY: CPT

## 2023-07-28 PROCEDURE — 63600175 PHARM REV CODE 636 W HCPCS: Performed by: STUDENT IN AN ORGANIZED HEALTH CARE EDUCATION/TRAINING PROGRAM

## 2023-07-28 PROCEDURE — 97530 THERAPEUTIC ACTIVITIES: CPT

## 2023-07-28 PROCEDURE — 85730 THROMBOPLASTIN TIME PARTIAL: CPT

## 2023-07-28 PROCEDURE — 99232 PR SUBSEQUENT HOSPITAL CARE,LEVL II: ICD-10-PCS | Mod: ,,, | Performed by: PSYCHIATRY & NEUROLOGY

## 2023-07-28 PROCEDURE — 99232 SBSQ HOSP IP/OBS MODERATE 35: CPT | Mod: ,,, | Performed by: PSYCHIATRY & NEUROLOGY

## 2023-07-28 PROCEDURE — 80053 COMPREHEN METABOLIC PANEL: CPT

## 2023-07-28 PROCEDURE — 25000003 PHARM REV CODE 250

## 2023-07-28 PROCEDURE — 85610 PROTHROMBIN TIME: CPT

## 2023-07-28 PROCEDURE — 92523 SPEECH SOUND LANG COMPREHEN: CPT

## 2023-07-28 PROCEDURE — 97166 OT EVAL MOD COMPLEX 45 MIN: CPT

## 2023-07-28 PROCEDURE — 20600001 HC STEP DOWN PRIVATE ROOM

## 2023-07-28 PROCEDURE — 92610 EVALUATE SWALLOWING FUNCTION: CPT

## 2023-07-28 PROCEDURE — 25000003 PHARM REV CODE 250: Performed by: NEUROLOGICAL SURGERY

## 2023-07-28 PROCEDURE — 25000003 PHARM REV CODE 250: Performed by: PHYSICIAN ASSISTANT

## 2023-07-28 PROCEDURE — 85025 COMPLETE CBC W/AUTO DIFF WBC: CPT

## 2023-07-28 PROCEDURE — 83735 ASSAY OF MAGNESIUM: CPT

## 2023-07-28 PROCEDURE — 97162 PT EVAL MOD COMPLEX 30 MIN: CPT

## 2023-07-28 PROCEDURE — 63600175 PHARM REV CODE 636 W HCPCS: Performed by: PHYSICIAN ASSISTANT

## 2023-07-28 PROCEDURE — 84100 ASSAY OF PHOSPHORUS: CPT

## 2023-07-28 RX ORDER — ESCITALOPRAM OXALATE 5 MG/5ML
15 SOLUTION ORAL DAILY
Status: DISCONTINUED | OUTPATIENT
Start: 2023-07-28 | End: 2023-07-29 | Stop reason: HOSPADM

## 2023-07-28 RX ORDER — DEXAMETHASONE 4 MG/1
4 TABLET ORAL EVERY 8 HOURS
Status: DISCONTINUED | OUTPATIENT
Start: 2023-07-28 | End: 2023-07-29 | Stop reason: HOSPADM

## 2023-07-28 RX ADMIN — DEXAMETHASONE 4 MG: 4 TABLET ORAL at 11:07

## 2023-07-28 RX ADMIN — CETIRIZINE HYDROCHLORIDE 5 MG: 5 TABLET, FILM COATED ORAL at 08:07

## 2023-07-28 RX ADMIN — DEXAMETHASONE 4 MG: 4 TABLET ORAL at 09:07

## 2023-07-28 RX ADMIN — ACETAMINOPHEN 650 MG: 325 TABLET ORAL at 04:07

## 2023-07-28 RX ADMIN — ACETAMINOPHEN 650 MG: 325 TABLET ORAL at 09:07

## 2023-07-28 RX ADMIN — CEFAZOLIN 2 G: 2 INJECTION, POWDER, FOR SOLUTION INTRAMUSCULAR; INTRAVENOUS at 12:07

## 2023-07-28 RX ADMIN — DEXAMETHASONE 4 MG: 4 TABLET ORAL at 06:07

## 2023-07-28 RX ADMIN — LEVETIRACETAM 750 MG: 750 TABLET, FILM COATED ORAL at 08:07

## 2023-07-28 RX ADMIN — ACETAMINOPHEN 650 MG: 325 TABLET ORAL at 08:07

## 2023-07-28 RX ADMIN — ESCITALOPRAM OXALATE 15 MG: 5 SOLUTION ORAL at 11:07

## 2023-07-28 RX ADMIN — LEVETIRACETAM 750 MG: 750 TABLET, FILM COATED ORAL at 09:07

## 2023-07-28 RX ADMIN — DEXAMETHASONE 4 MG: 4 TABLET ORAL at 12:07

## 2023-07-28 RX ADMIN — OXYCODONE HYDROCHLORIDE 10 MG: 10 TABLET ORAL at 03:07

## 2023-07-28 RX ADMIN — PANTOPRAZOLE SODIUM 40 MG: 40 TABLET, DELAYED RELEASE ORAL at 08:07

## 2023-07-28 NOTE — SUBJECTIVE & OBJECTIVE
Interval History:  See hospital course    Review of Systems   Constitutional:  Positive for fatigue. Negative for activity change and appetite change.   HENT:  Negative for sore throat.    Eyes:  Negative for visual disturbance.   Respiratory:  Negative for cough, chest tightness and shortness of breath.    Cardiovascular:  Negative for chest pain and palpitations.   Gastrointestinal:  Negative for abdominal distention, abdominal pain, diarrhea, nausea and vomiting.   Neurological:  Positive for numbness and headaches. Negative for dizziness, tremors, seizures and speech difficulty.     Objective:     Vitals:  Temp: 98.9 °F (37.2 °C)  Pulse: 78  Rhythm: normal sinus rhythm  BP: (!) 106/59  MAP (mmHg): 78  Resp: (!) 22  SpO2: 96 %    Temp  Min: 98.2 °F (36.8 °C)  Max: 98.9 °F (37.2 °C)  Pulse  Min: 65  Max: 85  BP  Min: 102/65  Max: 119/73  MAP (mmHg)  Min: 77  Max: 91  Resp  Min: 14  Max: 28  SpO2  Min: 95 %  Max: 97 %    07/27 0701 - 07/28 0700  In: 4054.6 [P.O.:826; I.V.:855.5]  Out: 4150 [Urine:4000]            Physical Exam  Constitutional:       General: He is not in acute distress.     Appearance: Normal appearance. He is normal weight. He is not ill-appearing or toxic-appearing.   HENT:      Head:      Comments: Incision site overlying left parietal region c/d/I with no hematoma.     Mouth/Throat:      Mouth: Mucous membranes are moist.      Pharynx: Oropharynx is clear.   Eyes:      General: No scleral icterus.     Extraocular Movements: Extraocular movements intact.      Conjunctiva/sclera: Conjunctivae normal.      Pupils: Pupils are equal, round, and reactive to light.   Cardiovascular:      Rate and Rhythm: Normal rate and regular rhythm.      Pulses: Normal pulses.      Heart sounds: Normal heart sounds.   Pulmonary:      Effort: Pulmonary effort is normal. No respiratory distress.      Breath sounds: Normal breath sounds.   Abdominal:      General: Abdomen is flat. Bowel sounds are normal. There is no  distension.      Palpations: Abdomen is soft.      Tenderness: There is no abdominal tenderness.   Skin:     General: Skin is warm and dry.   Neurological:      Mental Status: He is alert.      Comments: A&Ox4. Speech is fluent, no dysarthria.  PERRL. EOMI.  No deficits in CN on gross exam  5/5 strength throughout  Sensation to light touch preserved throughout all extremities.  Gait untested.          Medications:  Continuous Scheduledcetirizine, 5 mg, Daily  dexAMETHasone, 4 mg, Q6H  EScitalopram oxalate, 15 mg, Daily  levETIRAcetam, 750 mg, BID  pantoprazole, 40 mg, Daily    PRNacetaminophen, 650 mg, Q6H PRN  labetalol, 10 mg, Q15 Min PRN  ondansetron, 8 mg, Q8H PRN  ondansetron, 4 mg, Q8H PRN  oxyCODONE, 5 mg, Q4H PRN  oxyCODONE, 10 mg, Q4H PRN  polyethylene glycol, 17 g, Daily PRN  sodium chloride 0.9%, 10 mL, PRN      Today I personally reviewed pertinent medications, lines/drains/airways, imaging, laboratory results,    Diet  Diet Adult Regular (IDDSI Level 7)  Diet Adult Regular (IDDSI Level 7)

## 2023-07-28 NOTE — OP NOTE
DATE OF PROCEDURE:  7/27/2023     SURGEON:  Man Segura M.D., Ph.D.    CO-SURGEON: Michele Schuler M.D.      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      PROCEDURES PERFORMED:  1.  Minimally invasive left parietal craniotomy for tumor.  2.  Stealth navigation.  3.  Microsurgical technique.  4.  Preplanning of surgery with Synaptive diffusion tensor imaging.     INDICATIONS IN DETAIL:    Mr. Carlos Jacobson is a 42 y.o. gentleman who presents today for surgery. This is a patient who began experiencing acute headaches, left foot paresthesia, and tunnel vision. He presented to  in 5/2023 and had a CT head showing a left frontal lesion. MRI brain confirmed diffuse, non-enhancing tumor within the temporal and parietal lobes. The pt underwent biopsy on 6/13/2023 (Michele Schuler) with pathology consistent with oligodendroglioma, WHO grade 2. Upon further evaluation the pt denies any changes in peripheral vision. MRI Brain Functional (7/6/2023):  Omniscient protocol MR examination performed for purposes of procedure localization. Resting state fMRI and DTI to be post processed and analyzed separately by proprietary third party software. 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  I have recommended craniotomy for tumor resection. I have discussed the risks/benefits, indications, and alternatives for the proposed procedure in detail.  I have answered all of their questions and the pt would like some time to consider whether or not they would like to proceed with the surgery, pt will contact us when they have reached a final decision.      PROCEDURE IN DETAIL:    The patient was seen in  the pretreatment area and the risks, benefits and alternatives were again discussed and the patient wished to proceed. The patient was brought to the Operating Room and a general anesthetic was administered.  All proper lines were placed.  The patient was placed in the supine position with a large bolster underneath his left torso.  The head was placed in 3-point fixation using [place]eridge headholder.  The Stealth navigation system was brought to the field and an accurate registration was achieved using the tracer function.  The patient's MRI  consisted of a diffusion tensor imaging, in which we had demarcated the optic radiations and cortical spinal tract..  The area over our entry point was marked on the patient's scalp and a line of approximately 10 cm was drawn with this in the midline.  The patient's hair was clipped in this region and the patient's head was cleaned, prepped and draped in the usual fashion.  A lidocaine-bupivacaine mix was infiltrated under the skin.  An incision was made using a #10 blade and carried down to the calvarium using Bovie cautery.  Then, the soft tissue was detached from the skull using a periosteal elevator and held in retraction using a cerebellar retractor.  Two isaias holes were made, one isaias hole was made at the superior aspect of the exposure and the other at the inferior aspect of the exposure.  Once this was complete, using a high-speed [employer] drill with a craniotome attachment, we turned a 4 cm craniotomy flap.  Once this was complete, we verified our location again with stealth navigation and the dura was opened with the flap facing superiorly.    We made a corticectomy measuring .30 mm and  placed a  28  mm Vycor tube through the substance of the brain down to our target.  Under microscopic visualization we could see the edge of the tumor.  Using suction and bipolar cautery we began debulking tumor using microsurgical technique.  Once we debulked the center of the tumor, we  began working at the edge of tumor and normal tissue boundary posteriorly.  We did this around the tumor posteriorly until we reached the falx medially.  We continued in this fashion superiorly and inferiorly working our way anteriorly.  We were able to see the corpus callosum.  We then tilted the tube anteriorly to remove tumor in the deep frontal region.  We then angled the tube inferiorly to remove tumor going into the temporal region.  We removed as much of this as we could see using this technique.  The wound was irrigated copiously and all points were coagulated.  We removed more tumor in the parietal region.  We finally came back to the region tumor involving corpus callosum.  We attempted to ankle the tube across the midline to address tumor on the right side.  We were able to work up to falx.  However the veins in that area would not permit us to work across the region.  We made an attempt to cut the falx.  There was a large venous Lake that precluded further resection.  At this point we had made a very large debulking.  The brain was quite relaxed.  The wound was irrigated copiously.  Lined the resection cavity with Surgicel.  We glued in place with Vista Seal.  The retractor was slowly removed.  Once this was complete, the wound was irrigated copiously. The dura was closed using 4-0 Nurolon sutures in the usual fashion.  A dry piece of Gelfoam was placed over the dural closure.  The bone flap was replaced using Kin[place] plates and screws.  Once this was complete, the soft tissues  were closed in layers with staples in the skin.      A clean dressing was placed.  The patient was awakened by the Anesthesia staff.  At the point the patient was awake and following commands, he was extubated.     Specimen included tumor taken for permanent evaluation.     EBL was approximately 100 mL.    We monitored SSEP and MEPS during the entire surgery.  There was a decrease in the right upper SSEP during surgery.  This  improved once the retractor was removed.     There were no intraprocedural complications.     All counts were correct at the end of surgery.     Dr. Man Segura was present during the entire procedure.     Two attendings were required his of the size of the tumor and the difficulty in resec modifier.  ting this tumor.  This tumor warrants a -22 modifier.

## 2023-07-28 NOTE — PLAN OF CARE
Problem: Occupational Therapy  Goal: Occupational Therapy Goal  Description: Goals to be met by: 7/28/23     Patient will increase functional independence with ADLs by performing:    - Patient will be educated on compensatory strategies for visual impairment for improved functional ability.   - Grooming while standing at sink with Alcorn while utilizing compensatory strategies for visual impairment.      Outcome: Ongoing, Progressing

## 2023-07-28 NOTE — PROGRESS NOTES
[facility]  Neurosurgery  Progress Note    Subjective:     History of Present Illness: No notes on file    Post-Op Info:  Procedure(s) (LRB):  CRANIOTOMY (Left)   1 Day Post-Op     Interval History: 7/28: OR yesterday for L craniotomy for tumor resection. Tolerated procedure well. Mild right visual field cut/neglect this morning. Mri with expected residual, no major bleeding/strokes. OK to TTF today to NSGY .    Medications:  Continuous Infusions:  Scheduled Meds:   cetirizine  5 mg Oral Daily    dexAMETHasone  4 mg Oral Q6H    EScitalopram oxalate  15 mg Oral Daily    levETIRAcetam  750 mg Oral BID    pantoprazole  40 mg Oral Daily     PRN Meds:acetaminophen, labetalol, ondansetron, ondansetron, oxyCODONE, oxyCODONE, polyethylene glycol, sodium chloride 0.9%     Review of Systems  Objective:     Weight: 85 kg (187 lb 6.3 oz)  Body mass index is 24.72 kg/m².  Vital Signs (Most Recent):  Temp: 98.9 °F (37.2 °C) (07/28/23 1101)  Pulse: 78 (07/28/23 1201)  Resp: (!) 22 (07/28/23 1201)  BP: (!) 106/59 (07/28/23 1201)  SpO2: 96 % (07/28/23 1201) Vital Signs (24h Range):  Temp:  [98.2 °F (36.8 °C)-98.9 °F (37.2 °C)] 98.9 °F (37.2 °C)  Pulse:  [65-85] 78  Resp:  [14-28] 22  SpO2:  [95 %-97 %] 96 %  BP: (102-119)/(59-73) 106/59  Arterial Line BP: ()/(54-85) 123/59     Date 07/28/23 0700 - 07/29/23 0659   Shift 2131-0755 9364-5092 3245-5765 24 Hour Total   INTAKE   I.V.(mL/kg) 161.9(1.9)   161.9(1.9)   Shift Total(mL/kg) 161.9(1.9)   161.9(1.9)   OUTPUT   Urine(mL/kg/hr) 900   900   Shift Total(mL/kg) 900(10.6)   900(10.6)   Weight (kg) 85 85 85 85                               Physical Exam         Neurosurgery Physical Exam    Physical Exam:    Constitutional: No distress.     HEENT: atraumatic/normocephalic    Cardiovascular: Regular rhythm.     Pulm: aerating well, saturating well    Abdominal: Soft.     Psych/Behavior: He is alert.       E4V5M6  AOx3  PERRL  EOMI  Mild right visual field  cut/neglect  Face Symmetric  Tongue midline  BUE 5/5  BLE 5/5  No drift      Significant Labs:  Recent Labs   Lab 07/27/23  0550 07/28/23  0041   GLU 88 137*    139   K 4.0 4.1    107   CO2 25 23   BUN 11 9   CREATININE 0.8 0.7   CALCIUM 9.8 9.1   MG  --  2.3     Recent Labs   Lab 07/27/23  0550 07/28/23  0814   WBC 6.26 12.19   HGB 15.4 14.0   HCT 44.9 40.3    241     Recent Labs   Lab 07/27/23  0550 07/28/23  0041   INR 1.0 1.0   APTT 26.4 25.1     Microbiology Results (last 7 days)       ** No results found for the last 168 hours. **          All pertinent labs from the last 24 hours have been reviewed.    Significant Diagnostics:  I have reviewed and interpreted all pertinent imaging results/findings within the past 24 hours.    Assessment/Plan:     Oligodendroglioma determined by biopsy of brain  42 M presents for elective resection of oligodendroglioma on 7/27/23    MRI post op reviewed with expected non enhancing residual in right hemisphere and at superior aspect of tumor. No significant hemorrhage/stroke    -- ICU postop, OK to TTF to NSGY today   -- q4 hour neurochecks  -- remove a line  -- SBP < 160  -- Dex 4q6 wean over 2 weeks  -- Keppra 500 BID  -- OK for diet  -- OK for SQH 24 hours after procedure.   -- PT/OT OOB today         Tosha Hendrickson MD  Neurosurgery  [facility]

## 2023-07-28 NOTE — PLAN OF CARE
[facility] Care Plan    POC reviewed with Carlos A Kavon and family at 0300. Pt verbalized understanding. Questions and concerns addressed. No acute events overnight. Pt progressing toward goals. Will continue to monitor. See below and flowsheets for full assessment and VS info.           Is this a stroke patient? no    Neuro:  [place] Coma Scale  Best Eye Response: 4-->(E4) spontaneous  Best Motor Response: 6-->(M6) obeys commands  Best Verbal Response: 5-->(V5) oriented  [place] Coma Scale Score: 15  Pupil PERRLA: yes     24hr Temp:  [98.2 °F (36.8 °C)-98.5 °F (36.9 °C)]     CV:   Rhythm: normal sinus rhythm  BP goals:   SBP < 140  MAP > 65    Resp:           Plan: N/A    GI/:     Diet/Nutrition Received: regular  Last Bowel Movement: 07/26/23  Voiding Characteristics: urethral catheter (bladder)    Intake/Output Summary (Last 24 hours) at 7/28/2023 0445  Last data filed at 7/28/2023 0401  Gross per 24 hour   Intake 3669.24 ml   Output 4025 ml   Net -355.76 ml          Labs/Accuchecks:  Recent Labs   Lab 07/27/23  0550   WBC 6.26   RBC 4.99   HGB 15.4   HCT 44.9         Recent Labs   Lab 07/28/23  0041      K 4.1   CO2 23      BUN 9   CREATININE 0.7   ALKPHOS 56   ALT 16   AST 16   BILITOT 0.6      Recent Labs   Lab 07/28/23  0041   INR 1.0   APTT 25.1    No results for input(s): CPK, CPKMB, TROPONINI, MB in the last 168 hours.    Electrolytes: N/A - electrolytes WDL  Accuchecks: none    Gtts:   lactated ringers 50 mL/hr at 07/28/23 0401       LDA/Wounds:  Lines/Drains/Airways       Drain  Duration                  Urethral Catheter 07/27/23 0725 Non-latex;Straight-tip;Silicone 16 Fr. <1 day              Arterial Line  Duration             Arterial Line 07/27/23 0730 Right Radial <1 day              Peripheral Intravenous Line  Duration                  Peripheral IV - Single Lumen 16 G Right Hand -- days         Peripheral IV - Single Lumen 07/27/23 0550 20 G Anterior;Left;Proximal Forearm <1 day                   Wounds: No  Wound care consulted: No

## 2023-07-28 NOTE — PT/OT/SLP EVAL
Physical Therapy Co-Evaluation and Co-Treatment with OT    Patient Name:  Carlos Jacobson   MRN:  [id number]    Recent Surgery: Procedure(s) (LRB):  CRANIOTOMY (Left) 1 Day Post-Op    Patient required co-tx with OT secondary to need for multiple set of skilled hands to provide safest therapy and best outcomes.      Recommendations:     Discharge Recommendations:  home   Discharge Equipment Recommendations: none   Barriers to discharge: None    Highest Level of Mobility: Gait ~35'  Assistance Required: SBA no AD    Assessment:     Carlos Jacobson is a 42 y.o. male admitted with a medical diagnosis of Brain tumor. He presents with the following impairments/functional limitations:  visual deficits, impaired endurance, pain    Pt met with HOB elevated and agreeable to PT session. Pt reports his PLOF is (I) with functional mobility and ADLs using no DME. Currently, pt requires SBA for gait training using no DME. He is limited by R eye decreased vision and poor depth perception with mobility.     Pt would benefit from continued skilled acute PT 2x/wk to address above listed functional deficits, provide patient/caregiver education, reduce fall risk, and maximize (I) and safety with functional mobility.     Rehab Prognosis: Good; patient would benefit from acute skilled PT services to address these deficits and reach maximum level of function.      Plan:     During this hospitalization, patient to be seen 2 x/week to address the identified rehab impairments via gait training, therapeutic activities, therapeutic exercises, neuromuscular re-education and progress toward the following goals:    Plan of Care Expires:  08/28/23    This plan of care has been discussed with the patient/caregiver, who was included in its development and is in agreement with the identified goals and treatment plan.     Subjective     Communicated with RN prior to session.  Patient agreeable to participate.     Chief Complaint: Brain tumor s/p resection  "  Patient/Family Comments/goals: "I'm doing pretty well"    Pain/Comfort:  Pain Rating 1: 3/10  Location - Side 1: Left  Location - Orientation 1: generalized  Location 1: head  Pain Addressed 1: Reposition, Distraction  Pain Rating Post-Intervention 1: 3/10    Patients cultural, spiritual, Orthodoxy conflicts given the current situation: no    Patient's living environment is as follows:  Living Environment: Pt lives with parents in 2SH with 1 ANTWAN.   Prior Level of Function: independent with mobility and ADLs  DME used: none  DME owned (not currently used): none  Upon discharge, patient will have assistance from: Parents    Objective:     Patient found HOB elevated with peripheral IV, telemetry, SCD, pulse ox (continuous)  upon PT entry to room.    General Precautions: Standard, fall   Orthopedic Precautions:N/A   Braces: N/A   /78   Pulse 77   Temp 98.9 °F (37.2 °C) (Oral)   Resp (!) 28   Ht 6' 1" (1.854 m)   Wt 85 kg (187 lb 6.3 oz)   SpO2 98%   BMI 24.72 kg/m²   Oxygen Device:  room air      Exams:    Cognition:  Patient is oriented to Person, Place, Time, Situation  Follows multistep  commands  Insight to deficits/safety awareness: intact    Edema: None present    Postural examination/scapula alignment: Rounded shoulder    Lower Extremity Range of Motion:  Right Lower Extremity: WNL  Left Lower Extremity: WNL    Lower Extremity Strength    Right LE  Left LE    Hip Flexion: 4+/5 Hip Flexion: 4+/5   Knee Extension: 5/5 Knee Extension: 5/5   Knee Flexion: 5/5 Knee Flexion: 5/5   Ankle Dorsiflexion:  5/5 Ankle Dorsiflexion: 5/5   Ankle Plantarflexion: 5/5 Ankle Plantarflexion: 5/5        Sensation:   Light touch sensation: Intact BLEs    Functional Mobility:    Bed Mobility:  Supine to Sit: Stand-by Assistance on L side of bed  Scooting anteriorly to EOB to plant feet on floor: Stand-by Assistance    Transfers:   Sit to Stand Transfer: Stand-by Assistance  from EOB with no AD   Bed to Chair: Stand-by " Assistance with no AD              Gait:  Patient received gait training in room 35 feet with Stand-by Assistance and  no AD  Gait Assessment: narrow base of support and decreased becky  Gait Pattern Observed: Step-through reciprocal gait  Comments: All lines remained intact throughout ambulation trial, gait belt utilized. Pt with increased lateral postural sway, no overt LOB    Balance:  Static Sit:   Supervision at EOB  Normal: Patient able to maintain steady balance without handhold support.  Static Stand:   Stand-By Assist with no AD  Dynamic Stand:  Stand-By Assist with no AD      Therapeutic Activities/Exercises     Patient assisted with functional mobility as noted above  Discussed at length benefits of PT as well as d/c recommendations. Pt agreeable  Patient educated on the importance of early mobility, OOB to prevent functional decline during hospital stay  Patient was instructed to utilize staff assistance for mobility/transfers.  Patient is appropriate to transfer with SBA and RN/PCT assist  Patient educated on PT POC and role of PT in acute care  White board updated to include patient's safest level of mobility with staff assistance, RN also updated    AM-PAC 6 CLICK MOBILITY  Turning over in bed (including adjusting bedclothes, sheets and blankets)?: 4  Sitting down on and standing up from a chair with arms (e.g., wheelchair, bedside commode, etc.): 4  Moving from lying on back to sitting on the side of the bed?: 4  Moving to and from a bed to a chair (including a wheelchair)?: 3  Need to walk in hospital room?: 3  Climbing 3-5 steps with a railing?: 3  Basic Mobility Total Score: 21      Patient left up in chair with all lines intact, call button in reach, chair alarm on, RN notified, and brother present.      History/Goals:     PAST MEDICAL HISTORY:  Past Medical History:   Diagnosis Date    Allergy     Anxiety     Brain tumor     Cancer     Clostridium difficile colitis 04/28/2014    GERD  (gastroesophageal reflux disease)     Renal lesion        Past Surgical History:   Procedure Laterality Date    BILATERAL INGUINAL HERNIA REPAIR Bilateral     COLONOSCOPY      CRANIOTOMY Left 2023    Procedure: CRANIOTOMY;  Surgeon: Michele Schuler MD;  Location: [address];  Service: Neurosurgery;  Laterality: Left;  LEFT PARIETAL CRANIOTOMY WITH RESECTION OF BRAIN MASS WITH DR. MIMS    STEREOTACTIC BIOPSY OF BRAIN Left 2023    Procedure: BIOPSY, BRAIN, STEREOTACTIC - LEFT PARIETAL BRAIN BIOPSY;  Surgeon: Michele Schuler MD;  Location: [facility];  Service: Neurosurgery;  Laterality: Left;       GOALS:   Multidisciplinary Problems       Physical Therapy Goals          Problem: Physical Therapy    Goal Priority Disciplines Outcome Goal Variances Interventions   Physical Therapy Goal     PT, PT/OT Ongoing, Progressing     Description: Goals to be met by: 23     Patient will increase functional independence with mobility by performin. Sit to stand transfer with [place]  2. Bed to chair transfer with Modified [place] using LRAD if needed  3. Gait  x 150 feet with Modified [place] using LRAD if needed.   4. Ascend/descend 12 stair with bilateral Handrails and Supervision using LRAD as needed.   5. Lower extremity exercise program x15 reps per handout, with assistance as needed                         Time Tracking:     PT Received On: 23  PT Start Time: 1145     PT Stop Time: 1206  PT Total Time (min): 21 min     Billable Minutes: Evaluation 10 and Gait Training 11      Fabi Tejeda, PT  2023  Pager# [phone]

## 2023-07-28 NOTE — HOSPITAL COURSE
7/28: OR yesterday for L craniotomy for tumor resection. Tolerated procedure well. Mild right visual field cut/neglect this morning. Mri with expected residual, no major bleeding/strokes. OK to TTF today to NSGY .    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 The patient remained on abx until all drains were discontinued and was kept on appropriate DVT prophylaxis during the course of admission. At the time of discharge, the patient was tolerating PO intake without N/V, dysphagia, denied bowel or bladder dysfunction, denied new neurological symptoms, and reported pain controlled with current regimen. The surgical site was without evidence of drainage, breakdown or infection and all staples/sutures were intact. The patient will follow up in clinic as indicated in discharge instructions. All questions were answered and continued treatment/wound care instructions were discussed in detail prior to discharge.

## 2023-07-28 NOTE — PLAN OF CARE
[facility] Care Plan    POC reviewed with Carlos Jacobson and family at 1400. Pt verbalized understanding. Questions and concerns addressed. No acute events today. Pt progressing toward goals. Will continue to monitor. See below and flowsheets for full assessment and VS info.   - PRN ylenol given x 2  - TTF orders in place  - Pt worked with PT          Is this a stroke patient? no    Neuro:  [place] Coma Scale  Best Eye Response: 4-->(E4) spontaneous  Best Motor Response: 6-->(M6) obeys commands  Best Verbal Response: 5-->(V5) oriented  [place] Coma Scale Score: 15  Pupil PERRLA: yes     24 hr Temp:  [98.2 °F (36.8 °C)-98.9 °F (37.2 °C)]     CV:   Rhythm: normal sinus rhythm  BP goals:   SBP < 140  MAP > 65    Resp:           Plan: N/A    GI/:     Diet/Nutrition Received: regular  Last Bowel Movement: 07/26/23  Voiding Characteristics: voids spontaneously without difficulty    Intake/Output Summary (Last 24 hours) at 7/28/2023 1715  Last data filed at 7/28/2023 1632  Gross per 24 hour   Intake 1693.05 ml   Output 3225 ml   Net -1531.95 ml          Labs/Accuchecks:  Recent Labs   Lab 07/28/23  0814   WBC 12.19   RBC 4.59*   HGB 14.0   HCT 40.3         Recent Labs   Lab 07/28/23  0041      K 4.1   CO2 23      BUN 9   CREATININE 0.7   ALKPHOS 56   ALT 16   AST 16   BILITOT 0.6      Recent Labs   Lab 07/28/23  0041   INR 1.0   APTT 25.1    No results for input(s): CPK, CPKMB, TROPONINI, MB in the last 168 hours.    Electrolytes: N/A - electrolytes WDL  Accuchecks: none    Gtts:      LDA/Wounds:  Lines/Drains/Airways       Peripheral Intravenous Line  Duration                  Peripheral IV - Single Lumen 16 G Right Hand -- days         Peripheral IV - Single Lumen 07/27/23 0550 20 G Anterior;Left;Proximal Forearm 1 day                  Wounds: No  Wound care consulted: No

## 2023-07-28 NOTE — PT/OT/SLP EVAL
Occupational Therapy  Co Evaluation    Name: Carlos Jacobson  MRN: [id number]  Admitting Diagnosis: Brain tumor  Recent Surgery: Procedure(s) (LRB):  CRANIOTOMY (Left) 1 Day Post-Op    Recommendations:     Discharge Recommendations: home  Discharge Equipment Recommendations:  none  Barriers to discharge:  None    Assessment:     Carlos Jacobson is a 42 y.o. male with a medical diagnosis of Brain tumor.  He presents with visual deficits causing slight functional deficits and safety hazards.      Rehab Prognosis: Good; patient would benefit from acute skilled OT services to address these deficits and reach maximum level of function.       Plan:     Patient to be seen 2 x/week to address the above listed problems via self-care/home management, therapeutic exercises, therapeutic activities, neuromuscular re-education  Plan of Care Expires: 08/12/23  Plan of Care Reviewed with: patient    Subjective     Chief Complaint: Visual deficit in R eye   Patient/Family Comments/goals:     Occupational Profile:  Living Environment: Patient usually lives in a [place], but has been staying with parents since diagnosis. Patient's home is a 2 story house with stairs w/ railings on both sides. Patient has access to a walk in shower downstairs and a tub/shower upstairs. Patient does not use any DME at this time. Patient bedroom is upstairs but patient willing to sleep downstairs if needed.   Previous level of function: Independent  Roles and Routines: ; brother; son    Equipment Used at Home: none  Assistance upon Discharge: Patient's parents available to assist patient if needed     Pain/Comfort:  Pain Rating 1: 3/10 (Not pain just soreness)  Location 1: head  Pain Addressed 1: Distraction    Patients cultural, spiritual, Mandaeism conflicts given the current situation: no    Objective:     Communicated with: RN prior to session.  Patient found supine with peripheral IV upon OT entry to room.    General Precautions: Standard,  fall  Orthopedic Precautions: N/A  Braces: N/A  Respiratory Status: Room air    Occupational Performance:    Bed Mobility:    Patient completed Rolling/Turning to Left with  stand by assistance  Patient completed Scooting/Bridging with stand by assistance  Patient completed Supine to Sit with stand by assistance  Patient completed Sit to Supine with stand by assistance    Functional Mobility/Transfers:  Patient completed Sit <> Stand Transfer with stand by assistance  with  no assistive device   Functional Mobility: Patient able to ambulate in room with no LOB    Activities of Daily Living:  Grooming: stand by assistance EOB  Upper Body Dressing: stand by assistance EOB  Lower Body Dressing: stand by assistance EOB    Cognitive/Visual Perceptual:  Patient having difficulty seeing R visual field. Patient peripheral vision tested and patient unable to see to the Right peripheral side.    Physical Exam:  Upper Extremity Range of Motion:     -       Right Upper Extremity: WFL  -       Left Upper Extremity: WFL  Upper Extremity Strength:    -       Right Upper Extremity: WFL  -       Left Upper Extremity: WFL   Strength:    -       Right Upper Extremity: WFL  -       Left Upper Extremity: WFL  Fine Motor Coordination:    -       Intact    AMPAC 6 Click ADL:  AMPAC Total Score: 24    Treatment & Education:  Patient demonstrates good functional skills in ADL activity. OT educated patient on R visual loss. OT would like to continue to see patient while at [facility] in order to further educate patient on compensatory strategies and safety regarding R visual loss. Patient should D/C safely home. Co evaluation completed due to patient medical status and to ensure patient safety.     Patient left up in chair with all lines intact, chair alarm on, RN notified, and brother present    GOALS:   Multidisciplinary Problems       Occupational Therapy Goals          Problem: Occupational Therapy    Goal Priority Disciplines Outcome  Interventions   Occupational Therapy Goal     OT, PT/OT Ongoing, Progressing    Description: Goals to be met by: 7/28/23     Patient will increase functional independence with ADLs by performing:    - Patient will be educated on compensatory strategies for visual impairment for improved functional ability.   - Grooming while standing at sink with [place] while utilizing compensatory strategies for visual impairment.                           History:     Past Medical History:   Diagnosis Date    Allergy     Anxiety     Brain tumor     Cancer     Clostridium difficile colitis 04/28/2014    GERD (gastroesophageal reflux disease)     Renal lesion          Past Surgical History:   Procedure Laterality Date    BILATERAL INGUINAL HERNIA REPAIR Bilateral 1987    COLONOSCOPY  2014    CRANIOTOMY Left 7/27/2023    Procedure: CRANIOTOMY;  Surgeon: Michele Schuler MD;  Location: [address];  Service: Neurosurgery;  Laterality: Left;  LEFT PARIETAL CRANIOTOMY WITH RESECTION OF BRAIN MASS WITH DR. MIMS    STEREOTACTIC BIOPSY OF BRAIN Left 6/13/2023    Procedure: BIOPSY, BRAIN, STEREOTACTIC - LEFT PARIETAL BRAIN BIOPSY;  Surgeon: Michele Schuler MD;  Location: [facility];  Service: Neurosurgery;  Laterality: Left;       Time Tracking:     OT Date of Treatment: 07/28/23  OT Start Time: 1145  OT Stop Time: 1205  OT Total Time (min): 20 min    Billable Minutes:Evaluation 10 Minutes   Therapeutic Activity 10 Minutes     7/28/2023

## 2023-07-28 NOTE — SUBJECTIVE & OBJECTIVE
Interval History: 7/28: OR yesterday for L craniotomy for tumor resection. Tolerated procedure well. Mild right visual field cut/neglect this morning. Mri with expected residual, no major bleeding/strokes. OK to TTF today to NSGY .    Medications:  Continuous Infusions:  Scheduled Meds:   cetirizine  5 mg Oral Daily    dexAMETHasone  4 mg Oral Q6H    EScitalopram oxalate  15 mg Oral Daily    levETIRAcetam  750 mg Oral BID    pantoprazole  40 mg Oral Daily     PRN Meds:acetaminophen, labetalol, ondansetron, ondansetron, oxyCODONE, oxyCODONE, polyethylene glycol, sodium chloride 0.9%     Review of Systems  Objective:     Weight: 85 kg (187 lb 6.3 oz)  Body mass index is 24.72 kg/m².  Vital Signs (Most Recent):  Temp: 98.9 °F (37.2 °C) (07/28/23 1101)  Pulse: 78 (07/28/23 1201)  Resp: (!) 22 (07/28/23 1201)  BP: (!) 106/59 (07/28/23 1201)  SpO2: 96 % (07/28/23 1201) Vital Signs (24h Range):  Temp:  [98.2 °F (36.8 °C)-98.9 °F (37.2 °C)] 98.9 °F (37.2 °C)  Pulse:  [65-85] 78  Resp:  [14-28] 22  SpO2:  [95 %-97 %] 96 %  BP: (102-119)/(59-73) 106/59  Arterial Line BP: ()/(54-85) 123/59     Date 07/28/23 0700 - 07/29/23 0659   Shift 9784-8687 7276-5252 4320-0040 24 Hour Total   INTAKE   I.V.(mL/kg) 161.9(1.9)   161.9(1.9)   Shift Total(mL/kg) 161.9(1.9)   161.9(1.9)   OUTPUT   Urine(mL/kg/hr) 900   900   Shift Total(mL/kg) 900(10.6)   900(10.6)   Weight (kg) 85 85 85 85                               Physical Exam         Neurosurgery Physical Exam    Physical Exam:    Constitutional: No distress.     HEENT: atraumatic/normocephalic    Cardiovascular: Regular rhythm.     Pulm: aerating well, saturating well    Abdominal: Soft.     Psych/Behavior: He is alert.       E4V5M6  AOx3  PERRL  EOMI  Mild right visual field cut/neglect  Face Symmetric  Tongue midline  BUE 5/5  BLE 5/5  No drift      Significant Labs:  Recent Labs   Lab 07/27/23  0550 07/28/23  0041   GLU 88 137*    139   K 4.0 4.1    107   CO2 25 23    BUN 11 9   CREATININE 0.8 0.7   CALCIUM 9.8 9.1   MG  --  2.3     Recent Labs   Lab 07/27/23  0550 07/28/23  0814   WBC 6.26 12.19   HGB 15.4 14.0   HCT 44.9 40.3    241     Recent Labs   Lab 07/27/23  0550 07/28/23  0041   INR 1.0 1.0   APTT 26.4 25.1     Microbiology Results (last 7 days)       ** No results found for the last 168 hours. **          All pertinent labs from the last 24 hours have been reviewed.    Significant Diagnostics:  I have reviewed and interpreted all pertinent imaging results/findings within the past 24 hours.

## 2023-07-28 NOTE — ASSESSMENT & PLAN NOTE
42 M presents for elective resection of oligodendroglioma on 7/27/23    MRI post op reviewed with expected non enhancing residual in right hemisphere and at superior aspect of tumor. No significant hemorrhage/stroke    -- ICU postop, OK to TTF to NSGY today   -- q4 hour neurochecks  -- remove a line  -- SBP < 160  -- Dex 4q6 wean over 2 weeks  -- Keppra 500 BID  -- OK for diet  -- OK for SQH 24 hours after procedure.   -- PT/OT OOB today

## 2023-07-28 NOTE — PLAN OF CARE
07/28/23 1338   Post-Acute Status   Post-Acute Authorization Other   Other Status No Post-Acute Service Needs     Linette Sanchez LCSW  Neurocritical Care   [facility]  [id number]

## 2023-07-28 NOTE — PROGRESS NOTES
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 He reports that his symptoms began with occasional migraines involving waxing and waning L foot numbness. On evaluation of his headaches, CTH revealed a non-enhancing L parietotemporal tumor involving the corpus collosum. The pt underwent biopsy on 6/13/2023 (Michele Schuler) with pathology consistent with oligodendroglioma, WHO grade 2. He presented today for tumor debulking prior to further oncological treatment.     Patient was admitted to [facility] for post-op management in stable condition. Procedure was uncomplicated.      Hospital Course: 07/28/2023 NAEON. Complaints of tension type headaches alleviated with prn Mg and Reglan. Post-op MRI with stable post-op changes. SBP remained <140 without cardene gtt.      Interval History:  See hospital course    Review of Systems   Constitutional:  Positive for fatigue. Negative for activity change and appetite change.   HENT:  Negative for sore throat.    Eyes:  Negative for visual disturbance.   Respiratory:  Negative for cough, chest tightness and shortness of breath.    Cardiovascular:  Negative for chest pain and palpitations.   Gastrointestinal:  Negative for abdominal distention, abdominal pain, diarrhea, nausea and vomiting.   Neurological:  Positive for numbness and headaches. Negative for dizziness, tremors, seizures and speech difficulty.     Objective:     Vitals:  Temp: 98.9 °F (37.2 °C)  Pulse: 78  Rhythm: normal sinus rhythm  BP: (!) 106/59  MAP (mmHg): 78  Resp: (!) 22  SpO2: 96 %    Temp  Min: 98.2 °F (36.8 °C)  Max: 98.9 °F (37.2 °C)  Pulse  Min: 65  Max: 85  BP  Min: 102/65  Max: 119/73  MAP (mmHg)   Min: 77  Max: 91  Resp  Min: 14  Max: 28  SpO2  Min: 95 %  Max: 97 %    07/27 0701 - 07/28 0700  In: 4054.6 [P.O.:826; I.V.:855.5]  Out: 4150 [Urine:4000]            Physical Exam  Constitutional:       General: He is not in acute distress.     Appearance: Normal appearance. He is normal weight. He is not ill-appearing or toxic-appearing.   HENT:      Head:      Comments: Incision site overlying left parietal region c/d/I with no hematoma.     Mouth/Throat:      Mouth: Mucous membranes are moist.      Pharynx: Oropharynx is clear.   Eyes:      General: No scleral icterus.     Extraocular Movements: Extraocular movements intact.      Conjunctiva/sclera: Conjunctivae normal.      Pupils: Pupils are equal, round, and reactive to light.   Cardiovascular:      Rate and Rhythm: Normal rate and regular rhythm.      Pulses: Normal pulses.      Heart sounds: Normal heart sounds.   Pulmonary:      Effort: Pulmonary effort is normal. No respiratory distress.      Breath sounds: Normal breath sounds.   Abdominal:      General: Abdomen is flat. Bowel sounds are normal. There is no distension.      Palpations: Abdomen is soft.      Tenderness: There is no abdominal tenderness.   Skin:     General: Skin is warm and dry.   Neurological:      Mental Status: He is alert.      Comments: A&Ox4. Speech is fluent, no dysarthria.  PERRL. EOMI.  No deficits in CN on gross exam  5/5 strength throughout  Sensation to light touch preserved throughout all extremities.  Gait untested.          Medications:  Continuous Scheduledcetirizine, 5 mg, Daily  dexAMETHasone, 4 mg, Q6H  EScitalopram oxalate, 15 mg, Daily  levETIRAcetam, 750 mg, BID  pantoprazole, 40 mg, Daily    PRNacetaminophen, 650 mg, Q6H PRN  labetalol, 10 mg, Q15 Min PRN  ondansetron, 8 mg, Q8H PRN  ondansetron, 4 mg, Q8H PRN  oxyCODONE, 5 mg, Q4H PRN  oxyCODONE, 10 mg, Q4H PRN  polyethylene glycol, 17 g, Daily PRN  sodium chloride 0.9%, 10 mL, PRN      Today I personally reviewed  pertinent medications, lines/drains/airways, imaging, laboratory results,    Diet  Diet Adult Regular (IDDSI Level 7)  Diet Adult Regular (IDDSI Level 7)      Assessment/Plan:     Psychiatric  Depression  Current Home Meds: Escitalopram 15 mg QD  -Continuing home medication    Oncology  * Brain tumor  -Oligodendroglioma, WHO Class 2  -POD 0 s/p tumor debulking aguilar-craniotomy by NSGY  -Specimen sent to pathology  -further oncological treatment pending recovery    -Admitted to [facility]  -Now stable without need for cardene gtt for SBP control. Planning for stepdown to NSGY.  -SBP <140  -Keppra 750 BID, per home dosing  -Decadron 4mg Q6, plan for 2 week taper, per NSGY  -Ancef  -q1 neuro checks  -Trending q1 vitals, CBC, CMP  -PRN pain control  -Patient reports that he is not taking home dosing of Namenda, prescribed to prevent neurocognitive toxicity of any future whole brain irradiation.  -NSGY following            The patient is being Prophylaxed for:  Venous Thromboembolism with: None  Stress Ulcer with: PPI  Ventilator Pneumonia with: not applicable    Activity Orders          Diet Adult Regular (IDDSI Level 7): Regular starting at 07/27 1544    Turn patient starting at 07/27 1400        Full Code    Joshua Gonzalez MD  Neurocritical Care  [facility] - Neuro Critical Care

## 2023-07-28 NOTE — PT/OT/SLP EVAL
Speech Language Pathology Evaluation  Cognitive/Bedside Swallow    Patient Name:  Carlos Jacobson   MRN:  [id number]  Admitting Diagnosis: Brain tumor    Recommendations:                  General Recommendations:  Speech/language therapy and Cognitive-linguistic therapy  Diet recommendations:  Regular, Thin   Aspiration Precautions: Small bites/sips and Standard aspiration precautions   General Precautions: Standard, fall  Communication strategies:  provide increased time to answer    Assessment:     Carlos Jacobson is a 42 y.o. male with an SLP diagnosis of Aphasia and Cognitive-Linguistic Impairment.    History:     Past Medical History:   Diagnosis Date    Allergy     Anxiety     Brain tumor     Cancer     Clostridium difficile colitis 04/28/2014    GERD (gastroesophageal reflux disease)     Renal lesion        Past Surgical History:   Procedure Laterality Date    BILATERAL INGUINAL HERNIA REPAIR Bilateral 1987    COLONOSCOPY  2014    CRANIOTOMY Left 7/27/2023    Procedure: CRANIOTOMY;  Surgeon: Michele Schuler MD;  Location: [address];  Service: Neurosurgery;  Laterality: Left;  LEFT PARIETAL CRANIOTOMY WITH RESECTION OF BRAIN MASS WITH DR. MIMS    STEREOTACTIC BIOPSY OF BRAIN Left 6/13/2023    Procedure: BIOPSY, BRAIN, STEREOTACTIC - LEFT PARIETAL BRAIN BIOPSY;  Surgeon: Michele Schuler MD;  Location: [facility];  Service: Neurosurgery;  Laterality: Left;       Social History: Patient lives at [facility] and is a . He enjoys cars and music.    Prior diet: Regular/thin .      Subjective     Awake/alert  Parents at bedside    Pain/Comfort:  Pain Rating 1: 0/10  Pain Rating Post-Intervention 1: 0/10    Respiratory Status: Room air    Objective:     Cognitive Status:    Orientation Person, Place, Situation, and Time increased time to answer provided  Memory WFL  Problem Solving reasoning 3/3 cued, Categories 100% concrete and cues required for abstract, Sequencing 3/4 accy, and  Compare/contrast able to compare ind'ly but required prompts to contrast      Receptive Language:   Comprehension:   Questions Complex yes/no 100%  Commands  complex/abstract commands 90%    Pragmatics:    WFL    Expressive Language:  Verbal:    Pt with occasional word finding noted which he stated was a notable symptom prior to sx. Pt able to find words with increased time and circumlocution   Nonverbal:   WFL      Motor Speech:  WFL    Voice:   WFL    Visual-Spatial:  TBA    Reading:   TBA      Written Expression:   TBA    Oral Musculature Evaluation  Oral Musculature: WFL  Dentition: present and adequate  Oral Labial Strength and Mobility: WFL  Lingual Strength and Mobility: WFL  Voice Prior to PO Intake: clear    Bedside Swallow Eval:   Consistencies Assessed:  Thin liquids x6 straw  Solids x1/2 garcia cracker       Oral Phase:   WFL    Pharyngeal Phase:   no overt clinical signs/symptoms of aspiration      Goals:   Multidisciplinary Problems       SLP Goals          Problem: SLP    Goal Priority Disciplines Outcome   SLP Goal     SLP Ongoing, Progressing   Description: Speech Language Pathology Goals  Goals expected to be met by 8/4\    1. Pt will complete higher level word finding tasks with 80% accy and min cues   2. Pt will complete problem solving tasks with 75% accy ind'ly  3. Pt will participate in ongoing assessment of reading, writing and visual spatial aspects.                                Plan:     Patient to be seen:  4 x/week   Plan of Care reviewed with:  patient   SLP Follow-Up:  Yes       Discharge recommendations:    likely ST post d/c       Time Tracking:     SLP Treatment Date:   07/28/23  Speech Start Time:  0951  Speech Stop Time:  1005     Speech Total Time (min):  14 min    Billable Minutes: Eval 7  and Eval Swallow and Oral Function 7    07/28/2023

## 2023-07-28 NOTE — HOSPITAL COURSE
07/28/2023 NAEON. Complaints of tension type headaches alleviated with prn Mg and Reglan. Post-op MRI with stable post-op changes. SBP remained <140 without cardene gtt.

## 2023-07-28 NOTE — ASSESSMENT & PLAN NOTE
-Oligodendroglioma, WHO Class 2  -POD 0 s/p tumor debulking aguilar-craniotomy by NSGY  -Specimen sent to pathology  -further oncological treatment pending recovery    -Admitted to [facility]  -Now stable without need for cardene gtt for SBP control. Planning for stepdown to NSGY.  -SBP <140  -Keppra 750 BID, per home dosing  -Decadron 4mg Q6, plan for 2 week taper, per NSGY  -Ancef  -q1 neuro checks  -Trending q1 vitals, CBC, CMP  -PRN pain control  -Patient reports that he is not taking home dosing of Namenda, prescribed to prevent neurocognitive toxicity of any future whole brain irradiation.  -NSGY following

## 2023-07-28 NOTE — PLAN OF CARE
Eval complete     Problem: Physical Therapy  Goal: Physical Therapy Goal  Description: Goals to be met by: 23     Patient will increase functional independence with mobility by performin. Sit to stand transfer with [place]  2. Bed to chair transfer with Modified [place] using LRAD if needed  3. Gait  x 150 feet with Modified [place] using LRAD if needed.   4. Ascend/descend 12 stair with bilateral Handrails and Supervision using LRAD as needed.   5. Lower extremity exercise program x15 reps per handout, with assistance as needed    Outcome: Ongoing, Progressing

## 2023-07-29 VITALS
HEIGHT: 73 IN | HEART RATE: 81 BPM | BODY MASS INDEX: 24.83 KG/M2 | OXYGEN SATURATION: 97 % | WEIGHT: 187.38 LBS | SYSTOLIC BLOOD PRESSURE: 125 MMHG | DIASTOLIC BLOOD PRESSURE: 77 MMHG | TEMPERATURE: 99 F | RESPIRATION RATE: 16 BRPM

## 2023-07-29 LAB
ALBUMIN SERPL BCP-MCNC: 3.6 G/DL (ref 3.5–5.2)
ALP SERPL-CCNC: 57 U/L (ref 55–135)
ALT SERPL W/O P-5'-P-CCNC: 13 U/L (ref 10–44)
ANION GAP SERPL CALC-SCNC: 13 MMOL/L (ref 8–16)
AST SERPL-CCNC: 18 U/L (ref 10–40)
BASOPHILS # BLD AUTO: 0.01 K/UL (ref 0–0.2)
BASOPHILS NFR BLD: 0.1 % (ref 0–1.9)
BILIRUB SERPL-MCNC: 0.5 MG/DL (ref 0.1–1)
BUN SERPL-MCNC: 12 MG/DL (ref 6–20)
CALCIUM SERPL-MCNC: 9.3 MG/DL (ref 8.7–10.5)
CHLORIDE SERPL-SCNC: 106 MMOL/L (ref 95–110)
CO2 SERPL-SCNC: 22 MMOL/L (ref 23–29)
CREAT SERPL-MCNC: 0.8 MG/DL (ref 0.5–1.4)
DIFFERENTIAL METHOD: ABNORMAL
EOSINOPHIL # BLD AUTO: 0 K/UL (ref 0–0.5)
EOSINOPHIL NFR BLD: 0 % (ref 0–8)
ERYTHROCYTE [DISTWIDTH] IN BLOOD BY AUTOMATED COUNT: 12 % (ref 11.5–14.5)
EST. GFR  (NO RACE VARIABLE): >60 ML/MIN/1.73 M^2
GLUCOSE SERPL-MCNC: 114 MG/DL (ref 70–110)
HCT VFR BLD AUTO: 42.1 % (ref 40–54)
HGB BLD-MCNC: 14.1 G/DL (ref 14–18)
IMM GRANULOCYTES # BLD AUTO: 0.04 K/UL (ref 0–0.04)
IMM GRANULOCYTES NFR BLD AUTO: 0.3 % (ref 0–0.5)
LYMPHOCYTES # BLD AUTO: 1.4 K/UL (ref 1–4.8)
LYMPHOCYTES NFR BLD: 10.1 % (ref 18–48)
MAGNESIUM SERPL-MCNC: 2.1 MG/DL (ref 1.6–2.6)
MCH RBC QN AUTO: 30.5 PG (ref 27–31)
MCHC RBC AUTO-ENTMCNC: 33.5 G/DL (ref 32–36)
MCV RBC AUTO: 91 FL (ref 82–98)
MONOCYTES # BLD AUTO: 0.9 K/UL (ref 0.3–1)
MONOCYTES NFR BLD: 6.3 % (ref 4–15)
NEUTROPHILS # BLD AUTO: 11.3 K/UL (ref 1.8–7.7)
NEUTROPHILS NFR BLD: 83.2 % (ref 38–73)
NRBC BLD-RTO: 0 /100 WBC
PHOSPHATE SERPL-MCNC: 2.7 MG/DL (ref 2.7–4.5)
PLATELET # BLD AUTO: 229 K/UL (ref 150–450)
PMV BLD AUTO: 10.1 FL (ref 9.2–12.9)
POTASSIUM SERPL-SCNC: 4.3 MMOL/L (ref 3.5–5.1)
PROT SERPL-MCNC: 7 G/DL (ref 6–8.4)
RBC # BLD AUTO: 4.62 M/UL (ref 4.6–6.2)
SODIUM SERPL-SCNC: 141 MMOL/L (ref 136–145)
WBC # BLD AUTO: 13.61 K/UL (ref 3.9–12.7)

## 2023-07-29 PROCEDURE — 85025 COMPLETE CBC W/AUTO DIFF WBC: CPT

## 2023-07-29 PROCEDURE — 25000003 PHARM REV CODE 250: Performed by: NEUROLOGICAL SURGERY

## 2023-07-29 PROCEDURE — 25000003 PHARM REV CODE 250

## 2023-07-29 PROCEDURE — 80053 COMPREHEN METABOLIC PANEL: CPT

## 2023-07-29 PROCEDURE — 63600175 PHARM REV CODE 636 W HCPCS: Performed by: STUDENT IN AN ORGANIZED HEALTH CARE EDUCATION/TRAINING PROGRAM

## 2023-07-29 PROCEDURE — 25000003 PHARM REV CODE 250: Performed by: PHYSICIAN ASSISTANT

## 2023-07-29 PROCEDURE — 83735 ASSAY OF MAGNESIUM: CPT

## 2023-07-29 PROCEDURE — 84100 ASSAY OF PHOSPHORUS: CPT

## 2023-07-29 RX ORDER — OXYCODONE HYDROCHLORIDE 5 MG/1
5 TABLET ORAL EVERY 4 HOURS PRN
Qty: 30 TABLET | Refills: 0 | Status: SHIPPED | OUTPATIENT
Start: 2023-07-29 | End: 2024-01-05

## 2023-07-29 RX ORDER — DEXAMETHASONE 2 MG/1
TABLET ORAL
Qty: 37 TABLET | Refills: 0 | Status: SHIPPED | OUTPATIENT
Start: 2023-07-29 | End: 2023-08-12

## 2023-07-29 RX ORDER — FAMOTIDINE 20 MG/1
20 TABLET, FILM COATED ORAL 2 TIMES DAILY
Qty: 28 TABLET | Refills: 0 | Status: SHIPPED | OUTPATIENT
Start: 2023-07-29 | End: 2024-01-05

## 2023-07-29 RX ORDER — LEVETIRACETAM 750 MG/1
750 TABLET ORAL 2 TIMES DAILY
Qty: 180 TABLET | Refills: 3 | Status: SHIPPED | OUTPATIENT
Start: 2023-07-29

## 2023-07-29 RX ADMIN — DEXAMETHASONE 4 MG: 4 TABLET ORAL at 05:07

## 2023-07-29 RX ADMIN — ESCITALOPRAM OXALATE 15 MG: 5 SOLUTION ORAL at 08:07

## 2023-07-29 RX ADMIN — LEVETIRACETAM 750 MG: 750 TABLET, FILM COATED ORAL at 08:07

## 2023-07-29 RX ADMIN — ACETAMINOPHEN 650 MG: 325 TABLET ORAL at 04:07

## 2023-07-29 RX ADMIN — PANTOPRAZOLE SODIUM 40 MG: 40 TABLET, DELAYED RELEASE ORAL at 08:07

## 2023-07-29 RX ADMIN — CETIRIZINE HYDROCHLORIDE 5 MG: 5 TABLET, FILM COATED ORAL at 08:07

## 2023-07-29 NOTE — PLAN OF CARE
[facility] - Neuro Critical Care  Discharge Final Note    Primary Care Provider: Lionel Javed MD    Expected Discharge Date: 7/29/2023    Patient to be discharged home.  The patient does not have any home needs.  Family to provide transportation home.  Neurosurgery clinic to schedule follow up appointment.    Future Appointments   Date Time Provider Department Center   8/11/2023 10:00 AM NURSE, [facility] NEUROSURGERY [facility]   8/23/2023  1:30 PM [facility] CT1 LIMIT 500 LBS [facility] CT SCAN [place]   8/23/2023  2:30 PM Michele Schuler MD [facility]        Final Discharge Note (most recent)       Final Note - 07/29/23 0952          Final Note    Assessment Type Final Discharge Note     Anticipated Discharge Disposition Home or Self Care        Post-Acute Status    Post-Acute Authorization Other     Other Status No Post-Acute Service Needs     Discharge Delays None known at this time                     Important Message from Medicare             Contact Info       Man Segura MD   Specialty: Neurosurgery, Spine Surgery    [address]  [place] [address]   Phone: [phone]       Next Steps: Follow up in 2 week(s)    Instructions: For wound re-check    Michele Schuler MD   Specialty: Neurosurgery    [address]  [address]  [facility] [address]   Phone: [phone]       Next Steps: Follow up in 2 week(s)    Instructions: For wound re-check    Michele Schuler MD   Specialty: Neurosurgery    [address]  [facility] [address]   Phone: [phone]       Next Steps: Follow up

## 2023-07-29 NOTE — DISCHARGE INSTRUCTIONS
--Patient stable for discharge to home    --Please take prescriptions as detailed in medication list  --Take dexamethasone steroid taper until follow up   --Take pepcid for stomach ulcer prophylaxis while on steroids  --Continue keppra for seizure prophylaxis    --All questions/concerns addressed and answered    --Please followup with neurosurgery clinic in 2 weeks for wound check; to be arranged by Neurosurgery Clinic     --Please call immediately for any new onset nausea/vomiting/fever/chills, wound breakdown, numbness/tingling/weakness    Wound Care Instructions:  -If you have any dressings at discharge, please remove 48 hours after the surgery.  -If you have steri strips, do not remove, as they will fall off.  -If you have staples, do not remove, as they will be removed at clinic follow up.  -You may shower daily but do not soak or submerge wound in water.  -Scalp/head incisions, wash hair daily with baby shampoo and do not use hair products. Pat incision dry, do not rub.  -For head incisions, do not wear scarfs or hats.  -Keep all wounds clean, dry, and open to air.  -Do not apply creams or ointments to the wound.  -No driving while on narcotic pain medications  -Call Neurosurgery if the wound opens, drains, or becomes red

## 2023-07-29 NOTE — DISCHARGE SUMMARY
[facility]  Neurosurgery  Discharge Summary      Patient Name: Carlos Jacobson  MRN: [id number]  Admission Date: 7/27/2023  Hospital Length of Stay: 2 days  Discharge Date and Time:  07/29/2023 9:00 AM  Attending Physician: Man Segura MD   Discharging Provider: Tosha Hendrickson MD  Primary Care Provider: Lionel Javed MD    HPI:   No notes on file    Procedure(s) (LRB):  CRANIOTOMY (Left)     Hospital Course: 7/28: OR yesterday for L craniotomy for tumor resection. Tolerated procedure well. Mild right visual field cut/neglect this morning. Mri with expected residual, no major bleeding/strokes. OK to TTF today to NSGY .    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 The patient remained on abx until all drains were discontinued and was kept on appropriate DVT prophylaxis during the course of admission. At the time of discharge, the patient was tolerating PO intake without N/V, dysphagia, denied bowel or bladder dysfunction, denied new neurological symptoms, and reported pain controlled with current regimen. The surgical site was without evidence of drainage, breakdown or infection and all staples/sutures were intact. The patient will follow up in clinic as indicated in discharge instructions. All questions were answered and continued treatment/wound care instructions were discussed in detail prior to discharge.        Goals of Care Treatment Preferences:  Code Status: Full Code      Consults:   Consults (From admission, onward)        Status Ordering Provider     IP consult to case management/social work  Once        Provider:  (Not yet assigned)    Acknowledged ANGIE MARCOS          Pending Diagnostic Studies:     Procedure Component Value Units Date/Time    Specimen to Pathology, Surgery Neurosurgery [[id number]] Collected: 07/27/23 1025    Order Status: Sent Lab Status: In process  Updated: 07/27/23 1541    Specimen: Tissue         Final Active Diagnoses:    Diagnosis Date Noted POA    PRINCIPAL PROBLEM:  Brain tumor [D49.6] 05/23/2023 Yes    Depression [F32.A] 07/27/2023 Unknown    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    Specialties: Neurosurgery, Spine Surgery  Why: For wound re-check  Contact information:  [address]  [facility] [address]  [phone]             Michele Schuler MD Follow up in 2 week(s).    Specialty: Neurosurgery  Why: For wound re-check  Contact information:  [address]  [address]  [facility] [address]  [phone]             Michele Schuler MD .    Specialty: Neurosurgery  Contact information:  [address]  [facility] [address]  [phone]                       Patient Instructions:      Notify your health care provider if you experience any of the following:  temperature >100.4     Notify your health care provider if you experience any of the following:  persistent nausea and vomiting or diarrhea     Notify your health care provider if you experience any of the following:  severe uncontrolled pain     Notify your health care provider if you experience any of the following:  redness, tenderness, or signs of infection (pain, swelling, redness, odor or green/yellow discharge around incision site)     Notify your health care provider if you experience any of the following:  difficulty breathing or increased cough     Notify your health care provider if you experience any of the following:  severe persistent headache     Notify your health care provider if you experience any of the following:  worsening rash     Notify your health care provider if you experience any of the following:  persistent dizziness,  light-headedness, or visual disturbances     Notify your health care provider if you experience any of the following:  increased confusion or weakness     Activity as tolerated     Medications:  Reconciled Home Medications:      Medication List      START taking these medications    dexAMETHasone 2 MG tablet  Commonly known as: DECADRON  Take 1 tablet (2 mg total) by mouth every 6 (six) hours for 4 days, THEN 1 tablet (2 mg total) every 8 (eight) hours for 4 days, THEN 1 tablet (2 mg total) every 12 (twelve) hours for 3 days, THEN 1 tablet (2 mg total) once daily for 3 days.  Start taking on: July 29, 2023     famotidine 20 MG tablet  Commonly known as: PEPCID  Take 1 tablet (20 mg total) by mouth 2 (two) times daily. for 14 days     oxyCODONE 5 MG immediate release tablet  Commonly known as: ROXICODONE  Take 1 tablet (5 mg total) by mouth every 4 (four) hours as needed for Pain.        CONTINUE taking these medications    ascorbic acid (vitamin C) 1000 MG tablet  Commonly known as: VITAMIN C  Take 1,000 mg by mouth once daily.     EScitalopram oxalate 10 MG tablet  Commonly known as: LEXAPRO  Take 1.5 tablets (15 mg total) by mouth once daily.     levETIRAcetam 750 MG Tab  Commonly known as: KEPPRA  Take 1 tablet (750 mg total) by mouth 2 (two) times daily.     levocetirizine 5 MG tablet  Commonly known as: XYZAL  Take 5 mg by mouth every evening.     multivitamin per tablet  Commonly known as: THERAGRAN  Take 1 tablet by mouth once daily.     PROBIOTIC ORAL  Take 1 tablet by mouth once daily.        STOP taking these medications    acetaminophen 325 MG tablet  Commonly known as: TYLENOL     memantine 10 MG Tab  Commonly known as: NAMENDA     memantine tablet pack  Commonly known as: NAMENDA TITRATION PACK     oxyCODONE-acetaminophen 7.5-325 mg per tablet  Commonly known as: PERCOCET            Tosha Hendrickson MD  Neurosurgery  [facility]

## 2023-07-31 LAB
BLD PROD TYP BPU: NORMAL
BLD PROD TYP BPU: NORMAL
BLOOD UNIT EXPIRATION DATE: NORMAL
BLOOD UNIT EXPIRATION DATE: NORMAL
BLOOD UNIT TYPE CODE: 5100
BLOOD UNIT TYPE CODE: 5100
BLOOD UNIT TYPE: NORMAL
BLOOD UNIT TYPE: NORMAL
CODING SYSTEM: NORMAL
CODING SYSTEM: NORMAL
COMMENT: NORMAL
CROSSMATCH INTERPRETATION: NORMAL
CROSSMATCH INTERPRETATION: NORMAL
DISPENSE STATUS: NORMAL
DISPENSE STATUS: NORMAL
FINAL PATHOLOGIC DIAGNOSIS: NORMAL
FROZEN SECTION DIAGNOSIS: NORMAL
FROZEN SECTION FOOTNOTE: NORMAL
GROSS: NORMAL
Lab: NORMAL
MICROSCOPIC EXAM: NORMAL
TRANS ERYTHROCYTES VOL PATIENT: NORMAL ML
TRANS ERYTHROCYTES VOL PATIENT: NORMAL ML

## 2023-08-01 ENCOUNTER — PATIENT OUTREACH (OUTPATIENT)
Dept: ADMINISTRATIVE | Facility: CLINIC | Age: 43
End: 2023-08-01
Payer: COMMERCIAL

## 2023-08-01 NOTE — PROGRESS NOTES
C3 nurse spoke with Carlos Jacobson  for a TCC post hospital discharge follow up call. The patient does not have a scheduled HOSFU appointment with Lionel Javed MD  within 5-7 days post hospital discharge date 07/29/2023. C3 nurse was unable to schedule HOSFU appointment in [facility].

## 2023-08-08 ENCOUNTER — TUMOR BOARD CONFERENCE (OUTPATIENT)
Dept: NEUROSURGERY | Facility: CLINIC | Age: 43
End: 2023-08-08
Payer: COMMERCIAL

## 2023-08-09 NOTE — PROGRESS NOTES
[facility]   NEURO-ONCOLOGICAL MULTIDISCIPLINARY TUMOR BOARD  PATIENT REVIEW FORM  ____________________________________________________________  Kassy SERRA, attest that this documentation has been prepared under the direction and in the presence of Man Segura MD.     PATIENT ID: Carlos Jacobson is a 42 y.o. male  DATE: 08/09/2023    This patient's relevant clinical data was reviewed before the multidisciplinary tumor board in order to establish an optimum treatment plan in this patient. The patient's clinical data were presented at our Multi-Disciplinary Tumor Board which included representatives of Neurosurgery, Neuro-Radiology, Neuro-Pathology, Radiation Oncology, Medical Oncology, Palliative Medicine.    PRESENTER:   Dr. Segura    PATIENT SUMMARY:   The patient is a 42 y.o.  male presented to OSH for tunnel vision & L foot numbness. CTH showed L frontal lesion. Biopsy of lesion revealed path of oligodendroglioma. Elected to proceed with GTR of lesion.      5/26/23- MRI brain showing diffuse, non-enhancing tumor within temporal & parietal lobes, involving cingulate gyrus & crossing midline via infiltration of posterior corpus callosum   6/13/23- Biopsy/Dr Michele Schuler  7/27/23 - GTR/Dr Schuler & Dr Segura       Additionally, we reviewed previous medical and familial history of present illness, and recent lab results along with all available histopathologic and imaging studies.    IMAGING:   MRI Brain W WO Contrast (7/27/23):  Expected immediate postoperative changes of tumor resection.  Residual nonenhancing lesion.  This will serve as the initial postoperative surveillance scan. Debulking of the mass as detailed above with decreased intracranial mass effect.    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 He is scheduled for follow up on 8/18 with Dr. Mabry and 8/23 with Dr. Schuler.    National site-specific guidelines were discussed with respect to the case.     Tumor board is a meeting of clinicians from various specialty areas who evaluate and discuss patients for whom a multidisciplinary approach is being considered. Final determinations in the plan of care are those of the provider(s). The responsibility for follow up recommendations given during tumor board is that of the provider.     CONSULT NEEDED:     [] Neurosurgery    [] Hem/Onc    [] Rad/Onc         [] Endocrinology     [] Neuro-ophthalmology    [] Palliative Medicine      []  Other:       PRESENTATION AT CANCER CONFERENCE:         [] Prospective    [x] Retrospective     [] Follow-Up          [] Eligible for clinical trial/Clinical trial status:

## 2023-08-10 NOTE — OP NOTE
SUMMARY      Date of Procedure: 7/27/2023     Procedure: Procedure(s) (LRB):  CRANIOTOMY (Left)     Surgeon(s) and Role:     * Michele Schuler MD - Primary     * Man Segura MD - Co-Surgeon    Assisting Surgeon: None    Pre-Operative Diagnosis: Brain tumor [D49.6]    Post-Operative Diagnosis: Post-Op Diagnosis Codes:     * Brain tumor [D49.6]    Anesthesia: General    Technical Procedures Used:   - Left parietal craniotomy for resection of intraparenchymal mass with Vycor minimally invasive retractor  - Stereotactic neuronavigation using Stealth  - Use of operative microscope for brain dissection     Relevant History: Carlos Jacobson is a 42 y.o. male who presented with head pressure and feelings of anxiety that led to intracranial imaging and revelation of a nonenhancing lesion of the left parietal lobe, temporal lobe, and corpus callosum.  He underwent stereotactic biopsy.  Pathology resulted as grade 2 oligodendroglioma.  His case was presented at tumor board and under consultation with Neuro-Oncology, Radiation Oncology and neurosurgical colleagues it was felt that he would benefit from maximal safe resection of this lesion followed by adjuvant therapy. I offered a minimally invasive left parietal approach for likely subtotal resection. Risks, benefits and alternatives were discussed in detail. Risks discussed include but are not limited to bleeding, pain, infection, vision loss, weakness, numbness, incomplete resection, seizures, stroke, brain injury, need for more procedures, heart attack, blood clots, coma, and death.  After thorough discussion, the patient wished to proceed and signed the necessary consent forms.      Operative Details: The patient was taken to the operative room where anesthesia was induced and he was intubated by the anesthesia team. He was positioned with a large shoulder roll under the left shoulder with the head turned to the right on the operating table. His head was secured  in the [place] three point fixation thus exposing the left parietal area.  All pressure points were padded. A Kayla Hugger and SCDs were used. The neuronavigation was registered to the patient using facial contour tracing. The previously planned entry point was identified and marked. After clipping the area, a linear left parietal incision allowing access to the area of interest was marked. The area was prepped and draped in the standard sterile surgical fashion. After a proper time-out, the incision site was infiltrated with 1 % lidocaine with epinephrine. The skin incision was performed with a # 10 blade. The incision was carried down to the subgaleal space using monopolar electrocautery. The subgaleal plane was developed and the scalp flap was turned and held in place with retractors. The borders of the planned craniotomy were marked with the help of the neuronavigation wand. The drill with a isaias attachment was used to drill a isaias hole at the inferior aspect of the exposure. The previously marked craniotomy was turned using the drill with the craniotome attachment. The skull flap was elevated and removed. The dura over the previously identified access point was incised with a # 11 blade and opened in a curved manner and flapped towards the superior sagittal sinus.  A small corticotomy was then performed using bipolar cautery and an 11 blade.  The 28 mm Vycor sheath and obturator were advanced through the cortex under neuronavigation guidance into the desired target. The obturator was removed and the microscope was brought into the field. Under direct microscopic visualization, the edge of the tumor and normal tissue could be identified.  Several pieces of abnormal tissue were then collected and sent for pathologic examination.  The sheath was repositioned as necessary to assist with debulking and resection.  We proceeded with debulking the central portion of the lesion.  We began further resection posteriorly  defining the interface between grossly abnormal tissue and normal white matter.  We followed in this matter debulking centrally an alternate into the edge of the tumor as we rotated the retractor medially until the midline dura was encountered at the falx.  This allowed us to then work anteriorly where we noted the crossing fibers indicating the corpus callosum.  We continued working forward and inferiorly into the frontal and temporal lobe respectively.  After reaching the deepest aspects of the tumor in the frontal and temporal lobes we pulled the retractor backwards and examined medially at the corpus callosum.  The midline dura prevented us from achieving a good angle to cross far into the contralateral corpus callosum.  Attempt was made to coagulate and cut the falx however it was highly vascular and resulted in bleeding that was stopped with FloSeal, Gelfoam and pressure.  At this point we felt we had resected all tissue that was safely resectable.  The cavity was carefully inspected.  Hemostasis was obtained with Surgicel and fibrin glue.  The sheath was then slowly removed from the brain. The wound was copiously irrigated. The dural opening was closed with interrupted 4-0 Nurulon sutures .  A piece of Gelfoam was placed in the epidural space.  The plated bone flap was screwed to the skull. The retractors were removed and the galea was re-approximated with simple inverted interrupted 2-0 Vicryl sutures. The skin was closed with staples. The patient tolerated the procedure well. Needle, sponge and instrument counts were correct at the end of the case.      Complications: No    Estimated Blood Loss (EBL): 100 mL           Specimens:   Specimen (24h ago, onward)      None             Implants:   Implant Name Type Inv. Item Serial No.  Lot No. LRB No. Used Action   PINS SKULL ADULT [place] - [id number]  [facility] [id number]  1 Implanted and Explanted   PLATE BONE BUR HOLE COVER  10MM - [id number]  PLATE BONE BUR HOLE COVER 10MM  "[employer] STACI.  Left 2 Implanted   SCREW UN3 AXS SD 1.5X4MM - [id number]  SCREW UN3 AXS SD 1.5X4MM  "[employer] STACI.  Left 9 Implanted              Condition: Good    Disposition: PACU - hemodynamically stable.    Attestation: I was present and scrubbed for the entire procedure.

## 2023-08-11 ENCOUNTER — CLINICAL SUPPORT (OUTPATIENT)
Dept: NEUROSURGERY | Facility: CLINIC | Age: 43
End: 2023-08-11
Payer: COMMERCIAL

## 2023-08-11 DIAGNOSIS — Z98.890 S/P CRANIOTOMY: Primary | ICD-10-CM

## 2023-08-11 NOTE — PROGRESS NOTES
"Unsteady gait due to peripheral visual problems. Pt states he "walks cautiously". Equal bilateral strength. No numbness, weakness, tenderness, or tingling. No headaches or vomiting. Incision appears clean, dry, and intact. Incision was cleaned with chloraprep. Staples were removed. No complications, drainage. No concerns for provider. Pt was given next appt sheet and agreed to time/date/location.    "

## 2023-08-14 ENCOUNTER — PATIENT MESSAGE (OUTPATIENT)
Dept: HEMATOLOGY/ONCOLOGY | Facility: CLINIC | Age: 43
End: 2023-08-14
Payer: COMMERCIAL

## 2023-08-18 ENCOUNTER — TELEPHONE (OUTPATIENT)
Dept: PHARMACY | Facility: CLINIC | Age: 43
End: 2023-08-18
Payer: COMMERCIAL

## 2023-08-18 ENCOUNTER — OFFICE VISIT (OUTPATIENT)
Dept: NEUROSURGERY | Facility: CLINIC | Age: 43
End: 2023-08-18
Payer: COMMERCIAL

## 2023-08-18 ENCOUNTER — TELEPHONE (OUTPATIENT)
Dept: OPHTHALMOLOGY | Facility: CLINIC | Age: 43
End: 2023-08-18
Payer: COMMERCIAL

## 2023-08-18 VITALS
HEIGHT: 73 IN | DIASTOLIC BLOOD PRESSURE: 67 MMHG | BODY MASS INDEX: 24.43 KG/M2 | SYSTOLIC BLOOD PRESSURE: 109 MMHG | HEART RATE: 78 BPM | WEIGHT: 184.31 LBS

## 2023-08-18 DIAGNOSIS — C71.9 OLIGODENDROGLIOMA DETERMINED BY BIOPSY OF BRAIN: Primary | ICD-10-CM

## 2023-08-18 DIAGNOSIS — H53.9 VISION CHANGES: ICD-10-CM

## 2023-08-18 DIAGNOSIS — R56.9 SEIZURE: ICD-10-CM

## 2023-08-18 PROCEDURE — 1111F PR DISCHARGE MEDS RECONCILED W/ CURRENT OUTPATIENT MED LIST: ICD-10-PCS | Mod: CPTII,S$GLB,, | Performed by: PSYCHIATRY & NEUROLOGY

## 2023-08-18 PROCEDURE — 99999 PR PBB SHADOW E&M-EST. PATIENT-LVL III: CPT | Mod: PBBFAC,,, | Performed by: PSYCHIATRY & NEUROLOGY

## 2023-08-18 PROCEDURE — 3078F DIAST BP <80 MM HG: CPT | Mod: CPTII,S$GLB,, | Performed by: PSYCHIATRY & NEUROLOGY

## 2023-08-18 PROCEDURE — 3074F SYST BP LT 130 MM HG: CPT | Mod: CPTII,S$GLB,, | Performed by: PSYCHIATRY & NEUROLOGY

## 2023-08-18 PROCEDURE — 99999 PR PBB SHADOW E&M-EST. PATIENT-LVL III: ICD-10-PCS | Mod: PBBFAC,,, | Performed by: PSYCHIATRY & NEUROLOGY

## 2023-08-18 PROCEDURE — 3074F PR MOST RECENT SYSTOLIC BLOOD PRESSURE < 130 MM HG: ICD-10-PCS | Mod: CPTII,S$GLB,, | Performed by: PSYCHIATRY & NEUROLOGY

## 2023-08-18 PROCEDURE — 1159F PR MEDICATION LIST DOCUMENTED IN MEDICAL RECORD: ICD-10-PCS | Mod: CPTII,S$GLB,, | Performed by: PSYCHIATRY & NEUROLOGY

## 2023-08-18 PROCEDURE — 3008F PR BODY MASS INDEX (BMI) DOCUMENTED: ICD-10-PCS | Mod: CPTII,S$GLB,, | Performed by: PSYCHIATRY & NEUROLOGY

## 2023-08-18 PROCEDURE — 99215 PR OFFICE/OUTPT VISIT, EST, LEVL V, 40-54 MIN: ICD-10-PCS | Mod: S$GLB,,, | Performed by: PSYCHIATRY & NEUROLOGY

## 2023-08-18 PROCEDURE — 1111F DSCHRG MED/CURRENT MED MERGE: CPT | Mod: CPTII,S$GLB,, | Performed by: PSYCHIATRY & NEUROLOGY

## 2023-08-18 PROCEDURE — 3008F BODY MASS INDEX DOCD: CPT | Mod: CPTII,S$GLB,, | Performed by: PSYCHIATRY & NEUROLOGY

## 2023-08-18 PROCEDURE — 1159F MED LIST DOCD IN RCRD: CPT | Mod: CPTII,S$GLB,, | Performed by: PSYCHIATRY & NEUROLOGY

## 2023-08-18 PROCEDURE — 99215 OFFICE O/P EST HI 40 MIN: CPT | Mod: S$GLB,,, | Performed by: PSYCHIATRY & NEUROLOGY

## 2023-08-18 PROCEDURE — 3078F PR MOST RECENT DIASTOLIC BLOOD PRESSURE < 80 MM HG: ICD-10-PCS | Mod: CPTII,S$GLB,, | Performed by: PSYCHIATRY & NEUROLOGY

## 2023-08-18 RX ORDER — ONDANSETRON HYDROCHLORIDE 8 MG/1
TABLET, FILM COATED ORAL
Qty: 30 TABLET | Refills: 3 | Status: ACTIVE | OUTPATIENT
Start: 2023-08-18

## 2023-08-18 RX ORDER — TEMOZOLOMIDE 5 MG/1
15 CAPSULE ORAL DAILY
Qty: 126 CAPSULE | Refills: 0 | Status: ACTIVE | OUTPATIENT
Start: 2023-08-18 | End: 2023-10-11

## 2023-08-18 RX ORDER — TEMOZOLOMIDE 140 MG/1
140 CAPSULE ORAL DAILY
Qty: 42 CAPSULE | Refills: 0 | Status: ACTIVE | OUTPATIENT
Start: 2023-08-18 | End: 2023-10-11

## 2023-08-18 NOTE — PROGRESS NOTES
Subjective:       Patient ID: Carlos Jacobson is a 42 y.o. male.    Chief Complaint: No chief complaint on file.    HPI  5/2023: presented to urgent care for evaluation of tunnel vision and left foot numbness. CT head showed a left frontal lesion. MRI brain confirmed diffuse, non-enhancing tumor within the temporal and parietal lobes involving the cingulate gyrus and crossing the midline via diffuse infiltration of the posterior corpus callosum  6/13/2023: biopsy of lesion (Michele Schuler) with pathology consistent with oligodendroglioma, WHO grade 2, IDH- and 1p/19q- status pending  7/27/2023: resection of lesion with Dr. Segura consistent with oligodendroglioma, IDH-mutant and 1p/19q-codeleted, residual CNS WHO Grade 2    Presents today with his parents. Since surgery, Mr. Jacobson has been doing overall well. He occasionally has headaches but have improved since surgery. He reports that since the procedure, he has been having right peripheral vision issues. He also says that he is having trouble with right and left eye alignment. When he looks at objects closely, they are not clear and sometimes appear as double vision. He denies new Neuro symptoms such as tremors, weakness, numbness/tingling.    Past Medical History:   Diagnosis Date    Allergy     Anxiety     Brain tumor     Cancer     Clostridium difficile colitis 04/28/2014    GERD (gastroesophageal reflux disease)     Renal lesion       Current Outpatient Medications   Medication Sig Dispense Refill    ascorbic acid, vitamin C, (VITAMIN C) 1000 MG tablet Take 1,000 mg by mouth once daily.      EScitalopram oxalate (LEXAPRO) 10 MG tablet Take 1.5 tablets (15 mg total) by mouth once daily. 135 tablet 4    Lactobacillus acidophilus (PROBIOTIC ORAL) Take 1 tablet by mouth once daily.      levETIRAcetam (KEPPRA) 750 MG Tab Take 1 tablet (750 mg total) by mouth 2 (two) times daily. 180 tablet 3    levocetirizine (XYZAL) 5 MG tablet Take 5 mg by mouth every evening.       multivitamin (THERAGRAN) per tablet Take 1 tablet by mouth once daily.      famotidine (PEPCID) 20 MG tablet Take 1 tablet (20 mg total) by mouth 2 (two) times daily. for 14 days (Patient not taking: Reported on 8/18/2023) 28 tablet 0    ondansetron (ZOFRAN) 8 MG tablet Take 8mg (1 tab) 30-45 minutes prior to chemotherapy dose and every 8 hours as needed 30 tablet 3    oxyCODONE (ROXICODONE) 5 MG immediate release tablet Take 1 tablet (5 mg total) by mouth every 4 (four) hours as needed for Pain. (Patient not taking: Reported on 8/18/2023) 30 tablet 0    temozolomide (TEMODAR) 140 MG capsule Take 1 capsule (140 mg total) by mouth once daily Take as directed days 1-42 (6 weeks). Take on an empty stomach. with 1 other temozolomide prescription for 155 mg total. 42 capsule 0    temozolomide (TEMODAR) 5 MG capsule Take 3 capsules (15 mg total) by mouth once daily Take as directed days 1-42 (6 weeks). Take on an empty stomach. with 1 other temozolomide prescription for 155 mg total. 126 capsule 0     No current facility-administered medications for this visit.      Past Surgical History:   Procedure Laterality Date    BILATERAL INGUINAL HERNIA REPAIR Bilateral 1987    COLONOSCOPY  2014    CRANIOTOMY Left 7/27/2023    Procedure: CRANIOTOMY;  Surgeon: Michele Schuler MD;  Location: [address];  Service: Neurosurgery;  Laterality: Left;  LEFT PARIETAL CRANIOTOMY WITH RESECTION OF BRAIN MASS WITH DR. MIMS    STEREOTACTIC BIOPSY OF BRAIN Left 6/13/2023    Procedure: BIOPSY, BRAIN, STEREOTACTIC - LEFT PARIETAL BRAIN BIOPSY;  Surgeon: Michele Schuler MD;  Location: [facility];  Service: Neurosurgery;  Laterality: Left;      Family History   Problem Relation Age of Onset    Hyperlipidemia Mother     Hyperlipidemia Father     No Known Problems Sister     Hyperlipidemia Brother     Hyperlipidemia Maternal Uncle     Diabetes Maternal Grandmother     Lung cancer Maternal Grandmother       Social History     Tobacco Use     Smoking status: Some Days     Current packs/day: 0.00     Types: Cigars    Smokeless tobacco: Never    Tobacco comments:     Cigar once a year   Substance Use Topics    Alcohol use: No     Comment: occasionally    Drug use: No      Review of Systems   Constitutional:  Negative for fatigue, fever and unexpected weight change.   HENT:  Negative for dental problem and drooling.    Eyes:  Positive for photophobia and visual disturbance.   Respiratory:  Negative for cough and shortness of breath.    Cardiovascular:  Negative for chest pain and leg swelling.   Gastrointestinal:  Negative for abdominal pain and nausea.   Genitourinary:  Negative for difficulty urinating and dysuria.   Musculoskeletal:  Negative for arthralgias and back pain.   Neurological:  Positive for headaches. Negative for dizziness, seizures, weakness and numbness.   Psychiatric/Behavioral:  Negative for agitation and confusion.      KPS: 90      Objective:      Vitals:    08/18/23 1049   BP: 109/67   Pulse: 78          NEUROLOGICAL EXAMINATION:     MENTAL STATUS   Attention: normal. Concentration: normal.   Speech: speech is normal   Level of consciousness: alert    CRANIAL NERVES     CN II   Right visual field deficit: Right homonymous hemainopia.    CN III, IV, VI   Extraocular motions are normal.     CN V   Facial sensation intact.     CN VII   Facial expression full, symmetric.     CN VIII   CN VIII normal.     CN IX, X   CN IX normal.   CN X normal.     CN XI   CN XI normal.     CN XII   CN XII normal.     MOTOR EXAM     Strength   Strength 5/5 throughout.     SENSORY EXAM   Light touch normal.     GAIT AND COORDINATION     Gait  Gait: normal     Coordination   Finger to nose coordination: normal       Assessment:       Problem List Items Addressed This Visit          Oncology    Oligodendroglioma determined by biopsy of brain - Primary    Relevant Medications    temozolomide (TEMODAR) 5 MG capsule    temozolomide (TEMODAR) 140 MG capsule     ondansetron (ZOFRAN) 8 MG tablet    Other Relevant Orders    Physician communication order    Physician communication order    Physician communication order    CBC W/ AUTO DIFFERENTIAL    Comprehensive Metabolic Panel     Other Visit Diagnoses       Vision changes        Relevant Orders    Ambulatory referral/consult to Ophthalmology    Seizure                  Plan:       Carlos Jacobson is a 42 y.o. male with an oligodendroglioma, WHO grade 2 s/p biopsy presenting for treatment recommendations. Tumor diagnosis was heralded by left foot numbness and tunnel vision. Current symptoms include right homonymous hemianopsia and blurry vision.    Oligodendroglioma  Patient had additional resection and debulking with Dr. Segura on 07/27/2023 with pathology returning as oligodendroglioma, WHO grade 2, I[facility]-mutant. Plan for chemoradiation followed by adjuvant TMZ x12 months. We discussed timing, duration and side effects (including fatigue, nausea, constipation, rash, hair loss, bone marrow suppression, liver dysfunction) of temozolomide.      I will prescribe temozolomide today at a dose of 75 milligrams/meter squared per night.  Patient instructed to not start this medication until the night prior to radiation treatment.  - TMZ 75 milligrams/meter squared per night equal to a dose of 195mg nightly  - Pre-treat with ondansetron 8mg 30-45 min prior to chemotherapy dose. Take any regular nighttime medications with the ondansetron.   - Take chemotherapy just prior to bedtime.   - Do not eat for about 2-3 hours prior to taking the chemotherapy.  - Start Docusate daily starting 2-3 days prior to chemotherapy and laxatives every 2-3 days as needed.  - CBC weekly and CMP at week 3 and 6 of chemoradiation    Vision changes  Possibly related to recent steroid use. Referral to Neuro-Ophthalmology     Seizure  Had prior episodic aphasia for 30 seconds at a time with quick return to baseline is a focal seizure. Other potential etiologies  include fatigue, anxiety. Continue levetiracetam 750mg bid and monitor.       45 minutes of total time spent on the encounter, which includes face to face time and non-face to face time preparing to see the patient (eg, review of tests), Obtaining and/or reviewing separately obtained history, Documenting clinical information in the electronic or other health record, Independently interpreting results (not separately reported) and communicating results to the patient/family/caregiver, or Care coordination (not separately reported).     Discussed with Dr. Mabry.    Laurita Montaño MD   Hematology and Oncology Fellow, PGY V

## 2023-08-18 NOTE — TELEPHONE ENCOUNTER
Hemanth, this is Iron Hardy, clinical pharmacist with [facility] that is part of your care team.  We have begun working on your prescription that your doctor has sent us. Our next steps include:     Working with your insurance company to obtain approval for your medication  Working with you to ensure your medication is affordable     We will be calling you along the way with updates on your medication but if you have any concerns or receive information that you would like to discuss please reach us at [phone].    Welcome call outcome: Left voicemail

## 2023-08-18 NOTE — TELEPHONE ENCOUNTER
----- Message from Paige Cueto sent at 8/18/2023 12:18 PM CDT -----  Type: Appointment Request    Name of Caller: CLAUDINE BAEZ [[id number]]  When is the first available appointment? Do not have access  Reason for Visit:  Neuro Ophthalmology. oligodendroglioma w vision changes  Best Call Back Number: [phone]  Additional Information: New patient, has internal referral to provider.  Requests first available.

## 2023-08-21 ENCOUNTER — SPECIALTY PHARMACY (OUTPATIENT)
Dept: PHARMACY | Facility: CLINIC | Age: 43
End: 2023-08-21

## 2023-08-21 ENCOUNTER — DOCUMENTATION ONLY (OUTPATIENT)
Dept: HEMATOLOGY/ONCOLOGY | Facility: CLINIC | Age: 43
End: 2023-08-21
Payer: COMMERCIAL

## 2023-08-21 DIAGNOSIS — D49.6 BRAIN TUMOR: ICD-10-CM

## 2023-08-21 DIAGNOSIS — C71.9 OLIGODENDROGLIOMA DETERMINED BY BIOPSY OF BRAIN: Primary | ICD-10-CM

## 2023-08-21 NOTE — TELEPHONE ENCOUNTER
PA approved for [facility]. [facility] is OON. Patient must use [facility]. Patient is aware and was provided with their phone number ([phone]). No questions/concerns. Routing Rx and closing out of [facility]'s services.

## 2023-08-23 ENCOUNTER — HOSPITAL ENCOUNTER (OUTPATIENT)
Dept: RADIOLOGY | Facility: HOSPITAL | Age: 43
Discharge: HOME | End: 2023-08-23
Attending: NEUROLOGICAL SURGERY
Payer: COMMERCIAL

## 2023-08-23 ENCOUNTER — OFFICE VISIT (OUTPATIENT)
Dept: NEUROSURGERY | Facility: CLINIC | Age: 43
End: 2023-08-23
Payer: COMMERCIAL

## 2023-08-23 VITALS
HEIGHT: 73 IN | BODY MASS INDEX: 24.43 KG/M2 | SYSTOLIC BLOOD PRESSURE: 110 MMHG | WEIGHT: 184.31 LBS | HEART RATE: 96 BPM | DIASTOLIC BLOOD PRESSURE: 74 MMHG | RESPIRATION RATE: 18 BRPM

## 2023-08-23 DIAGNOSIS — C71.9 OLIGODENDROGLIOMA DETERMINED BY BIOPSY OF BRAIN: Primary | ICD-10-CM

## 2023-08-23 DIAGNOSIS — D49.6 BRAIN TUMOR: ICD-10-CM

## 2023-08-23 PROCEDURE — 99024 PR POST-OP FOLLOW-UP VISIT: ICD-10-PCS | Mod: S$GLB,,, | Performed by: NEUROLOGICAL SURGERY

## 2023-08-23 PROCEDURE — 3074F PR MOST RECENT SYSTOLIC BLOOD PRESSURE < 130 MM HG: ICD-10-PCS | Mod: CPTII,S$GLB,, | Performed by: NEUROLOGICAL SURGERY

## 2023-08-23 PROCEDURE — 70450 CT HEAD/BRAIN W/O DYE: CPT | Mod: TC,PO

## 2023-08-23 PROCEDURE — 99024 POSTOP FOLLOW-UP VISIT: CPT | Mod: S$GLB,,, | Performed by: NEUROLOGICAL SURGERY

## 2023-08-23 PROCEDURE — 3078F PR MOST RECENT DIASTOLIC BLOOD PRESSURE < 80 MM HG: ICD-10-PCS | Mod: CPTII,S$GLB,, | Performed by: NEUROLOGICAL SURGERY

## 2023-08-23 PROCEDURE — 3074F SYST BP LT 130 MM HG: CPT | Mod: CPTII,S$GLB,, | Performed by: NEUROLOGICAL SURGERY

## 2023-08-23 PROCEDURE — 70450 CT HEAD/BRAIN W/O DYE: CPT | Mod: 26,,, | Performed by: RADIOLOGY

## 2023-08-23 PROCEDURE — 70450 CT HEAD WITHOUT CONTRAST: ICD-10-PCS | Mod: 26,,, | Performed by: RADIOLOGY

## 2023-08-23 PROCEDURE — 3078F DIAST BP <80 MM HG: CPT | Mod: CPTII,S$GLB,, | Performed by: NEUROLOGICAL SURGERY

## 2023-08-23 NOTE — PROGRESS NOTES
Neurosurgery History & Physical    Patient ID: Carlos Jacobson is a 43 y.o. male.    Chief Complaint   Patient presents with    Post-op Evaluation     4 week POV with CT. States still having trouble with coordination when walking down stairs.  Only able to read with one eye open.      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  He has been doing well overall.  He continues to have a right sided visual field cut.      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  Patient returns today for a general postoperative visit, review of pathology, and discussion of next steps.  The patient denies any issues with his wound.  He did see Dr. Segura this morning at the request of Dr. Mabry with Neuro-Oncology.     History of Present Illness 6/13/2023: Mr. Jacobson is a 42 year old male who presents today for evaluation of recent brain imaging. Over the last 6 months, he reports pressure in his head and tinnitus at times. He has associated these symptoms with anxiety which he has struggled with over the last few years. He reports two episodes of left foot numbness and tunnel vision in recent weeks which lead him to seek evaluation at the urgent care. A CT was performed there which showed a brain lesion and PCP ordered MRI brain and CT C/A/P.      He feels that he has trouble with coordination when walking down stairs. Otherwise denies other neurologic symptom.      He has seen Urology regarding CT CAP which demonstrates small left renal lesion. They recommended follow up in 3 months for monitoring.     Review of Systems  Denies F/C/N/V    Past Medical History:   Diagnosis Date    Allergy     Anxiety     Brain tumor     Cancer     Clostridium difficile colitis 04/28/2014    GERD  (gastroesophageal reflux disease)     Renal lesion      Social History     Socioeconomic History    Marital status: Single   Tobacco Use    Smoking status: Some Days     Types: Cigars    Smokeless tobacco: Never    Tobacco comments:     Cigar once a year   Substance and Sexual Activity    Alcohol use: No     Comment: occasionally    Drug use: No    Sexual activity: Never     Social Determinants of Health     Financial Resource Strain: High Risk (7/27/2023)    Overall Financial Resource Strain (CARDIA)     Difficulty of Paying Living Expenses: Very hard   Food Insecurity: No Food Insecurity (7/27/2023)    Hunger Vital Sign     Worried About Running Out of Food in the Last Year: Never true     Ran Out of Food in the Last Year: Never true   Transportation Needs: No Transportation Needs (7/27/2023)    PRAPARE - Transportation     Lack of Transportation (Medical): No     Lack of Transportation (Non-Medical): No   Physical Activity: Insufficiently Active (7/27/2023)    Exercise Vital Sign     Days of Exercise per Week: 3 days     Minutes of Exercise per Session: 30 min   Stress: Unknown (7/27/2023)    [place] [employer] - Occupational Stress Questionnaire     Feeling of Stress : Patient refused   Social Connections: Unknown (7/27/2023)    Social Connection and Isolation Panel [NHANES]     Frequency of Communication with Friends and Family: Three times a week     Attends Judaism Services: More than 4 times per year     Attends Club or Organization Meetings: Patient refused     Marital Status: Never    Housing Stability: Unknown (7/27/2023)    Housing Stability Vital Sign     Unable to Pay for Housing in the Last Year: No     Unstable Housing in the Last Year: No     Family History   Problem Relation Age of Onset    Hyperlipidemia Mother     Hyperlipidemia Father     No Known Problems Sister     Hyperlipidemia Brother     Hyperlipidemia Maternal Uncle     Diabetes Maternal Grandmother     Lung  cancer Maternal Grandmother      Review of patient's allergies indicates:  No Known Allergies    Current Outpatient Medications:     ascorbic acid, vitamin C, (VITAMIN C) 1000 MG tablet, Take 1,000 mg by mouth once daily., Disp: , Rfl:     EScitalopram oxalate (LEXAPRO) 10 MG tablet, Take 1.5 tablets (15 mg total) by mouth once daily. (Patient taking differently: Take 5 mg by mouth once daily.), Disp: 135 tablet, Rfl: 4    Lactobacillus acidophilus (PROBIOTIC ORAL), Take 1 tablet by mouth once daily., Disp: , Rfl:     levETIRAcetam (KEPPRA) 750 MG Tab, Take 1 tablet (750 mg total) by mouth 2 (two) times daily., Disp: 180 tablet, Rfl: 3    levocetirizine (XYZAL) 5 MG tablet, Take 5 mg by mouth every evening., Disp: , Rfl:     multivitamin (THERAGRAN) per tablet, Take 1 tablet by mouth once daily., Disp: , Rfl:     ondansetron (ZOFRAN) 8 MG tablet, Take 8mg (1 tab) 30-45 minutes prior to chemotherapy dose and every 8 hours as needed (Patient not taking: Reported on 10/20/2023), Disp: 30 tablet, Rfl: 3    oxyCODONE (ROXICODONE) 5 MG immediate release tablet, Take 1 tablet (5 mg total) by mouth every 4 (four) hours as needed for Pain. (Patient not taking: Reported on 10/20/2023), Disp: 30 tablet, Rfl: 0    famotidine (PEPCID) 20 MG tablet, Take 1 tablet (20 mg total) by mouth 2 (two) times daily. for 14 days, Disp: 28 tablet, Rfl: 0    memantine (NAMENDA TITRATION PACK) tablet pack, Follow package directions., Disp: 1 packet, Rfl: 0    temozolomide (TEMODAR) 140 MG capsule, TAKE ONE 140MG CAPSULE BY MOUTH  WITH THREE 5MG CAPSULES (TOTAL  DOSE 155MG) ONCE DAILY ON AN  EMPTY STOMACH FOR 42 DAYS AS  DIRECTED (Patient not taking: Reported on 10/20/2023.), Disp: 21 capsule, Rfl: 0    temozolomide (TEMODAR) 5 MG capsule, TAKE THREE 5MG CAPSULES BY MOUTH WITH ONE 140MG CAPSULE (TOTAL  DOSE 155MG) ONCE DAILY ON AN  EMPTY STOMACH FOR 42 DAYS AS  DIRECTED (Patient not taking: Reported on 10/20/2023.), Disp: 63 capsule, Rfl:  "0  Blood pressure 110/74, pulse 96, resp. rate 18, height 6' 1" (1.854 m), weight 83.6 kg (184 lb 4.9 oz).      Neurologic Exam    AAOx4, NAD  Right sided homonymous hemianopsia    Strength   Deltoids Triceps Biceps Wrist Extension Wrist Flexion Hand    Upper: R 5/5 5/5 5/5 5/5 5/5 5/5     L 5/5 5/5 5/5 5/5 5/5 5/5       Iliopsoas Quadriceps Knee  Flexion Tibialis  anterior Gastro- cnemius EHL   Lower: R 5/5 5/5 5/5 5/5 5/5 5/5     L 5/5 5/5 5/5 5/5 5/5 5/5      Sensation grossly intact to light touch in bilateral upper and lower extremities      Imaging:  Postoperative CT scan of the head reviewed showing no acute changes.    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    We will send him for formal visual field testing with Dr. Ellison.  Patient will call to schedule appointment after post radiation MRI of the brain with and without contrast.  "

## 2023-08-24 ENCOUNTER — HOSPITAL ENCOUNTER (OUTPATIENT)
Dept: RADIOLOGY | Facility: HOSPITAL | Age: 43
Discharge: HOME | End: 2023-08-24
Attending: RADIOLOGY
Payer: COMMERCIAL

## 2023-08-24 ENCOUNTER — TELEPHONE (OUTPATIENT)
Dept: NEUROSURGERY | Facility: CLINIC | Age: 43
End: 2023-08-24
Payer: COMMERCIAL

## 2023-08-24 DIAGNOSIS — C71.9 OLIGODENDROGLIOMA DETERMINED BY BIOPSY OF BRAIN: ICD-10-CM

## 2023-08-24 DIAGNOSIS — C71.9 OLIGODENDROGLIOMA DETERMINED BY BIOPSY OF BRAIN: Primary | ICD-10-CM

## 2023-08-24 DIAGNOSIS — D49.6 BRAIN TUMOR: ICD-10-CM

## 2023-08-24 PROCEDURE — 25500020 PHARM REV CODE 255: Mod: PO | Performed by: RADIOLOGY

## 2023-08-24 PROCEDURE — 70553 MRI BRAIN STEM W/O & W/DYE: CPT | Mod: TC,PO

## 2023-08-24 PROCEDURE — 70553 MRI BRAIN W WO CONTRAST: ICD-10-PCS | Mod: 26,,, | Performed by: RADIOLOGY

## 2023-08-24 PROCEDURE — A9585 GADOBUTROL INJECTION: HCPCS | Mod: PO | Performed by: RADIOLOGY

## 2023-08-24 PROCEDURE — 70553 MRI BRAIN STEM W/O & W/DYE: CPT | Mod: 26,,, | Performed by: RADIOLOGY

## 2023-08-24 RX ORDER — GADOBUTROL 604.72 MG/ML
8 INJECTION INTRAVENOUS
Status: COMPLETED | OUTPATIENT
Start: 2023-08-24 | End: 2023-08-24

## 2023-08-24 RX ADMIN — GADOBUTROL 8 ML: 604.72 INJECTION INTRAVENOUS at 12:08

## 2023-08-29 ENCOUNTER — OFFICE VISIT (OUTPATIENT)
Dept: RADIATION ONCOLOGY | Facility: CLINIC | Age: 43
End: 2023-08-29
Payer: COMMERCIAL

## 2023-08-29 ENCOUNTER — HOSPITAL ENCOUNTER (OUTPATIENT)
Dept: RADIATION THERAPY | Facility: HOSPITAL | Age: 43
Discharge: HOME | End: 2023-08-29
Attending: INTERNAL MEDICINE
Payer: COMMERCIAL

## 2023-08-29 VITALS
DIASTOLIC BLOOD PRESSURE: 71 MMHG | TEMPERATURE: 98 F | SYSTOLIC BLOOD PRESSURE: 108 MMHG | BODY MASS INDEX: 24.34 KG/M2 | RESPIRATION RATE: 16 BRPM | OXYGEN SATURATION: 96 % | HEART RATE: 60 BPM | WEIGHT: 183.63 LBS | HEIGHT: 73 IN

## 2023-08-29 DIAGNOSIS — C71.9 OLIGODENDROGLIOMA DETERMINED BY BIOPSY OF BRAIN: Primary | ICD-10-CM

## 2023-08-29 PROCEDURE — 3074F SYST BP LT 130 MM HG: CPT | Mod: CPTII,S$GLB,, | Performed by: STUDENT IN AN ORGANIZED HEALTH CARE EDUCATION/TRAINING PROGRAM

## 2023-08-29 PROCEDURE — 99214 PR OFFICE/OUTPT VISIT, EST, LEVL IV, 30-39 MIN: ICD-10-PCS | Mod: S$GLB,,, | Performed by: STUDENT IN AN ORGANIZED HEALTH CARE EDUCATION/TRAINING PROGRAM

## 2023-08-29 PROCEDURE — 3078F PR MOST RECENT DIASTOLIC BLOOD PRESSURE < 80 MM HG: ICD-10-PCS | Mod: CPTII,S$GLB,, | Performed by: STUDENT IN AN ORGANIZED HEALTH CARE EDUCATION/TRAINING PROGRAM

## 2023-08-29 PROCEDURE — 1160F RVW MEDS BY RX/DR IN RCRD: CPT | Mod: CPTII,S$GLB,, | Performed by: STUDENT IN AN ORGANIZED HEALTH CARE EDUCATION/TRAINING PROGRAM

## 2023-08-29 PROCEDURE — 1159F MED LIST DOCD IN RCRD: CPT | Mod: CPTII,S$GLB,, | Performed by: STUDENT IN AN ORGANIZED HEALTH CARE EDUCATION/TRAINING PROGRAM

## 2023-08-29 PROCEDURE — 3008F BODY MASS INDEX DOCD: CPT | Mod: CPTII,S$GLB,, | Performed by: STUDENT IN AN ORGANIZED HEALTH CARE EDUCATION/TRAINING PROGRAM

## 2023-08-29 PROCEDURE — 3078F DIAST BP <80 MM HG: CPT | Mod: CPTII,S$GLB,, | Performed by: STUDENT IN AN ORGANIZED HEALTH CARE EDUCATION/TRAINING PROGRAM

## 2023-08-29 PROCEDURE — 99999 PR PBB SHADOW E&M-EST. PATIENT-LVL IV: CPT | Mod: PBBFAC,,, | Performed by: STUDENT IN AN ORGANIZED HEALTH CARE EDUCATION/TRAINING PROGRAM

## 2023-08-29 PROCEDURE — 3008F PR BODY MASS INDEX (BMI) DOCUMENTED: ICD-10-PCS | Mod: CPTII,S$GLB,, | Performed by: STUDENT IN AN ORGANIZED HEALTH CARE EDUCATION/TRAINING PROGRAM

## 2023-08-29 PROCEDURE — 77014 PR  CT GUIDANCE PLACEMENT RAD THERAPY FIELDS: ICD-10-PCS | Mod: 26,,, | Performed by: STUDENT IN AN ORGANIZED HEALTH CARE EDUCATION/TRAINING PROGRAM

## 2023-08-29 PROCEDURE — 99999 PR PBB SHADOW E&M-EST. PATIENT-LVL IV: ICD-10-PCS | Mod: PBBFAC,,, | Performed by: STUDENT IN AN ORGANIZED HEALTH CARE EDUCATION/TRAINING PROGRAM

## 2023-08-29 PROCEDURE — 77014 PR  CT GUIDANCE PLACEMENT RAD THERAPY FIELDS: CPT | Mod: 26,,, | Performed by: STUDENT IN AN ORGANIZED HEALTH CARE EDUCATION/TRAINING PROGRAM

## 2023-08-29 PROCEDURE — 1159F PR MEDICATION LIST DOCUMENTED IN MEDICAL RECORD: ICD-10-PCS | Mod: CPTII,S$GLB,, | Performed by: STUDENT IN AN ORGANIZED HEALTH CARE EDUCATION/TRAINING PROGRAM

## 2023-08-29 PROCEDURE — 3074F PR MOST RECENT SYSTOLIC BLOOD PRESSURE < 130 MM HG: ICD-10-PCS | Mod: CPTII,S$GLB,, | Performed by: STUDENT IN AN ORGANIZED HEALTH CARE EDUCATION/TRAINING PROGRAM

## 2023-08-29 PROCEDURE — 99214 OFFICE O/P EST MOD 30 MIN: CPT | Mod: S$GLB,,, | Performed by: STUDENT IN AN ORGANIZED HEALTH CARE EDUCATION/TRAINING PROGRAM

## 2023-08-29 PROCEDURE — 1160F PR REVIEW ALL MEDS BY PRESCRIBER/CLIN PHARMACIST DOCUMENTED: ICD-10-PCS | Mod: CPTII,S$GLB,, | Performed by: STUDENT IN AN ORGANIZED HEALTH CARE EDUCATION/TRAINING PROGRAM

## 2023-08-29 RX ORDER — MEMANTINE HYDROCHLORIDE 5 MG-10 MG
KIT ORAL
Qty: 1 PACKET | Refills: 0 | Status: SHIPPED | OUTPATIENT
Start: 2023-08-29 | End: 2023-10-30 | Stop reason: CLARIF

## 2023-08-29 NOTE — PROGRESS NOTES
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 Postop MRI showed residual disease.    On maintenance Keppra for a preoperative seizure-like episode.     ECOG 1, has residual right homonymous hemianopia, pending neuro-ophthalmology evaluation     Plan     I discussed partial brain radiation with concurrent TMZ given to a dose of 54Gy/30fx delivered M-F using IMRT     Risks, benefits, and side effects of radiotherapy were discussed.   Side effects include but are not limited to fatigue, erythema, alopecia, headaches, and seizures.    Patient signed informed consents for each scenario in case radiation is used for therapy    - CT SIM today with Aquaplast mask  - plan to start chemoRT Tuesday 9/5/2023  - will send memantine for neurocognitive function preservation  - pending Neuro-Ophthalmology evaluation     Interval history:     6/29/2023 initial eval by Dr Shelton  "Patient classified as high-risk, low-grade disease (age >40, >6cm, crossing midline), and thus a candidate for concurrent TMZ-radiation therapy followed by adjuvant TMZ for improved outcomes.  Case discussed with Dr. Mabry.  She will obtain fMRI and " "present at upcoming Neuro-Onc TB to see if further resection/debulking is possible. Additionally, jjrk-[id number] with Dr. Riley of [facility] will be performed later today."     7/27/2023 left parietal craniotomy with resection (Dr Segura)  Unifocal oligodendroglioma, IDH1-mutant and 1p/19q-codeleted, residual WHO Grade 2    7/27/2023 postop MRI brain #1   Debulking of the mass as detailed above with decreased intracranial mass effect, residual nonenhancing lesion    8/10/2023 CNS TB    8/18/2023 [facility] eval (Dr Mabry)   Plan for chemoradiation followed by adjuvant TMZ x12 months.   Patient instructed to not start this medication until the night prior to radiation treatment.   Referral to Neuro-Ophthalmology     8/24/2023 postop MRI brain #2  interval expansion of the resection cavity  initial tumor did not demonstrate any significant enhancement  infiltrative nonenhancing tumor crosses corpus callosum to the right, extends into anteromedial left temporal lobe , and superior posteromedial left frontal lobe      Subjective:   In clinic the patient is accompanied by his parents.  He reports feeling well overall. His main issue is subjective right peripheral vision issues. He denies new recent seizure like episodes, other focal neurological deficits or headaches or issues with the surgical site    The patient denies other major complaints     The patient denies any history of radiation therapy, implantable cardiac devices, or connective tissue disease.     Social history  He is a  and goes by "Father Edison"     Family History   Problem Relation Age of Onset    Hyperlipidemia Mother     Hyperlipidemia Father     No Known Problems Sister     Hyperlipidemia Brother     Hyperlipidemia Maternal Uncle     Diabetes Maternal Grandmother     Lung cancer Maternal Grandmother          Current Outpatient Medications on File Prior to Visit   Medication Sig Dispense Refill    ascorbic acid, vitamin C, (VITAMIN C) 1000 MG tablet Take " 1,000 mg by mouth once daily.      EScitalopram oxalate (LEXAPRO) 10 MG tablet Take 1.5 tablets (15 mg total) by mouth once daily. 135 tablet 4    famotidine (PEPCID) 20 MG tablet Take 1 tablet (20 mg total) by mouth 2 (two) times daily. for 14 days (Patient not taking: Reported on 8/18/2023) 28 tablet 0    Lactobacillus acidophilus (PROBIOTIC ORAL) Take 1 tablet by mouth once daily.      levETIRAcetam (KEPPRA) 750 MG Tab Take 1 tablet (750 mg total) by mouth 2 (two) times daily. 180 tablet 3    levocetirizine (XYZAL) 5 MG tablet Take 5 mg by mouth every evening.      multivitamin (THERAGRAN) per tablet Take 1 tablet by mouth once daily.      ondansetron (ZOFRAN) 8 MG tablet Take 8mg (1 tab) 30-45 minutes prior to chemotherapy dose and every 8 hours as needed 30 tablet 3    oxyCODONE (ROXICODONE) 5 MG immediate release tablet Take 1 tablet (5 mg total) by mouth every 4 (four) hours as needed for Pain. 30 tablet 0    temozolomide (TEMODAR) 140 MG capsule Take 1 capsule (140 mg total) by mouth once daily Take as directed days 1-42 (6 weeks). Take on an empty stomach. with 1 other temozolomide prescription for 155 mg total. 42 capsule 0    temozolomide (TEMODAR) 5 MG capsule Take 3 capsules (15 mg total) by mouth once daily Take as directed days 1-42 (6 weeks). Take on an empty stomach. with 1 other temozolomide prescription for 155 mg total. 126 capsule 0     No current facility-administered medications on file prior to visit.         Review of patient's allergies indicates:  No Known Allergies    Past Surgical History:   Procedure Laterality Date    BILATERAL INGUINAL HERNIA REPAIR Bilateral 1987    COLONOSCOPY  2014    CRANIOTOMY Left 7/27/2023    Procedure: CRANIOTOMY;  Surgeon: Michele Schuler MD;  Location: [facility] OR [address] [place] [address];  Service: Neurosurgery;  Laterality: Left;  LEFT PARIETAL CRANIOTOMY WITH RESECTION OF BRAIN MASS WITH DR. MIMS    STEREOTACTIC BIOPSY OF BRAIN Left 6/13/2023    Procedure: BIOPSY, BRAIN,  STEREOTACTIC - LEFT PARIETAL BRAIN BIOPSY;  Surgeon: Michele Schuler MD;  Location: [facility];  Service: Neurosurgery;  Laterality: Left;       Past Medical History:   Diagnosis Date    Allergy     Anxiety     Brain tumor     Cancer     Clostridium difficile colitis 04/28/2014    GERD (gastroesophageal reflux disease)     Renal lesion           Review of Systems   Negative unless as above     Objective:      Physical Exam  Vitals reviewed.     Constitutional:       Appearance: Normal appearance.    HENT:      Head: Well healed left craniotomy scar, no alopecia  Eyes:      Conjunctiva/sclera: Conjunctivae normal.   Pulmonary:      Effort: Pulmonary effort is normal.   Musculoskeletal:         General: Normal range of motion.    Neurological:      General: right homonymous hemianopia, otherwise no other gross CN deficits noted ; normal balance and gait     Mental Status: Alert and oriented  Psychiatric:         Mood and Affect: Mood normal.         Behavior: Behavior normal.      Imaging: I have personally reviewed the patient's available images and reports and summarized pertinent findings above in HPI.      Pathology: I have personally reviewed the patient's available pathology and summarized pertinent findings above in HPI.      I spent approximately 30 minutes reviewing the available records and evaluating the patient, out of which over 50% of the time was spent face to face with the patient in counseling and coordinating this patient's care.     Thank you for the opportunity to care for this patient. Please do not hesitate to contact me with any questions.     Dhaval Leo MD/PhD

## 2023-09-01 ENCOUNTER — PATIENT MESSAGE (OUTPATIENT)
Dept: RADIATION ONCOLOGY | Facility: CLINIC | Age: 43
End: 2023-09-01
Payer: COMMERCIAL

## 2023-09-01 ENCOUNTER — HOSPITAL ENCOUNTER (OUTPATIENT)
Dept: RADIATION THERAPY | Facility: HOSPITAL | Age: 43
Discharge: HOME | End: 2023-09-01
Attending: RADIOLOGY
Payer: COMMERCIAL

## 2023-09-01 PROCEDURE — 77301 RADIOTHERAPY DOSE PLAN IMRT: CPT | Mod: 26,,, | Performed by: STUDENT IN AN ORGANIZED HEALTH CARE EDUCATION/TRAINING PROGRAM

## 2023-09-01 PROCEDURE — 77301 PR  INTEN MOD RADIOTHER PLAN W/DOSE VOL HIST: ICD-10-PCS | Mod: 26,,, | Performed by: STUDENT IN AN ORGANIZED HEALTH CARE EDUCATION/TRAINING PROGRAM

## 2023-09-01 PROCEDURE — 77301 RADIOTHERAPY DOSE PLAN IMRT: CPT | Mod: TC | Performed by: STUDENT IN AN ORGANIZED HEALTH CARE EDUCATION/TRAINING PROGRAM

## 2023-09-02 PROCEDURE — 77470 PR  SPECIAL RADIATION TREATMENT: ICD-10-PCS | Mod: 26,59,, | Performed by: STUDENT IN AN ORGANIZED HEALTH CARE EDUCATION/TRAINING PROGRAM

## 2023-09-02 PROCEDURE — 77470 SPECIAL RADIATION TREATMENT: CPT | Mod: 26,59,, | Performed by: STUDENT IN AN ORGANIZED HEALTH CARE EDUCATION/TRAINING PROGRAM

## 2023-09-02 PROCEDURE — 77338 PR  MLC IMRT DESIGN & CONSTRUCTION PER IMRT PLAN: ICD-10-PCS | Mod: 26,,, | Performed by: STUDENT IN AN ORGANIZED HEALTH CARE EDUCATION/TRAINING PROGRAM

## 2023-09-02 PROCEDURE — 77300 RADIATION THERAPY DOSE PLAN: CPT | Mod: 26,,, | Performed by: STUDENT IN AN ORGANIZED HEALTH CARE EDUCATION/TRAINING PROGRAM

## 2023-09-02 PROCEDURE — 77300 RADIATION THERAPY DOSE PLAN: CPT | Mod: TC | Performed by: STUDENT IN AN ORGANIZED HEALTH CARE EDUCATION/TRAINING PROGRAM

## 2023-09-02 PROCEDURE — 77300 PR RADIATION THERAPY,DOSIMETRY PLAN: ICD-10-PCS | Mod: 26,,, | Performed by: STUDENT IN AN ORGANIZED HEALTH CARE EDUCATION/TRAINING PROGRAM

## 2023-09-02 PROCEDURE — 77338 DESIGN MLC DEVICE FOR IMRT: CPT | Mod: TC | Performed by: STUDENT IN AN ORGANIZED HEALTH CARE EDUCATION/TRAINING PROGRAM

## 2023-09-02 PROCEDURE — 77470 SPECIAL RADIATION TREATMENT: CPT | Mod: 59,TC | Performed by: STUDENT IN AN ORGANIZED HEALTH CARE EDUCATION/TRAINING PROGRAM

## 2023-09-02 PROCEDURE — 77338 DESIGN MLC DEVICE FOR IMRT: CPT | Mod: 26,,, | Performed by: STUDENT IN AN ORGANIZED HEALTH CARE EDUCATION/TRAINING PROGRAM

## 2023-09-06 ENCOUNTER — DOCUMENTATION ONLY (OUTPATIENT)
Dept: RADIATION ONCOLOGY | Facility: CLINIC | Age: 43
End: 2023-09-06
Payer: COMMERCIAL

## 2023-09-06 PROCEDURE — 77014 PR  CT GUIDANCE PLACEMENT RAD THERAPY FIELDS: CPT | Mod: 26,,, | Performed by: STUDENT IN AN ORGANIZED HEALTH CARE EDUCATION/TRAINING PROGRAM

## 2023-09-06 PROCEDURE — 77014 PR  CT GUIDANCE PLACEMENT RAD THERAPY FIELDS: ICD-10-PCS | Mod: 26,,, | Performed by: STUDENT IN AN ORGANIZED HEALTH CARE EDUCATION/TRAINING PROGRAM

## 2023-09-06 PROCEDURE — 77386 HC IMRT, COMPLEX: CPT | Performed by: STUDENT IN AN ORGANIZED HEALTH CARE EDUCATION/TRAINING PROGRAM

## 2023-09-06 NOTE — PLAN OF CARE
Completed 1 of 30 outpatient radiation treatments to the brain without difficulty.  All questions and concerns addressed by radiation team this visit.

## 2023-09-07 PROCEDURE — 77014 PR  CT GUIDANCE PLACEMENT RAD THERAPY FIELDS: CPT | Mod: 26,,, | Performed by: STUDENT IN AN ORGANIZED HEALTH CARE EDUCATION/TRAINING PROGRAM

## 2023-09-07 PROCEDURE — 77386 HC IMRT, COMPLEX: CPT | Performed by: STUDENT IN AN ORGANIZED HEALTH CARE EDUCATION/TRAINING PROGRAM

## 2023-09-07 PROCEDURE — 77014 PR  CT GUIDANCE PLACEMENT RAD THERAPY FIELDS: ICD-10-PCS | Mod: 26,,, | Performed by: STUDENT IN AN ORGANIZED HEALTH CARE EDUCATION/TRAINING PROGRAM

## 2023-09-08 PROCEDURE — 77014 PR  CT GUIDANCE PLACEMENT RAD THERAPY FIELDS: ICD-10-PCS | Mod: 26,,, | Performed by: STUDENT IN AN ORGANIZED HEALTH CARE EDUCATION/TRAINING PROGRAM

## 2023-09-08 PROCEDURE — 77386 HC IMRT, COMPLEX: CPT | Performed by: STUDENT IN AN ORGANIZED HEALTH CARE EDUCATION/TRAINING PROGRAM

## 2023-09-08 PROCEDURE — 77014 PR  CT GUIDANCE PLACEMENT RAD THERAPY FIELDS: CPT | Mod: 26,,, | Performed by: STUDENT IN AN ORGANIZED HEALTH CARE EDUCATION/TRAINING PROGRAM

## 2023-09-11 PROCEDURE — 77386 HC IMRT, COMPLEX: CPT | Mod: PN | Performed by: STUDENT IN AN ORGANIZED HEALTH CARE EDUCATION/TRAINING PROGRAM

## 2023-09-11 PROCEDURE — 77014 PR  CT GUIDANCE PLACEMENT RAD THERAPY FIELDS: CPT | Mod: 26,,, | Performed by: STUDENT IN AN ORGANIZED HEALTH CARE EDUCATION/TRAINING PROGRAM

## 2023-09-11 PROCEDURE — 77014 PR  CT GUIDANCE PLACEMENT RAD THERAPY FIELDS: ICD-10-PCS | Mod: 26,,, | Performed by: STUDENT IN AN ORGANIZED HEALTH CARE EDUCATION/TRAINING PROGRAM

## 2023-09-12 PROCEDURE — 77386 HC IMRT, COMPLEX: CPT | Mod: PN | Performed by: STUDENT IN AN ORGANIZED HEALTH CARE EDUCATION/TRAINING PROGRAM

## 2023-09-12 PROCEDURE — 77014 PR  CT GUIDANCE PLACEMENT RAD THERAPY FIELDS: CPT | Mod: 26,,, | Performed by: STUDENT IN AN ORGANIZED HEALTH CARE EDUCATION/TRAINING PROGRAM

## 2023-09-12 PROCEDURE — 77336 RADIATION PHYSICS CONSULT: CPT | Mod: PN | Performed by: STUDENT IN AN ORGANIZED HEALTH CARE EDUCATION/TRAINING PROGRAM

## 2023-09-12 PROCEDURE — 77014 PR  CT GUIDANCE PLACEMENT RAD THERAPY FIELDS: ICD-10-PCS | Mod: 26,,, | Performed by: STUDENT IN AN ORGANIZED HEALTH CARE EDUCATION/TRAINING PROGRAM

## 2023-09-13 ENCOUNTER — LAB VISIT (OUTPATIENT)
Dept: LAB | Facility: HOSPITAL | Age: 43
End: 2023-09-13
Attending: PSYCHIATRY & NEUROLOGY
Payer: COMMERCIAL

## 2023-09-13 DIAGNOSIS — C71.9 OLIGODENDROGLIOMA DETERMINED BY BIOPSY OF BRAIN: ICD-10-CM

## 2023-09-13 LAB
ALBUMIN SERPL BCP-MCNC: 4.2 G/DL (ref 3.5–5.2)
ALP SERPL-CCNC: 61 U/L (ref 55–135)
ALT SERPL W/O P-5'-P-CCNC: 40 U/L (ref 10–44)
ANION GAP SERPL CALC-SCNC: 10 MMOL/L (ref 8–16)
AST SERPL-CCNC: 26 U/L (ref 10–40)
BASOPHILS # BLD AUTO: 0.02 K/UL (ref 0–0.2)
BASOPHILS NFR BLD: 0.3 % (ref 0–1.9)
BILIRUB SERPL-MCNC: 0.4 MG/DL (ref 0.1–1)
BUN SERPL-MCNC: 12 MG/DL (ref 6–20)
CALCIUM SERPL-MCNC: 9.9 MG/DL (ref 8.7–10.5)
CHLORIDE SERPL-SCNC: 106 MMOL/L (ref 95–110)
CO2 SERPL-SCNC: 26 MMOL/L (ref 23–29)
CREAT SERPL-MCNC: 0.8 MG/DL (ref 0.5–1.4)
DIFFERENTIAL METHOD: ABNORMAL
EOSINOPHIL # BLD AUTO: 0 K/UL (ref 0–0.5)
EOSINOPHIL NFR BLD: 0.5 % (ref 0–8)
ERYTHROCYTE [DISTWIDTH] IN BLOOD BY AUTOMATED COUNT: 12.2 % (ref 11.5–14.5)
EST. GFR  (NO RACE VARIABLE): >60 ML/MIN/1.73 M^2
GLUCOSE SERPL-MCNC: 92 MG/DL (ref 70–110)
HCT VFR BLD AUTO: 41.8 % (ref 40–54)
HGB BLD-MCNC: 14.9 G/DL (ref 14–18)
IMM GRANULOCYTES # BLD AUTO: 0.02 K/UL (ref 0–0.04)
IMM GRANULOCYTES NFR BLD AUTO: 0.3 % (ref 0–0.5)
LYMPHOCYTES # BLD AUTO: 1.8 K/UL (ref 1–4.8)
LYMPHOCYTES NFR BLD: 26.6 % (ref 18–48)
MCH RBC QN AUTO: 32.3 PG (ref 27–31)
MCHC RBC AUTO-ENTMCNC: 35.6 G/DL (ref 32–36)
MCV RBC AUTO: 91 FL (ref 82–98)
MONOCYTES # BLD AUTO: 0.6 K/UL (ref 0.3–1)
MONOCYTES NFR BLD: 9.5 % (ref 4–15)
NEUTROPHILS # BLD AUTO: 4.2 K/UL (ref 1.8–7.7)
NEUTROPHILS NFR BLD: 62.8 % (ref 38–73)
NRBC BLD-RTO: 0 /100 WBC
PLATELET # BLD AUTO: 269 K/UL (ref 150–450)
PMV BLD AUTO: 9.5 FL (ref 9.2–12.9)
POTASSIUM SERPL-SCNC: 4 MMOL/L (ref 3.5–5.1)
PROT SERPL-MCNC: 7.5 G/DL (ref 6–8.4)
RBC # BLD AUTO: 4.61 M/UL (ref 4.6–6.2)
SODIUM SERPL-SCNC: 142 MMOL/L (ref 136–145)
WBC # BLD AUTO: 6.65 K/UL (ref 3.9–12.7)

## 2023-09-13 PROCEDURE — 77427 RADIATION TX MANAGEMENT X5: CPT | Mod: ,,, | Performed by: STUDENT IN AN ORGANIZED HEALTH CARE EDUCATION/TRAINING PROGRAM

## 2023-09-13 PROCEDURE — 77014 PR  CT GUIDANCE PLACEMENT RAD THERAPY FIELDS: ICD-10-PCS | Mod: 26,,, | Performed by: STUDENT IN AN ORGANIZED HEALTH CARE EDUCATION/TRAINING PROGRAM

## 2023-09-13 PROCEDURE — 77386 HC IMRT, COMPLEX: CPT | Mod: PN | Performed by: STUDENT IN AN ORGANIZED HEALTH CARE EDUCATION/TRAINING PROGRAM

## 2023-09-13 PROCEDURE — 77014 PR  CT GUIDANCE PLACEMENT RAD THERAPY FIELDS: CPT | Mod: 26,,, | Performed by: STUDENT IN AN ORGANIZED HEALTH CARE EDUCATION/TRAINING PROGRAM

## 2023-09-13 PROCEDURE — 85025 COMPLETE CBC W/AUTO DIFF WBC: CPT | Mod: PN | Performed by: PSYCHIATRY & NEUROLOGY

## 2023-09-13 PROCEDURE — 77427 PR CHG RADIATION,MANGEMENT,5 TX'S: ICD-10-PCS | Mod: ,,, | Performed by: STUDENT IN AN ORGANIZED HEALTH CARE EDUCATION/TRAINING PROGRAM

## 2023-09-13 PROCEDURE — 80053 COMPREHEN METABOLIC PANEL: CPT | Mod: PO | Performed by: PSYCHIATRY & NEUROLOGY

## 2023-09-13 PROCEDURE — 36415 COLL VENOUS BLD VENIPUNCTURE: CPT | Mod: PN | Performed by: PSYCHIATRY & NEUROLOGY

## 2023-09-14 ENCOUNTER — DOCUMENTATION ONLY (OUTPATIENT)
Dept: RADIATION ONCOLOGY | Facility: CLINIC | Age: 43
End: 2023-09-14
Payer: COMMERCIAL

## 2023-09-14 PROCEDURE — 77386 HC IMRT, COMPLEX: CPT | Mod: PN | Performed by: RADIOLOGY

## 2023-09-14 PROCEDURE — 77014 PR  CT GUIDANCE PLACEMENT RAD THERAPY FIELDS: CPT | Mod: 26,,, | Performed by: RADIOLOGY

## 2023-09-14 PROCEDURE — 77014 PR  CT GUIDANCE PLACEMENT RAD THERAPY FIELDS: ICD-10-PCS | Mod: 26,,, | Performed by: RADIOLOGY

## 2023-09-15 PROCEDURE — 77014 PR  CT GUIDANCE PLACEMENT RAD THERAPY FIELDS: CPT | Mod: 26,,, | Performed by: RADIOLOGY

## 2023-09-15 PROCEDURE — 77386 HC IMRT, COMPLEX: CPT | Mod: PN | Performed by: RADIOLOGY

## 2023-09-15 PROCEDURE — 77014 PR  CT GUIDANCE PLACEMENT RAD THERAPY FIELDS: ICD-10-PCS | Mod: 26,,, | Performed by: RADIOLOGY

## 2023-09-18 ENCOUNTER — PATIENT MESSAGE (OUTPATIENT)
Dept: PRIMARY CARE CLINIC | Facility: CLINIC | Age: 43
End: 2023-09-18
Payer: COMMERCIAL

## 2023-09-18 PROCEDURE — 77386 HC IMRT, COMPLEX: CPT | Mod: PN | Performed by: RADIOLOGY

## 2023-09-18 PROCEDURE — 77014 PR  CT GUIDANCE PLACEMENT RAD THERAPY FIELDS: CPT | Mod: 26,,, | Performed by: RADIOLOGY

## 2023-09-18 PROCEDURE — 77014 PR  CT GUIDANCE PLACEMENT RAD THERAPY FIELDS: ICD-10-PCS | Mod: 26,,, | Performed by: RADIOLOGY

## 2023-09-19 PROCEDURE — 77386 HC IMRT, COMPLEX: CPT | Mod: PN | Performed by: RADIOLOGY

## 2023-09-19 PROCEDURE — 77014 PR  CT GUIDANCE PLACEMENT RAD THERAPY FIELDS: ICD-10-PCS | Mod: 26,,, | Performed by: RADIOLOGY

## 2023-09-19 PROCEDURE — 77336 RADIATION PHYSICS CONSULT: CPT | Mod: PN | Performed by: RADIOLOGY

## 2023-09-19 PROCEDURE — 77014 PR  CT GUIDANCE PLACEMENT RAD THERAPY FIELDS: CPT | Mod: 26,,, | Performed by: RADIOLOGY

## 2023-09-20 ENCOUNTER — LAB VISIT (OUTPATIENT)
Dept: LAB | Facility: HOSPITAL | Age: 43
End: 2023-09-20
Attending: PSYCHIATRY & NEUROLOGY
Payer: COMMERCIAL

## 2023-09-20 DIAGNOSIS — C71.9 OLIGODENDROGLIOMA DETERMINED BY BIOPSY OF BRAIN: ICD-10-CM

## 2023-09-20 DIAGNOSIS — C71.9 OLIGODENDROGLIOMA DETERMINED BY BIOPSY OF BRAIN: Primary | ICD-10-CM

## 2023-09-20 LAB
ALBUMIN SERPL BCP-MCNC: 4 G/DL (ref 3.5–5.2)
ALP SERPL-CCNC: 67 U/L (ref 55–135)
ALT SERPL W/O P-5'-P-CCNC: 32 U/L (ref 10–44)
ANION GAP SERPL CALC-SCNC: 11 MMOL/L (ref 8–16)
AST SERPL-CCNC: 21 U/L (ref 10–40)
BASOPHILS # BLD AUTO: 0.02 K/UL (ref 0–0.2)
BASOPHILS NFR BLD: 0.4 % (ref 0–1.9)
BILIRUB SERPL-MCNC: 0.3 MG/DL (ref 0.1–1)
BUN SERPL-MCNC: 14 MG/DL (ref 6–20)
CALCIUM SERPL-MCNC: 9.4 MG/DL (ref 8.7–10.5)
CHLORIDE SERPL-SCNC: 106 MMOL/L (ref 95–110)
CO2 SERPL-SCNC: 25 MMOL/L (ref 23–29)
CREAT SERPL-MCNC: 0.9 MG/DL (ref 0.5–1.4)
DIFFERENTIAL METHOD: NORMAL
EOSINOPHIL # BLD AUTO: 0.1 K/UL (ref 0–0.5)
EOSINOPHIL NFR BLD: 1.3 % (ref 0–8)
ERYTHROCYTE [DISTWIDTH] IN BLOOD BY AUTOMATED COUNT: 12.3 % (ref 11.5–14.5)
EST. GFR  (NO RACE VARIABLE): >60 ML/MIN/1.73 M^2
GLUCOSE SERPL-MCNC: 92 MG/DL (ref 70–110)
HCT VFR BLD AUTO: 42.1 % (ref 40–54)
HGB BLD-MCNC: 14.3 G/DL (ref 14–18)
IMM GRANULOCYTES # BLD AUTO: 0.01 K/UL (ref 0–0.04)
IMM GRANULOCYTES NFR BLD AUTO: 0.2 % (ref 0–0.5)
LYMPHOCYTES # BLD AUTO: 1.4 K/UL (ref 1–4.8)
LYMPHOCYTES NFR BLD: 25.4 % (ref 18–48)
MCH RBC QN AUTO: 30.8 PG (ref 27–31)
MCHC RBC AUTO-ENTMCNC: 34 G/DL (ref 32–36)
MCV RBC AUTO: 91 FL (ref 82–98)
MONOCYTES # BLD AUTO: 0.7 K/UL (ref 0.3–1)
MONOCYTES NFR BLD: 12.8 % (ref 4–15)
NEUTROPHILS # BLD AUTO: 3.3 K/UL (ref 1.8–7.7)
NEUTROPHILS NFR BLD: 59.9 % (ref 38–73)
NRBC BLD-RTO: 0 /100 WBC
PLATELET # BLD AUTO: 237 K/UL (ref 150–450)
PMV BLD AUTO: 9.7 FL (ref 9.2–12.9)
POTASSIUM SERPL-SCNC: 3.7 MMOL/L (ref 3.5–5.1)
PROT SERPL-MCNC: 7.1 G/DL (ref 6–8.4)
RBC # BLD AUTO: 4.64 M/UL (ref 4.6–6.2)
SODIUM SERPL-SCNC: 142 MMOL/L (ref 136–145)
WBC # BLD AUTO: 5.47 K/UL (ref 3.9–12.7)

## 2023-09-20 PROCEDURE — 77014 PR  CT GUIDANCE PLACEMENT RAD THERAPY FIELDS: CPT | Mod: 26,,, | Performed by: RADIOLOGY

## 2023-09-20 PROCEDURE — 85025 COMPLETE CBC W/AUTO DIFF WBC: CPT | Mod: PN | Performed by: PSYCHIATRY & NEUROLOGY

## 2023-09-20 PROCEDURE — 77386 HC IMRT, COMPLEX: CPT | Mod: PN | Performed by: RADIOLOGY

## 2023-09-20 PROCEDURE — 36415 COLL VENOUS BLD VENIPUNCTURE: CPT | Mod: PN | Performed by: PSYCHIATRY & NEUROLOGY

## 2023-09-20 PROCEDURE — 80053 COMPREHEN METABOLIC PANEL: CPT | Mod: PN | Performed by: PSYCHIATRY & NEUROLOGY

## 2023-09-20 PROCEDURE — 77014 PR  CT GUIDANCE PLACEMENT RAD THERAPY FIELDS: ICD-10-PCS | Mod: 26,,, | Performed by: RADIOLOGY

## 2023-09-21 ENCOUNTER — DOCUMENTATION ONLY (OUTPATIENT)
Dept: RADIATION ONCOLOGY | Facility: CLINIC | Age: 43
End: 2023-09-21
Payer: COMMERCIAL

## 2023-09-21 PROCEDURE — 77014 PR  CT GUIDANCE PLACEMENT RAD THERAPY FIELDS: ICD-10-PCS | Mod: 26,,, | Performed by: RADIOLOGY

## 2023-09-21 PROCEDURE — 77014 PR  CT GUIDANCE PLACEMENT RAD THERAPY FIELDS: CPT | Mod: 26,,, | Performed by: RADIOLOGY

## 2023-09-21 PROCEDURE — 77386 HC IMRT, COMPLEX: CPT | Mod: PN | Performed by: RADIOLOGY

## 2023-09-21 NOTE — PLAN OF CARE
Completed 12 of 30 outpatient radiation treatments to the brain without difficulty.  No new problems noted.  No questions/concerns this visit.

## 2023-09-22 PROCEDURE — 77014 PR  CT GUIDANCE PLACEMENT RAD THERAPY FIELDS: CPT | Mod: 26,,, | Performed by: RADIOLOGY

## 2023-09-22 PROCEDURE — 77386 HC IMRT, COMPLEX: CPT | Mod: PN | Performed by: RADIOLOGY

## 2023-09-22 PROCEDURE — 77014 PR  CT GUIDANCE PLACEMENT RAD THERAPY FIELDS: ICD-10-PCS | Mod: 26,,, | Performed by: RADIOLOGY

## 2023-09-25 PROCEDURE — 77386 HC IMRT, COMPLEX: CPT | Mod: PN | Performed by: RADIOLOGY

## 2023-09-25 PROCEDURE — 77427 PR CHG RADIATION,MANGEMENT,5 TX'S: ICD-10-PCS | Mod: ,,, | Performed by: RADIOLOGY

## 2023-09-25 PROCEDURE — 77427 RADIATION TX MANAGEMENT X5: CPT | Mod: ,,, | Performed by: RADIOLOGY

## 2023-09-25 PROCEDURE — 77014 PR  CT GUIDANCE PLACEMENT RAD THERAPY FIELDS: CPT | Mod: 26,,, | Performed by: RADIOLOGY

## 2023-09-25 PROCEDURE — 77014 PR  CT GUIDANCE PLACEMENT RAD THERAPY FIELDS: ICD-10-PCS | Mod: 26,,, | Performed by: RADIOLOGY

## 2023-09-26 PROCEDURE — 77014 PR  CT GUIDANCE PLACEMENT RAD THERAPY FIELDS: ICD-10-PCS | Mod: 26,,, | Performed by: RADIOLOGY

## 2023-09-26 PROCEDURE — 77336 RADIATION PHYSICS CONSULT: CPT | Mod: PN | Performed by: RADIOLOGY

## 2023-09-26 PROCEDURE — 77386 HC IMRT, COMPLEX: CPT | Mod: PN | Performed by: RADIOLOGY

## 2023-09-26 PROCEDURE — 77014 PR  CT GUIDANCE PLACEMENT RAD THERAPY FIELDS: CPT | Mod: 26,,, | Performed by: RADIOLOGY

## 2023-09-27 ENCOUNTER — OFFICE VISIT (OUTPATIENT)
Dept: NEUROLOGY | Facility: CLINIC | Age: 43
End: 2023-09-27
Payer: COMMERCIAL

## 2023-09-27 ENCOUNTER — LAB VISIT (OUTPATIENT)
Dept: LAB | Facility: HOSPITAL | Age: 43
End: 2023-09-27
Attending: PSYCHIATRY & NEUROLOGY
Payer: COMMERCIAL

## 2023-09-27 DIAGNOSIS — R56.9 SEIZURE: ICD-10-CM

## 2023-09-27 DIAGNOSIS — C71.9 OLIGODENDROGLIOMA DETERMINED BY BIOPSY OF BRAIN: ICD-10-CM

## 2023-09-27 DIAGNOSIS — C71.9 OLIGODENDROGLIOMA DETERMINED BY BIOPSY OF BRAIN: Primary | ICD-10-CM

## 2023-09-27 LAB
ALBUMIN SERPL BCP-MCNC: 4 G/DL (ref 3.5–5.2)
ALP SERPL-CCNC: 69 U/L (ref 55–135)
ALT SERPL W/O P-5'-P-CCNC: 33 U/L (ref 10–44)
ANION GAP SERPL CALC-SCNC: 10 MMOL/L (ref 8–16)
AST SERPL-CCNC: 22 U/L (ref 10–40)
BASOPHILS # BLD AUTO: 0.03 K/UL (ref 0–0.2)
BASOPHILS NFR BLD: 0.6 % (ref 0–1.9)
BILIRUB SERPL-MCNC: 0.4 MG/DL (ref 0.1–1)
BUN SERPL-MCNC: 15 MG/DL (ref 6–20)
CALCIUM SERPL-MCNC: 9.8 MG/DL (ref 8.7–10.5)
CHLORIDE SERPL-SCNC: 107 MMOL/L (ref 95–110)
CO2 SERPL-SCNC: 27 MMOL/L (ref 23–29)
CREAT SERPL-MCNC: 0.8 MG/DL (ref 0.5–1.4)
DIFFERENTIAL METHOD: ABNORMAL
EOSINOPHIL # BLD AUTO: 0.1 K/UL (ref 0–0.5)
EOSINOPHIL NFR BLD: 1.7 % (ref 0–8)
ERYTHROCYTE [DISTWIDTH] IN BLOOD BY AUTOMATED COUNT: 12.5 % (ref 11.5–14.5)
EST. GFR  (NO RACE VARIABLE): >60 ML/MIN/1.73 M^2
GLUCOSE SERPL-MCNC: 87 MG/DL (ref 70–110)
HCT VFR BLD AUTO: 41.6 % (ref 40–54)
HGB BLD-MCNC: 14.4 G/DL (ref 14–18)
IMM GRANULOCYTES # BLD AUTO: 0.01 K/UL (ref 0–0.04)
IMM GRANULOCYTES NFR BLD AUTO: 0.2 % (ref 0–0.5)
LYMPHOCYTES # BLD AUTO: 1.2 K/UL (ref 1–4.8)
LYMPHOCYTES NFR BLD: 26 % (ref 18–48)
MCH RBC QN AUTO: 31.5 PG (ref 27–31)
MCHC RBC AUTO-ENTMCNC: 34.6 G/DL (ref 32–36)
MCV RBC AUTO: 91 FL (ref 82–98)
MONOCYTES # BLD AUTO: 0.5 K/UL (ref 0.3–1)
MONOCYTES NFR BLD: 11.1 % (ref 4–15)
NEUTROPHILS # BLD AUTO: 2.8 K/UL (ref 1.8–7.7)
NEUTROPHILS NFR BLD: 60.4 % (ref 38–73)
NRBC BLD-RTO: 0 /100 WBC
PLATELET # BLD AUTO: 269 K/UL (ref 150–450)
PMV BLD AUTO: 9.5 FL (ref 9.2–12.9)
POTASSIUM SERPL-SCNC: 4.1 MMOL/L (ref 3.5–5.1)
PROT SERPL-MCNC: 7.2 G/DL (ref 6–8.4)
RBC # BLD AUTO: 4.57 M/UL (ref 4.6–6.2)
SODIUM SERPL-SCNC: 144 MMOL/L (ref 136–145)
WBC # BLD AUTO: 4.7 K/UL (ref 3.9–12.7)

## 2023-09-27 PROCEDURE — 99215 OFFICE O/P EST HI 40 MIN: CPT | Mod: 95,,, | Performed by: PSYCHIATRY & NEUROLOGY

## 2023-09-27 PROCEDURE — 36415 COLL VENOUS BLD VENIPUNCTURE: CPT | Mod: PN | Performed by: PSYCHIATRY & NEUROLOGY

## 2023-09-27 PROCEDURE — 77014 PR  CT GUIDANCE PLACEMENT RAD THERAPY FIELDS: CPT | Mod: 26,,, | Performed by: RADIOLOGY

## 2023-09-27 PROCEDURE — 99215 PR OFFICE/OUTPT VISIT, EST, LEVL V, 40-54 MIN: ICD-10-PCS | Mod: 95,,, | Performed by: PSYCHIATRY & NEUROLOGY

## 2023-09-27 PROCEDURE — 85025 COMPLETE CBC W/AUTO DIFF WBC: CPT | Mod: PN | Performed by: PSYCHIATRY & NEUROLOGY

## 2023-09-27 PROCEDURE — 77386 HC IMRT, COMPLEX: CPT | Mod: PN | Performed by: RADIOLOGY

## 2023-09-27 PROCEDURE — 80053 COMPREHEN METABOLIC PANEL: CPT | Mod: PN | Performed by: PSYCHIATRY & NEUROLOGY

## 2023-09-27 PROCEDURE — 77014 PR  CT GUIDANCE PLACEMENT RAD THERAPY FIELDS: ICD-10-PCS | Mod: 26,,, | Performed by: RADIOLOGY

## 2023-09-27 NOTE — PROGRESS NOTES
Subjective:       Patient ID: Carlos Jacobson is a 43 y.o. male.    Chief Complaint: No chief complaint on file.    HPI  5/2023: presented to urgent care for evaluation of tunnel vision and left foot numbness. CT head showed a left frontal lesion. MRI brain confirmed diffuse, non-enhancing tumor within the temporal and parietal lobes involving the cingulate gyrus and crossing the midline via diffuse infiltration of the posterior corpus callosum  6/13/2023: biopsy of lesion (Michele Schuler) with pathology consistent with oligodendroglioma, WHO grade 2, IDH- and 1p/19q- status pending  7/27/2023: resection of lesion with Dr. Segura consistent with oligodendroglioma, IDH-mutant and 1p/19q-codeleted, residual CNS WHO Grade 2  9/6/2023-present: chemoradiation (Sena Shelton)    Feeling overall well. Having some hair loss and fatigue. No additional seizures or new symptoms. No nausea. Constipation is well managed.    Past Medical History:   Diagnosis Date    Allergy     Anxiety     Brain tumor     Cancer     Clostridium difficile colitis 04/28/2014    GERD (gastroesophageal reflux disease)     Renal lesion       Current Outpatient Medications   Medication Sig Dispense Refill    ascorbic acid, vitamin C, (VITAMIN C) 1000 MG tablet Take 1,000 mg by mouth once daily.      EScitalopram oxalate (LEXAPRO) 10 MG tablet Take 1.5 tablets (15 mg total) by mouth once daily. 135 tablet 4    famotidine (PEPCID) 20 MG tablet Take 1 tablet (20 mg total) by mouth 2 (two) times daily. for 14 days (Patient not taking: Reported on 8/18/2023) 28 tablet 0    Lactobacillus acidophilus (PROBIOTIC ORAL) Take 1 tablet by mouth once daily.      levETIRAcetam (KEPPRA) 750 MG Tab Take 1 tablet (750 mg total) by mouth 2 (two) times daily. 180 tablet 3    levocetirizine (XYZAL) 5 MG tablet Take 5 mg by mouth every evening.      memantine (NAMENDA TITRATION PACK) tablet pack Follow package directions. 1 packet 0    multivitamin (THERAGRAN) per tablet  Take 1 tablet by mouth once daily.      ondansetron (ZOFRAN) 8 MG tablet Take 8mg (1 tab) 30-45 minutes prior to chemotherapy dose and every 8 hours as needed 30 tablet 3    oxyCODONE (ROXICODONE) 5 MG immediate release tablet Take 1 tablet (5 mg total) by mouth every 4 (four) hours as needed for Pain. 30 tablet 0    temozolomide (TEMODAR) 140 MG capsule Take 1 capsule (140 mg total) by mouth once daily Take as directed days 1-42 (6 weeks). Take on an empty stomach. with 1 other temozolomide prescription for 155 mg total. 42 capsule 0    temozolomide (TEMODAR) 5 MG capsule Take 3 capsules (15 mg total) by mouth once daily Take as directed days 1-42 (6 weeks). Take on an empty stomach. with 1 other temozolomide prescription for 155 mg total. 126 capsule 0     No current facility-administered medications for this visit.      Past Surgical History:   Procedure Laterality Date    BILATERAL INGUINAL HERNIA REPAIR Bilateral 1987    COLONOSCOPY  2014    CRANIOTOMY Left 7/27/2023    Procedure: CRANIOTOMY;  Surgeon: Michele Schuler MD;  Location: [address];  Service: Neurosurgery;  Laterality: Left;  LEFT PARIETAL CRANIOTOMY WITH RESECTION OF BRAIN MASS WITH DR. MIMS    STEREOTACTIC BIOPSY OF BRAIN Left 6/13/2023    Procedure: BIOPSY, BRAIN, STEREOTACTIC - LEFT PARIETAL BRAIN BIOPSY;  Surgeon: Michele Schuler MD;  Location: [facility];  Service: Neurosurgery;  Laterality: Left;      Family History   Problem Relation Age of Onset    Hyperlipidemia Mother     Hyperlipidemia Father     No Known Problems Sister     Hyperlipidemia Brother     Hyperlipidemia Maternal Uncle     Diabetes Maternal Grandmother     Lung cancer Maternal Grandmother       Social History     Tobacco Use    Smoking status: Some Days     Types: Cigars    Smokeless tobacco: Never    Tobacco comments:     Cigar once a year   Substance Use Topics    Alcohol use: No     Comment: occasionally    Drug use: No      Review of Systems   Constitutional:   Negative for fatigue, fever and unexpected weight change.   HENT:  Negative for dental problem and drooling.    Eyes:  Positive for photophobia and visual disturbance.   Respiratory:  Negative for cough and shortness of breath.    Cardiovascular:  Negative for chest pain and leg swelling.   Gastrointestinal:  Negative for abdominal pain and nausea.   Genitourinary:  Negative for difficulty urinating and dysuria.   Musculoskeletal:  Negative for arthralgias and back pain.   Neurological:  Positive for headaches. Negative for dizziness, seizures, weakness and numbness.   Psychiatric/Behavioral:  Negative for agitation and confusion.      KPS: 90      Objective:      There were no vitals filed for this visit.  Telehealth visit     (Prior)  NEUROLOGICAL EXAMINATION:     MENTAL STATUS   Attention: normal. Concentration: normal.   Speech: speech is normal   Level of consciousness: alert    CRANIAL NERVES     CN II   Right visual field deficit: Right homonymous hemainopia.    CN III, IV, VI   Extraocular motions are normal.     CN V   Facial sensation intact.     CN VII   Facial expression full, symmetric.     CN VIII   CN VIII normal.     CN IX, X   CN IX normal.   CN X normal.     CN XI   CN XI normal.     CN XII   CN XII normal.     MOTOR EXAM     Strength   Strength 5/5 throughout.     SENSORY EXAM   Light touch normal.     GAIT AND COORDINATION     Gait  Gait: normal     Coordination   Finger to nose coordination: normal       Assessment:       Problem List Items Addressed This Visit          Oncology    Oligodendroglioma determined by biopsy of brain - Primary     Other Visit Diagnoses       Seizure                    Plan:       Carlos Jacobson is a 43 y.o. male with an oligodendroglioma, WHO grade 2 s/p biopsy presenting for treatment recommendations. Tumor diagnosis was heralded by left foot numbness and tunnel vision. Current symptoms include right homonymous hemianopsia and blurry  vision.    Oligodendroglioma  Patient had additional resection and debulking with Dr. Segura on 07/27/2023 with pathology returning as oligodendroglioma, WHO grade 2, IDH-mutant. Plan for chemoradiation followed by adjuvant TMZ x12 months.     Continue radiation and temozolomide at 75 milligrams/meter squared per night.  - TMZ 75 milligrams/meter squared per night equal to a dose of 195mg nightly  - Pre-treat with ondansetron 8mg 30-45 min prior to chemotherapy dose. Take any regular nighttime medications with the ondansetron.   - Take chemotherapy just prior to bedtime.   - Do not eat for about 2-3 hours prior to taking the chemotherapy.  - Start Docusate daily starting 2-3 days prior to chemotherapy and laxatives every 2-3 days as needed.  - CBC weekly and CMP at week 3 and 6 of chemoradiation    Seizure  Had prior episodic aphasia for 30 seconds at a time with quick return to baseline is a focal seizure. Other potential etiologies include fatigue, anxiety. Continue levetiracetam 750mg bid and monitor.       The patient location is: home in [place]  The chief complaint leading to consultation is: oligodendroglioma    Visit type: audiovisual    Face to Face time with patient: 5 min          Each patient to whom he or she provides medical services by telemedicine is:  (1) informed of the relationship between the physician and patient and the respective role of any other health care provider with respect to management of the patient; and (2) notified that he or she may decline to receive medical services by telemedicine and may withdraw from such care at any time.    Notes:     Candice Mabry MD  Department of Neurology  Neuro-oncology, Movement Disorders

## 2023-09-28 ENCOUNTER — DOCUMENTATION ONLY (OUTPATIENT)
Dept: RADIATION ONCOLOGY | Facility: CLINIC | Age: 43
End: 2023-09-28
Payer: COMMERCIAL

## 2023-09-28 PROCEDURE — 77386 HC IMRT, COMPLEX: CPT | Mod: PN | Performed by: RADIOLOGY

## 2023-09-28 PROCEDURE — 77014 PR  CT GUIDANCE PLACEMENT RAD THERAPY FIELDS: ICD-10-PCS | Mod: 26,,, | Performed by: RADIOLOGY

## 2023-09-28 PROCEDURE — 77014 PR  CT GUIDANCE PLACEMENT RAD THERAPY FIELDS: CPT | Mod: 26,,, | Performed by: RADIOLOGY

## 2023-09-28 NOTE — PLAN OF CARE
Completed 17 of 30 outpatient radiation treatments to the brain without difficulty.  No new problems noted.  No questions or concerns this visit.

## 2023-09-29 PROCEDURE — 77014 PR  CT GUIDANCE PLACEMENT RAD THERAPY FIELDS: CPT | Mod: 26,,, | Performed by: RADIOLOGY

## 2023-09-29 PROCEDURE — 77386 HC IMRT, COMPLEX: CPT | Mod: PN | Performed by: RADIOLOGY

## 2023-09-29 PROCEDURE — 77014 PR  CT GUIDANCE PLACEMENT RAD THERAPY FIELDS: ICD-10-PCS | Mod: 26,,, | Performed by: RADIOLOGY

## 2023-10-02 ENCOUNTER — HOSPITAL ENCOUNTER (OUTPATIENT)
Dept: RADIATION THERAPY | Facility: HOSPITAL | Age: 43
Discharge: HOME | End: 2023-10-02
Attending: RADIOLOGY
Payer: COMMERCIAL

## 2023-10-02 PROCEDURE — 77427 PR CHG RADIATION,MANGEMENT,5 TX'S: ICD-10-PCS | Mod: ,,, | Performed by: RADIOLOGY

## 2023-10-02 PROCEDURE — 77014 PR  CT GUIDANCE PLACEMENT RAD THERAPY FIELDS: CPT | Mod: 26,,, | Performed by: RADIOLOGY

## 2023-10-02 PROCEDURE — 77386 HC IMRT, COMPLEX: CPT | Mod: PN | Performed by: RADIOLOGY

## 2023-10-02 PROCEDURE — 77014 PR  CT GUIDANCE PLACEMENT RAD THERAPY FIELDS: ICD-10-PCS | Mod: 26,,, | Performed by: RADIOLOGY

## 2023-10-02 PROCEDURE — 77427 RADIATION TX MANAGEMENT X5: CPT | Mod: ,,, | Performed by: RADIOLOGY

## 2023-10-03 PROCEDURE — 77014 PR  CT GUIDANCE PLACEMENT RAD THERAPY FIELDS: ICD-10-PCS | Mod: 26,,, | Performed by: RADIOLOGY

## 2023-10-03 PROCEDURE — 77014 PR  CT GUIDANCE PLACEMENT RAD THERAPY FIELDS: CPT | Mod: 26,,, | Performed by: RADIOLOGY

## 2023-10-03 PROCEDURE — 77386 HC IMRT, COMPLEX: CPT | Mod: PN | Performed by: RADIOLOGY

## 2023-10-04 ENCOUNTER — LAB VISIT (OUTPATIENT)
Dept: LAB | Facility: HOSPITAL | Age: 43
End: 2023-10-04
Attending: PSYCHIATRY & NEUROLOGY
Payer: COMMERCIAL

## 2023-10-04 DIAGNOSIS — C71.9 OLIGODENDROGLIOMA DETERMINED BY BIOPSY OF BRAIN: ICD-10-CM

## 2023-10-04 LAB
BASOPHILS # BLD AUTO: 0.02 K/UL (ref 0–0.2)
BASOPHILS NFR BLD: 0.4 % (ref 0–1.9)
DIFFERENTIAL METHOD: ABNORMAL
EOSINOPHIL # BLD AUTO: 0.1 K/UL (ref 0–0.5)
EOSINOPHIL NFR BLD: 1.7 % (ref 0–8)
ERYTHROCYTE [DISTWIDTH] IN BLOOD BY AUTOMATED COUNT: 12.6 % (ref 11.5–14.5)
HCT VFR BLD AUTO: 42 % (ref 40–54)
HGB BLD-MCNC: 14.3 G/DL (ref 14–18)
IMM GRANULOCYTES # BLD AUTO: 0.01 K/UL (ref 0–0.04)
IMM GRANULOCYTES NFR BLD AUTO: 0.2 % (ref 0–0.5)
LYMPHOCYTES # BLD AUTO: 0.8 K/UL (ref 1–4.8)
LYMPHOCYTES NFR BLD: 17.3 % (ref 18–48)
MCH RBC QN AUTO: 31 PG (ref 27–31)
MCHC RBC AUTO-ENTMCNC: 34 G/DL (ref 32–36)
MCV RBC AUTO: 91 FL (ref 82–98)
MONOCYTES # BLD AUTO: 0.6 K/UL (ref 0.3–1)
MONOCYTES NFR BLD: 12 % (ref 4–15)
NEUTROPHILS # BLD AUTO: 3.2 K/UL (ref 1.8–7.7)
NEUTROPHILS NFR BLD: 68.4 % (ref 38–73)
NRBC BLD-RTO: 0 /100 WBC
PLATELET # BLD AUTO: 270 K/UL (ref 150–450)
PMV BLD AUTO: 9.4 FL (ref 9.2–12.9)
RBC # BLD AUTO: 4.62 M/UL (ref 4.6–6.2)
WBC # BLD AUTO: 4.74 K/UL (ref 3.9–12.7)

## 2023-10-04 PROCEDURE — 77336 RADIATION PHYSICS CONSULT: CPT | Mod: PN | Performed by: RADIOLOGY

## 2023-10-04 PROCEDURE — 77014 PR  CT GUIDANCE PLACEMENT RAD THERAPY FIELDS: CPT | Mod: 26,,, | Performed by: RADIOLOGY

## 2023-10-04 PROCEDURE — 85025 COMPLETE CBC W/AUTO DIFF WBC: CPT | Mod: PN | Performed by: PSYCHIATRY & NEUROLOGY

## 2023-10-04 PROCEDURE — 77386 HC IMRT, COMPLEX: CPT | Mod: PN | Performed by: RADIOLOGY

## 2023-10-04 PROCEDURE — 77014 PR  CT GUIDANCE PLACEMENT RAD THERAPY FIELDS: ICD-10-PCS | Mod: 26,,, | Performed by: RADIOLOGY

## 2023-10-04 PROCEDURE — 36415 COLL VENOUS BLD VENIPUNCTURE: CPT | Mod: PN | Performed by: PSYCHIATRY & NEUROLOGY

## 2023-10-05 ENCOUNTER — DOCUMENTATION ONLY (OUTPATIENT)
Dept: RADIATION ONCOLOGY | Facility: CLINIC | Age: 43
End: 2023-10-05
Payer: COMMERCIAL

## 2023-10-05 ENCOUNTER — CLINICAL SUPPORT (OUTPATIENT)
Dept: OPHTHALMOLOGY | Facility: CLINIC | Age: 43
End: 2023-10-05
Payer: COMMERCIAL

## 2023-10-05 ENCOUNTER — OFFICE VISIT (OUTPATIENT)
Dept: OPHTHALMOLOGY | Facility: CLINIC | Age: 43
End: 2023-10-05
Payer: COMMERCIAL

## 2023-10-05 DIAGNOSIS — H53.461 HEMIANOPIA, HOMONYMOUS, RIGHT: ICD-10-CM

## 2023-10-05 DIAGNOSIS — C71.9 OLIGODENDROGLIOMA DETERMINED BY BIOPSY OF BRAIN: Primary | ICD-10-CM

## 2023-10-05 DIAGNOSIS — H53.9 VISION CHANGES: ICD-10-CM

## 2023-10-05 PROCEDURE — 77386 HC IMRT, COMPLEX: CPT | Mod: PN | Performed by: RADIOLOGY

## 2023-10-05 PROCEDURE — 99999 PR PBB SHADOW E&M-EST. PATIENT-LVL III: ICD-10-PCS | Mod: PBBFAC,,, | Performed by: OPHTHALMOLOGY

## 2023-10-05 PROCEDURE — 92083 HUMPHREY VISUAL FIELD - OU - BOTH EYES: ICD-10-PCS | Mod: S$GLB,,, | Performed by: OPHTHALMOLOGY

## 2023-10-05 PROCEDURE — 1159F MED LIST DOCD IN RCRD: CPT | Mod: CPTII,S$GLB,, | Performed by: OPHTHALMOLOGY

## 2023-10-05 PROCEDURE — 99204 PR OFFICE/OUTPT VISIT, NEW, LEVL IV, 45-59 MIN: ICD-10-PCS | Mod: S$GLB,,, | Performed by: OPHTHALMOLOGY

## 2023-10-05 PROCEDURE — 1160F PR REVIEW ALL MEDS BY PRESCRIBER/CLIN PHARMACIST DOCUMENTED: ICD-10-PCS | Mod: CPTII,S$GLB,, | Performed by: OPHTHALMOLOGY

## 2023-10-05 PROCEDURE — 1160F RVW MEDS BY RX/DR IN RCRD: CPT | Mod: CPTII,S$GLB,, | Performed by: OPHTHALMOLOGY

## 2023-10-05 PROCEDURE — 99204 OFFICE O/P NEW MOD 45 MIN: CPT | Mod: S$GLB,,, | Performed by: OPHTHALMOLOGY

## 2023-10-05 PROCEDURE — 99999 PR PBB SHADOW E&M-EST. PATIENT-LVL III: CPT | Mod: PBBFAC,,, | Performed by: OPHTHALMOLOGY

## 2023-10-05 PROCEDURE — 1159F PR MEDICATION LIST DOCUMENTED IN MEDICAL RECORD: ICD-10-PCS | Mod: CPTII,S$GLB,, | Performed by: OPHTHALMOLOGY

## 2023-10-05 PROCEDURE — 92083 EXTENDED VISUAL FIELD XM: CPT | Mod: S$GLB,,, | Performed by: OPHTHALMOLOGY

## 2023-10-05 PROCEDURE — 77014 PR  CT GUIDANCE PLACEMENT RAD THERAPY FIELDS: CPT | Mod: 26,,, | Performed by: RADIOLOGY

## 2023-10-05 PROCEDURE — 77014 PR  CT GUIDANCE PLACEMENT RAD THERAPY FIELDS: ICD-10-PCS | Mod: 26,,, | Performed by: RADIOLOGY

## 2023-10-05 NOTE — PATIENT INSTRUCTIONS
He will need to be followed to follow his recovery. I will repeat his exam and visual field testing in 3 months.

## 2023-10-05 NOTE — PLAN OF CARE
Patient completed 22 of 30 outpatient radiation treatments to the brain without difficulty.  No new problems verbalized.  All questions and concerns addressed by MD this visit.

## 2023-10-05 NOTE — LETTER
[facility] - [address] [place] [address]  [address]  [facility] [address]  Phone: [phone]  Fax: [phone]   October 5, 2023    Candice Mabry MD  [address]  [facility] [address]    Patient: Carlos Jacobson   MR Number: [id number]   YOB: 1980   Date of Visit: 10/5/2023       Dear Dr. Mabry:    Thank you for referring Carlos Jacobson to me for evaluation. Here is my assessment and plan of care:    Assessment/Plan    For exam results, see Encounter Report.    Oligodendroglioma determined by biopsy of brain  -     Way Visual Field - OU - Extended - Both Eyes; Future  -     Ambulatory referral/consult to Ophthalmology    Vision changes  -     Ambulatory referral/consult to Ophthalmology    Hemianopia, homonymous, right      He will need to be followed to follow his recovery. I will repeat his exam and visual field testing in 3 months.           Below you will find my full exam findings. If you have questions, please do not hesitate to call me. I look forward to following Mr. Carlos Jacobson along with you.    Sincerely,          Derek Farris MD       CC  MD Michele Pimentel MD             Base Eye Exam       Visual Acuity (Snellen - Linear)         Right Left    Dist sc 20/30 -2 20/30    Dist ph sc NI 20/25 -2              Tonometry (Tonopen, 10:05 AM)         Right Left    Pressure 12 10              Pupils         Dark Light Shape React APD    Right 4 2 Round Brisk None    Left 4 2 Round Brisk None              Visual Fields      See HVF report             Extraocular Movement         Right Left     Full, Ortho Full, Ortho              Neuro/Psych       Oriented x3: Yes    Mood/Affect: Normal              Dilation       Both eyes: 2.5% Phenylephrine, 1% Mydriacyl @ 10:03 AM                  Slit Lamp and Fundus Exam       External Exam         Right Left    External Normal Normal              Slit Lamp Exam         Right Left    Lids/Lashes Normal Normal     Conjunctiva/Sclera White and quiet White and quiet    Cornea Clear Clear    Anterior Chamber Deep and quiet Deep and quiet    Iris Round and reactive Round and reactive    Lens Clear Clear    Vitreous Normal Normal              Fundus Exam         Right Left    Disc No edema No edema    C/D Ratio 0.3 0.3    Macula Normal Normal    Vessels Normal Normal    Periphery Normal Normal

## 2023-10-05 NOTE — PROGRESS NOTES
HPI    Referred by Dr.Caroline Clotilde RODRIGUEZ  Oligodendroglioma determined by biopsy of brain    Tumor removed x 2 months ago. Notice since no right sided peripheral   vision.  No eye pain.  Review HVF    I have personally interviewed the patient, reviewed the history and   examined the patient and agree with the technician's exam.   Last edited by Derek Farris MD on 10/5/2023 10:41 AM.            Assessment /Plan     For exam results, see Encounter Report.    Oligodendroglioma determined by biopsy of brain  -     Way Visual Field - OU - Extended - Both Eyes; Future  -     Ambulatory referral/consult to Ophthalmology    Vision changes  -     Ambulatory referral/consult to Ophthalmology    Hemianopia, homonymous, right      He will need to be followed to follow his recovery. I will repeat his exam and visual field testing in 3 months.

## 2023-10-06 PROCEDURE — 77386 HC IMRT, COMPLEX: CPT | Mod: PN | Performed by: RADIOLOGY

## 2023-10-06 PROCEDURE — 77014 PR  CT GUIDANCE PLACEMENT RAD THERAPY FIELDS: CPT | Mod: 26,,, | Performed by: RADIOLOGY

## 2023-10-06 PROCEDURE — 77014 PR  CT GUIDANCE PLACEMENT RAD THERAPY FIELDS: ICD-10-PCS | Mod: 26,,, | Performed by: RADIOLOGY

## 2023-10-09 PROCEDURE — 77014 PR  CT GUIDANCE PLACEMENT RAD THERAPY FIELDS: ICD-10-PCS | Mod: 26,,, | Performed by: RADIOLOGY

## 2023-10-09 PROCEDURE — 77386 HC IMRT, COMPLEX: CPT | Mod: PN | Performed by: RADIOLOGY

## 2023-10-09 PROCEDURE — 77014 PR  CT GUIDANCE PLACEMENT RAD THERAPY FIELDS: CPT | Mod: 26,,, | Performed by: RADIOLOGY

## 2023-10-10 ENCOUNTER — DOCUMENTATION ONLY (OUTPATIENT)
Dept: RADIATION ONCOLOGY | Facility: CLINIC | Age: 43
End: 2023-10-10
Payer: COMMERCIAL

## 2023-10-10 ENCOUNTER — TELEPHONE (OUTPATIENT)
Dept: NEUROSURGERY | Facility: CLINIC | Age: 43
End: 2023-10-10
Payer: COMMERCIAL

## 2023-10-10 ENCOUNTER — PATIENT MESSAGE (OUTPATIENT)
Dept: NEUROSURGERY | Facility: CLINIC | Age: 43
End: 2023-10-10
Payer: COMMERCIAL

## 2023-10-10 PROCEDURE — 77386 HC IMRT, COMPLEX: CPT | Mod: PN | Performed by: RADIOLOGY

## 2023-10-10 PROCEDURE — 77014 PR  CT GUIDANCE PLACEMENT RAD THERAPY FIELDS: CPT | Mod: 26,,, | Performed by: RADIOLOGY

## 2023-10-10 PROCEDURE — 77014 PR  CT GUIDANCE PLACEMENT RAD THERAPY FIELDS: ICD-10-PCS | Mod: 26,,, | Performed by: RADIOLOGY

## 2023-10-10 NOTE — PLAN OF CARE
25 of 30 outpatient radiation treatments to the brain completed this visit.  All questions and concerns addressed by MD.

## 2023-10-11 ENCOUNTER — LAB VISIT (OUTPATIENT)
Dept: LAB | Facility: HOSPITAL | Age: 43
End: 2023-10-11
Attending: PSYCHIATRY & NEUROLOGY
Payer: COMMERCIAL

## 2023-10-11 DIAGNOSIS — C71.9 OLIGODENDROGLIOMA DETERMINED BY BIOPSY OF BRAIN: ICD-10-CM

## 2023-10-11 LAB
BASOPHILS # BLD AUTO: 0.03 K/UL (ref 0–0.2)
BASOPHILS NFR BLD: 0.5 % (ref 0–1.9)
DIFFERENTIAL METHOD: ABNORMAL
EOSINOPHIL # BLD AUTO: 0.1 K/UL (ref 0–0.5)
EOSINOPHIL NFR BLD: 1.3 % (ref 0–8)
ERYTHROCYTE [DISTWIDTH] IN BLOOD BY AUTOMATED COUNT: 12.3 % (ref 11.5–14.5)
HCT VFR BLD AUTO: 43.5 % (ref 40–54)
HGB BLD-MCNC: 15 G/DL (ref 14–18)
IMM GRANULOCYTES # BLD AUTO: 0.01 K/UL (ref 0–0.04)
IMM GRANULOCYTES NFR BLD AUTO: 0.2 % (ref 0–0.5)
LYMPHOCYTES # BLD AUTO: 0.7 K/UL (ref 1–4.8)
LYMPHOCYTES NFR BLD: 12.4 % (ref 18–48)
MCH RBC QN AUTO: 31.6 PG (ref 27–31)
MCHC RBC AUTO-ENTMCNC: 34.5 G/DL (ref 32–36)
MCV RBC AUTO: 92 FL (ref 82–98)
MONOCYTES # BLD AUTO: 0.7 K/UL (ref 0.3–1)
MONOCYTES NFR BLD: 12.9 % (ref 4–15)
NEUTROPHILS # BLD AUTO: 4.1 K/UL (ref 1.8–7.7)
NEUTROPHILS NFR BLD: 72.7 % (ref 38–73)
NRBC BLD-RTO: 0 /100 WBC
PLATELET # BLD AUTO: 252 K/UL (ref 150–450)
PMV BLD AUTO: 9.3 FL (ref 9.2–12.9)
RBC # BLD AUTO: 4.75 M/UL (ref 4.6–6.2)
WBC # BLD AUTO: 5.58 K/UL (ref 3.9–12.7)

## 2023-10-11 PROCEDURE — 85025 COMPLETE CBC W/AUTO DIFF WBC: CPT | Mod: PN | Performed by: PSYCHIATRY & NEUROLOGY

## 2023-10-11 PROCEDURE — 77014 PR  CT GUIDANCE PLACEMENT RAD THERAPY FIELDS: ICD-10-PCS | Mod: 26,,, | Performed by: RADIOLOGY

## 2023-10-11 PROCEDURE — 77014 PR  CT GUIDANCE PLACEMENT RAD THERAPY FIELDS: CPT | Mod: 26,,, | Performed by: RADIOLOGY

## 2023-10-11 PROCEDURE — 36415 COLL VENOUS BLD VENIPUNCTURE: CPT | Mod: PN | Performed by: PSYCHIATRY & NEUROLOGY

## 2023-10-11 PROCEDURE — 77386 HC IMRT, COMPLEX: CPT | Mod: PN | Performed by: RADIOLOGY

## 2023-10-11 PROCEDURE — 77336 RADIATION PHYSICS CONSULT: CPT | Mod: PN | Performed by: RADIOLOGY

## 2023-10-11 RX ORDER — TEMOZOLOMIDE 5 MG/1
CAPSULE ORAL
Qty: 63 CAPSULE | Refills: 0 | Status: SHIPPED | OUTPATIENT
Start: 2023-10-11 | End: 2023-12-01

## 2023-10-11 RX ORDER — TEMOZOLOMIDE 140 MG/1
CAPSULE ORAL
Qty: 21 CAPSULE | Refills: 0 | Status: SHIPPED | OUTPATIENT
Start: 2023-10-11 | End: 2023-12-01

## 2023-10-12 PROCEDURE — 77014 PR  CT GUIDANCE PLACEMENT RAD THERAPY FIELDS: ICD-10-PCS | Mod: 26,,, | Performed by: RADIOLOGY

## 2023-10-12 PROCEDURE — 77014 PR  CT GUIDANCE PLACEMENT RAD THERAPY FIELDS: CPT | Mod: 26,,, | Performed by: RADIOLOGY

## 2023-10-12 PROCEDURE — 77386 HC IMRT, COMPLEX: CPT | Mod: PN | Performed by: RADIOLOGY

## 2023-10-13 PROCEDURE — 77014 PR  CT GUIDANCE PLACEMENT RAD THERAPY FIELDS: CPT | Mod: 26,,, | Performed by: RADIOLOGY

## 2023-10-13 PROCEDURE — 77014 PR  CT GUIDANCE PLACEMENT RAD THERAPY FIELDS: ICD-10-PCS | Mod: 26,,, | Performed by: RADIOLOGY

## 2023-10-13 PROCEDURE — 77386 HC IMRT, COMPLEX: CPT | Mod: PN | Performed by: RADIOLOGY

## 2023-10-16 PROCEDURE — 77386 HC IMRT, COMPLEX: CPT | Mod: PN | Performed by: RADIOLOGY

## 2023-10-16 PROCEDURE — 77014 PR  CT GUIDANCE PLACEMENT RAD THERAPY FIELDS: CPT | Mod: 26,,, | Performed by: RADIOLOGY

## 2023-10-16 PROCEDURE — 77014 PR  CT GUIDANCE PLACEMENT RAD THERAPY FIELDS: ICD-10-PCS | Mod: 26,,, | Performed by: RADIOLOGY

## 2023-10-17 ENCOUNTER — DOCUMENTATION ONLY (OUTPATIENT)
Dept: RADIATION ONCOLOGY | Facility: CLINIC | Age: 43
End: 2023-10-17
Payer: COMMERCIAL

## 2023-10-17 DIAGNOSIS — C71.9 OLIGODENDROGLIOMA DETERMINED BY BIOPSY OF BRAIN: Primary | ICD-10-CM

## 2023-10-17 PROCEDURE — 77014 PR  CT GUIDANCE PLACEMENT RAD THERAPY FIELDS: ICD-10-PCS | Mod: 26,,, | Performed by: RADIOLOGY

## 2023-10-17 PROCEDURE — 77014 PR  CT GUIDANCE PLACEMENT RAD THERAPY FIELDS: CPT | Mod: 26,,, | Performed by: RADIOLOGY

## 2023-10-17 PROCEDURE — 77386 HC IMRT, COMPLEX: CPT | Mod: PN | Performed by: RADIOLOGY

## 2023-10-17 NOTE — PLAN OF CARE
All external beam radiation treatments completed today without difficulty.  No new problems verbalized.  Follow-up appt and information provided and patient verbalizes understanding.  No questions or concerns to address with MD this visit.

## 2023-10-18 ENCOUNTER — PATIENT MESSAGE (OUTPATIENT)
Dept: CARDIOLOGY | Facility: CLINIC | Age: 43
End: 2023-10-18
Payer: COMMERCIAL

## 2023-10-18 PROCEDURE — 77336 RADIATION PHYSICS CONSULT: CPT | Mod: PN | Performed by: RADIOLOGY

## 2023-10-19 ENCOUNTER — DOCUMENTATION ONLY (OUTPATIENT)
Dept: HEMATOLOGY/ONCOLOGY | Facility: CLINIC | Age: 43
End: 2023-10-19
Payer: COMMERCIAL

## 2023-10-19 NOTE — PROGRESS NOTES
Chart reviewed for oncology navigational needs. Patient completed radiation treatments. All follow ups and follow up MRI scheduled already.  No needs noted at this time.

## 2023-10-20 ENCOUNTER — OFFICE VISIT (OUTPATIENT)
Dept: NEUROSURGERY | Facility: CLINIC | Age: 43
End: 2023-10-20
Payer: COMMERCIAL

## 2023-10-20 VITALS
BODY MASS INDEX: 24.2 KG/M2 | DIASTOLIC BLOOD PRESSURE: 69 MMHG | WEIGHT: 183.44 LBS | SYSTOLIC BLOOD PRESSURE: 108 MMHG | HEART RATE: 84 BPM

## 2023-10-20 DIAGNOSIS — C71.9 OLIGODENDROGLIOMA DETERMINED BY BIOPSY OF BRAIN: Primary | ICD-10-CM

## 2023-10-20 DIAGNOSIS — H53.459 DECREASED PERIPHERAL VISION, UNSPECIFIED LATERALITY: ICD-10-CM

## 2023-10-20 DIAGNOSIS — R56.9 SEIZURE: ICD-10-CM

## 2023-10-20 PROCEDURE — 3074F PR MOST RECENT SYSTOLIC BLOOD PRESSURE < 130 MM HG: ICD-10-PCS | Mod: CPTII,S$GLB,, | Performed by: PSYCHIATRY & NEUROLOGY

## 2023-10-20 PROCEDURE — 1159F PR MEDICATION LIST DOCUMENTED IN MEDICAL RECORD: ICD-10-PCS | Mod: CPTII,S$GLB,, | Performed by: PSYCHIATRY & NEUROLOGY

## 2023-10-20 PROCEDURE — 3074F SYST BP LT 130 MM HG: CPT | Mod: CPTII,S$GLB,, | Performed by: PSYCHIATRY & NEUROLOGY

## 2023-10-20 PROCEDURE — 3008F BODY MASS INDEX DOCD: CPT | Mod: CPTII,S$GLB,, | Performed by: PSYCHIATRY & NEUROLOGY

## 2023-10-20 PROCEDURE — 3008F PR BODY MASS INDEX (BMI) DOCUMENTED: ICD-10-PCS | Mod: CPTII,S$GLB,, | Performed by: PSYCHIATRY & NEUROLOGY

## 2023-10-20 PROCEDURE — 99999 PR PBB SHADOW E&M-EST. PATIENT-LVL III: CPT | Mod: PBBFAC,,, | Performed by: PSYCHIATRY & NEUROLOGY

## 2023-10-20 PROCEDURE — 99215 PR OFFICE/OUTPT VISIT, EST, LEVL V, 40-54 MIN: ICD-10-PCS | Mod: S$GLB,,, | Performed by: PSYCHIATRY & NEUROLOGY

## 2023-10-20 PROCEDURE — 3078F PR MOST RECENT DIASTOLIC BLOOD PRESSURE < 80 MM HG: ICD-10-PCS | Mod: CPTII,S$GLB,, | Performed by: PSYCHIATRY & NEUROLOGY

## 2023-10-20 PROCEDURE — 99215 OFFICE O/P EST HI 40 MIN: CPT | Mod: S$GLB,,, | Performed by: PSYCHIATRY & NEUROLOGY

## 2023-10-20 PROCEDURE — 3078F DIAST BP <80 MM HG: CPT | Mod: CPTII,S$GLB,, | Performed by: PSYCHIATRY & NEUROLOGY

## 2023-10-20 PROCEDURE — 99999 PR PBB SHADOW E&M-EST. PATIENT-LVL III: ICD-10-PCS | Mod: PBBFAC,,, | Performed by: PSYCHIATRY & NEUROLOGY

## 2023-10-20 PROCEDURE — 1159F MED LIST DOCD IN RCRD: CPT | Mod: CPTII,S$GLB,, | Performed by: PSYCHIATRY & NEUROLOGY

## 2023-10-20 NOTE — PROGRESS NOTES
Subjective:       Patient ID: Carlos Jacobson is a 43 y.o. male.    Chief Complaint: No chief complaint on file.    HPI  5/2023: presented to urgent care for evaluation of tunnel vision and left foot numbness. CT head showed a left frontal lesion. MRI brain confirmed diffuse, non-enhancing tumor within the temporal and parietal lobes involving the cingulate gyrus and crossing the midline via diffuse infiltration of the posterior corpus callosum  6/13/2023: biopsy of lesion (Michele Schuler) with pathology consistent with oligodendroglioma, WHO grade 2, IDH- and 1p/19q- status pending  7/27/2023: resection of lesion with Dr. Segura consistent with oligodendroglioma, IDH-mutant and 1p/19q-codeleted, residual CNS WHO Grade 2  9/6/2023-10/17/2023: chemoradiation (Sena Shelton)    Feeling overall well. Having some hair loss and fatigue. No additional seizures or new symptoms. No nausea. Constipation is well managed.    Past Medical History:   Diagnosis Date    Allergy     Anxiety     Brain tumor     Cancer     Clostridium difficile colitis 04/28/2014    GERD (gastroesophageal reflux disease)     Renal lesion       Current Outpatient Medications   Medication Sig Dispense Refill    EScitalopram oxalate (LEXAPRO) 10 MG tablet Take 1.5 tablets (15 mg total) by mouth once daily. (Patient taking differently: Take 5 mg by mouth once daily.) 135 tablet 4    Lactobacillus acidophilus (PROBIOTIC ORAL) Take 1 tablet by mouth once daily.      levETIRAcetam (KEPPRA) 750 MG Tab Take 1 tablet (750 mg total) by mouth 2 (two) times daily. 180 tablet 3    levocetirizine (XYZAL) 5 MG tablet Take 5 mg by mouth every evening.      memantine (NAMENDA TITRATION PACK) tablet pack Follow package directions. 1 packet 0    ascorbic acid, vitamin C, (VITAMIN C) 1000 MG tablet Take 1,000 mg by mouth once daily.      famotidine (PEPCID) 20 MG tablet Take 1 tablet (20 mg total) by mouth 2 (two) times daily. for 14 days 28 tablet 0    multivitamin  (THERAGRAN) per tablet Take 1 tablet by mouth once daily.      ondansetron (ZOFRAN) 8 MG tablet Take 8mg (1 tab) 30-45 minutes prior to chemotherapy dose and every 8 hours as needed (Patient not taking: Reported on 10/20/2023) 30 tablet 3    oxyCODONE (ROXICODONE) 5 MG immediate release tablet Take 1 tablet (5 mg total) by mouth every 4 (four) hours as needed for Pain. (Patient not taking: Reported on 10/20/2023) 30 tablet 0    temozolomide (TEMODAR) 140 MG capsule TAKE ONE 140MG CAPSULE BY MOUTH  WITH THREE 5MG CAPSULES (TOTAL  DOSE 155MG) ONCE DAILY ON AN  EMPTY STOMACH FOR 42 DAYS AS  DIRECTED (Patient not taking: Reported on 10/20/2023.) 21 capsule 0    temozolomide (TEMODAR) 5 MG capsule TAKE THREE 5MG CAPSULES BY MOUTH WITH ONE 140MG CAPSULE (TOTAL  DOSE 155MG) ONCE DAILY ON AN  EMPTY STOMACH FOR 42 DAYS AS  DIRECTED (Patient not taking: Reported on 10/20/2023.) 63 capsule 0     No current facility-administered medications for this visit.      Past Surgical History:   Procedure Laterality Date    BILATERAL INGUINAL HERNIA REPAIR Bilateral 1987    COLONOSCOPY  2014    CRANIOTOMY Left 7/27/2023    Procedure: CRANIOTOMY;  Surgeon: Michele Schuler MD;  Location: [address];  Service: Neurosurgery;  Laterality: Left;  LEFT PARIETAL CRANIOTOMY WITH RESECTION OF BRAIN MASS WITH DR. MIMS    STEREOTACTIC BIOPSY OF BRAIN Left 6/13/2023    Procedure: BIOPSY, BRAIN, STEREOTACTIC - LEFT PARIETAL BRAIN BIOPSY;  Surgeon: Michele Schuler MD;  Location: [facility];  Service: Neurosurgery;  Laterality: Left;      Family History   Problem Relation Age of Onset    Hyperlipidemia Mother     Hyperlipidemia Father     No Known Problems Sister     Hyperlipidemia Brother     Hyperlipidemia Maternal Uncle     Diabetes Maternal Grandmother     Lung cancer Maternal Grandmother       Social History     Tobacco Use    Smoking status: Some Days     Types: Cigars    Smokeless tobacco: Never    Tobacco comments:     Cigar once a year    Substance Use Topics    Alcohol use: No     Comment: occasionally    Drug use: No      Review of Systems   Constitutional:  Negative for fatigue, fever and unexpected weight change.   HENT:  Negative for dental problem and drooling.    Eyes:  Positive for photophobia and visual disturbance.   Respiratory:  Negative for cough and shortness of breath.    Cardiovascular:  Negative for chest pain and leg swelling.   Gastrointestinal:  Negative for abdominal pain and nausea.   Genitourinary:  Negative for difficulty urinating and dysuria.   Musculoskeletal:  Negative for arthralgias and back pain.   Neurological:  Positive for headaches. Negative for dizziness, seizures, weakness and numbness.   Psychiatric/Behavioral:  Negative for agitation and confusion.      KPS: 90      Objective:      Vitals:    10/20/23 1136   BP: 108/69   Pulse: 84       NEUROLOGICAL EXAMINATION:     MENTAL STATUS   Attention: normal. Concentration: normal.   Speech: speech is normal   Level of consciousness: alert    CRANIAL NERVES     CN II   Right visual field deficit: Right homonymous hemainopia.    CN III, IV, VI   Extraocular motions are normal.     CN V   Facial sensation intact.     CN VII   Facial expression full, symmetric.     CN VIII   CN VIII normal.     CN IX, X   CN IX normal.   CN X normal.     CN XI   CN XI normal.     CN XII   CN XII normal.     MOTOR EXAM     Strength   Strength 5/5 throughout.     SENSORY EXAM   Light touch normal.     GAIT AND COORDINATION     Gait  Gait: normal     Coordination   Finger to nose coordination: normal       Assessment:       Problem List Items Addressed This Visit    None            Plan:       Carlos Jacobson is a 43 y.o. male with an oligodendroglioma, WHO grade 2 s/p biopsy presenting for treatment recommendations. Tumor diagnosis was heralded by left foot numbness and tunnel vision. Current symptoms include right homonymous hemianopsia and blurry vision.    Oligodendroglioma  Patient had  additional resection and debulking with Dr. Segura on 07/27/2023 with pathology returning as oligodendroglioma, WHO grade 2, IDH-mutant. Completed chemoradiation. F/u in 1 month after repeat MRI brain, labs in anticipation of adjuvant TMZ.    Peripheral vision loss  Referral to OT    Seizure  Had prior episodic aphasia for 30 seconds at a time with quick return to baseline is a focal seizure. Other potential etiologies include fatigue, anxiety. Continue levetiracetam 750mg bid and monitor.           Candice Mabry MD  Department of Neurology  Neuro-oncology, Movement Disorders

## 2023-10-28 DIAGNOSIS — D49.6 BRAIN TUMOR: ICD-10-CM

## 2023-10-30 DIAGNOSIS — C71.9 OLIGODENDROGLIOMA DETERMINED BY BIOPSY OF BRAIN: Primary | ICD-10-CM

## 2023-10-30 RX ORDER — MEMANTINE HYDROCHLORIDE 10 MG/1
10 TABLET ORAL 2 TIMES DAILY
Qty: 60 TABLET | Refills: 4 | Status: SHIPPED | OUTPATIENT
Start: 2023-10-30 | End: 2023-10-30

## 2023-10-30 RX ORDER — MEMANTINE HYDROCHLORIDE 10 MG/1
10 TABLET ORAL 2 TIMES DAILY
Qty: 60 TABLET | Refills: 4 | Status: SHIPPED | OUTPATIENT
Start: 2023-10-30 | End: 2024-04-02

## 2023-11-06 ENCOUNTER — PATIENT MESSAGE (OUTPATIENT)
Dept: NEUROSURGERY | Facility: CLINIC | Age: 43
End: 2023-11-06
Payer: COMMERCIAL

## 2023-11-07 RX ORDER — DOXYCYCLINE 100 MG/1
100 CAPSULE ORAL EVERY 12 HOURS
Qty: 14 CAPSULE | Refills: 0 | Status: SHIPPED | OUTPATIENT
Start: 2023-11-07 | End: 2023-11-14

## 2023-11-17 ENCOUNTER — HOSPITAL ENCOUNTER (OUTPATIENT)
Dept: RADIOLOGY | Facility: HOSPITAL | Age: 43
Discharge: HOME | End: 2023-11-17
Attending: RADIOLOGY
Payer: COMMERCIAL

## 2023-11-17 DIAGNOSIS — C71.9 OLIGODENDROGLIOMA DETERMINED BY BIOPSY OF BRAIN: ICD-10-CM

## 2023-11-17 PROCEDURE — 25500020 PHARM REV CODE 255: Mod: PO | Performed by: RADIOLOGY

## 2023-11-17 PROCEDURE — 70553 MRI BRAIN STEM W/O & W/DYE: CPT | Mod: TC,PO

## 2023-11-17 PROCEDURE — 70553 MRI BRAIN STEM W/O & W/DYE: CPT | Mod: 26,,, | Performed by: RADIOLOGY

## 2023-11-17 PROCEDURE — 70553 MRI BRAIN W WO CONTRAST: ICD-10-PCS | Mod: 26,,, | Performed by: RADIOLOGY

## 2023-11-17 PROCEDURE — A9585 GADOBUTROL INJECTION: HCPCS | Mod: PO | Performed by: RADIOLOGY

## 2023-11-17 RX ORDER — GADOBUTROL 604.72 MG/ML
8 INJECTION INTRAVENOUS
Status: COMPLETED | OUTPATIENT
Start: 2023-11-17 | End: 2023-11-17

## 2023-11-17 RX ADMIN — GADOBUTROL 8 ML: 604.72 INJECTION INTRAVENOUS at 10:11

## 2023-11-20 ENCOUNTER — OFFICE VISIT (OUTPATIENT)
Dept: RADIATION ONCOLOGY | Facility: CLINIC | Age: 43
End: 2023-11-20
Payer: COMMERCIAL

## 2023-11-20 VITALS
BODY MASS INDEX: 23.73 KG/M2 | OXYGEN SATURATION: 96 % | TEMPERATURE: 98 F | WEIGHT: 179.88 LBS | HEART RATE: 69 BPM | DIASTOLIC BLOOD PRESSURE: 63 MMHG | SYSTOLIC BLOOD PRESSURE: 105 MMHG | RESPIRATION RATE: 16 BRPM

## 2023-11-20 DIAGNOSIS — C71.9 OLIGODENDROGLIOMA DETERMINED BY BIOPSY OF BRAIN: Primary | ICD-10-CM

## 2023-11-20 PROCEDURE — 99213 PR OFFICE/OUTPT VISIT, EST, LEVL III, 20-29 MIN: ICD-10-PCS | Mod: S$GLB,,, | Performed by: RADIOLOGY

## 2023-11-20 PROCEDURE — 3078F DIAST BP <80 MM HG: CPT | Mod: CPTII,S$GLB,, | Performed by: RADIOLOGY

## 2023-11-20 PROCEDURE — 1159F PR MEDICATION LIST DOCUMENTED IN MEDICAL RECORD: ICD-10-PCS | Mod: CPTII,S$GLB,, | Performed by: RADIOLOGY

## 2023-11-20 PROCEDURE — 3078F PR MOST RECENT DIASTOLIC BLOOD PRESSURE < 80 MM HG: ICD-10-PCS | Mod: CPTII,S$GLB,, | Performed by: RADIOLOGY

## 2023-11-20 PROCEDURE — 3008F PR BODY MASS INDEX (BMI) DOCUMENTED: ICD-10-PCS | Mod: CPTII,S$GLB,, | Performed by: RADIOLOGY

## 2023-11-20 PROCEDURE — 1159F MED LIST DOCD IN RCRD: CPT | Mod: CPTII,S$GLB,, | Performed by: RADIOLOGY

## 2023-11-20 PROCEDURE — 99213 OFFICE O/P EST LOW 20 MIN: CPT | Mod: S$GLB,,, | Performed by: RADIOLOGY

## 2023-11-20 PROCEDURE — 99999 PR PBB SHADOW E&M-EST. PATIENT-LVL IV: ICD-10-PCS | Mod: PBBFAC,,, | Performed by: RADIOLOGY

## 2023-11-20 PROCEDURE — 3074F PR MOST RECENT SYSTOLIC BLOOD PRESSURE < 130 MM HG: ICD-10-PCS | Mod: CPTII,S$GLB,, | Performed by: RADIOLOGY

## 2023-11-20 PROCEDURE — 3008F BODY MASS INDEX DOCD: CPT | Mod: CPTII,S$GLB,, | Performed by: RADIOLOGY

## 2023-11-20 PROCEDURE — 99999 PR PBB SHADOW E&M-EST. PATIENT-LVL IV: CPT | Mod: PBBFAC,,, | Performed by: RADIOLOGY

## 2023-11-20 PROCEDURE — 3074F SYST BP LT 130 MM HG: CPT | Mod: CPTII,S$GLB,, | Performed by: RADIOLOGY

## 2023-11-20 NOTE — PROGRESS NOTES
[facility] Department of Radiation Oncology  Follow Up Visit Note    Diagnosis:  Carlos Jacobson is a 43 y.o. 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       Oncologic History:  Oncology History   Oligodendroglioma determined by biopsy of brain   6/29/2023 Initial Diagnosis    Oligodendroglioma determined by biopsy of brain     6/29/2023 Cancer Staged    Staging form: Brain and Spinal Cord, AJCC 8th Edition  - Pathologic stage from 6/29/2023: WHO Grade II     8/18/2023 -  Chemotherapy    Treatment Summary   Plan Name: OP TEMOZOLOMIDE DAILY (6 WEEKS) WITH CONCURRENT RADIATION  Treatment Goal: Control  Status: Active  Start Date: 8/18/2023  End Date: 8/18/2023  Provider: Candice Mabry MD  Chemotherapy: temozolomide (TEMODAR) capsule 155 mg, 75 mg/m2 = 155 mg, Oral, Daily, 1 of 1 cycle, Start date: 8/18/2023, End date: 9/29/2023 9/6/2023 - 10/17/2023 Radiation Therapy    Treating physician: Sena Shelton  Total Dose: 54 Gy  Fractions: 30  Treatment Site Ref. ID Energy Dose/Fx (Gy) #Fx Dose Correction (Gy) Total Dose (Gy) Start Date End Date Elapsed Days   IM Brain Brain 6X 1.8 30 / 30 0 54 9/6/2023 10/17/2023 41           Interval History  The patient presents today for a regularly scheduled follow up visit.  He was last seen in our clinic on 10/17/23 at completion of therapy.   Since that time, developed acneiform rash after shaving head- responded to doxy.      11/17/23 MRI brain: evolving post-op changes in left parietal lobe with overall favorable response, decrease in residual non-enhancing tumor involving corpus callosum, paramedial parietal lobe, temporal lobe.    Plan for adjuvant Temodar with Dr. Mabry       HPI: Patient is a 42 year old male with a couple episodes of tunnel  vision and 2 episodes of numbness and tingling in the left foot, migraine headaches.   Underwent CT head which was concerning for brain mass.      5/26/23 CT chest, abdomen, and pelvis: 1.3cm enhancing left renal lesion suspicious for neoplasm.     5/26/23 MRI Brain: large, ill-defined infiltrative appearing tumor centered in left parietal lobe and mesial temporal lobe, diffuse infiltration of posterior cingulate gyrus and corpus callosum across midline, 3mm midline shift, partial effacement of lateral ventricles.      6/13/23 L parietal biopsy: oligodendroglioma, WHO Grade 2.     On further consideration- patient can be classified as high-risk, low-grade disease (age >40, >6cm, crossing midline), and thus a candidate for concurrent TMZ-radiation therapy followed by adjuvant TMZ for improved outcomes.  Case discussed with Dr. Mabry.  She will obtain fMRI and present at upcoming Neuro-Onc TB to see if further resection/debulking is possible. Additionally, [id number] with Dr. Riley of [facility] was perfomed    7/27/23 Additional resection: oligodendroglioma     Possibility of pregnancy: No  History of prior irradiation: No  History of prior systemic anti-cancer therapy: No  History of collagen vascular disease: No  Implanted electronic device (pacer/defib/nerve stimulator): No    Review of Systems   Review of Systems   HENT:  Positive for tinnitus (~10s, perhaps once/week). Negative for hearing loss.    Eyes:  Positive for blurred vision (with very close objects, now able to read with both eyes). Negative for double vision (possible improvement in peripheral vision).   Respiratory:  Negative for cough and shortness of breath.    Gastrointestinal:  Negative for nausea and vomiting.   Musculoskeletal:  Negative for back pain, joint pain, myalgias and neck pain.   Skin:  Positive for rash (nearly resolved- had acneiform rash, now resolved after Abx).   Neurological:  Positive for dizziness (at times with quick  "standing/movements, improving). Negative for sensory change, speech change, seizures, weakness and headaches.   Psychiatric/Behavioral:  Negative for hallucinations. The patient is not nervous/anxious.      Feeling more "lucid" but still somewhat run down    Social History:  Social History     Tobacco Use    Smoking status: Some Days     Types: Cigars    Smokeless tobacco: Never    Tobacco comments:     Cigar once a year   Substance Use Topics    Alcohol use: No     Comment: occasionally    Drug use: No       Family History:  Cancer-related family history includes Lung cancer in his maternal grandmother.    Exam:  Vitals:    11/20/23 1412   BP: 105/63   Pulse: 69   Resp: 16   Temp: 98.3 °F (36.8 °C)   SpO2: 96%   Weight: 81.6 kg (179 lb 14.3 oz)     Constitutional: Pleasant 43 y.o. male in no acute distress.  Well nourished. Well groomed.   HEENT: Normocephalic and ; treatment related alopecia, rash resolved, well healed incision  Lungs: No audible wheezing.  Normal effort.   Musculoskeletal: No gross MSK deformities. Ambulates  Skin: No rashes appreciated.   Psych: Alert and oriented with appropriate mood and affect.  Neuro:   Grossly normal. CN II-XII intact grossly (visual fields not tested), strength and sensation intact throughout, no deficit in ROM, targeting    Data Review:    Independent Interpretation of Test(s): MRI brain 11/17/23  was personally reviewed as detailed above      Assessment:  Recovering well from acute radiation therapy related toxicities.  Improvement on MRI  ECOG: (1) Restricted in physically strenuous activity, ambulatory and able to do work of light nature    Plan:  Follow up in 4 months  Follow up with Dr. Mabry 12/1/23 for adjuvant Temodar  MRI brain in 1-2 months (per Dr. Mabry schedule)  He was given our contact information, and he was told that he could call our clinic at anytime if he has any questions or concerns.  Follow up with other providers as directed        "

## 2023-12-01 ENCOUNTER — OFFICE VISIT (OUTPATIENT)
Dept: NEUROSURGERY | Facility: CLINIC | Age: 43
End: 2023-12-01
Payer: COMMERCIAL

## 2023-12-01 ENCOUNTER — LAB VISIT (OUTPATIENT)
Dept: LAB | Facility: HOSPITAL | Age: 43
End: 2023-12-01
Attending: PSYCHIATRY & NEUROLOGY
Payer: COMMERCIAL

## 2023-12-01 VITALS
BODY MASS INDEX: 24 KG/M2 | WEIGHT: 181.88 LBS | HEART RATE: 59 BPM | DIASTOLIC BLOOD PRESSURE: 67 MMHG | SYSTOLIC BLOOD PRESSURE: 109 MMHG

## 2023-12-01 DIAGNOSIS — C71.9 OLIGODENDROGLIOMA DETERMINED BY BIOPSY OF BRAIN: ICD-10-CM

## 2023-12-01 DIAGNOSIS — C71.9 OLIGODENDROGLIOMA DETERMINED BY BIOPSY OF BRAIN: Primary | ICD-10-CM

## 2023-12-01 LAB
ALBUMIN SERPL BCP-MCNC: 4.1 G/DL (ref 3.5–5.2)
ALP SERPL-CCNC: 69 U/L (ref 55–135)
ALT SERPL W/O P-5'-P-CCNC: 16 U/L (ref 10–44)
ANION GAP SERPL CALC-SCNC: 9 MMOL/L (ref 8–16)
AST SERPL-CCNC: 16 U/L (ref 10–40)
BASOPHILS # BLD AUTO: 0.01 K/UL (ref 0–0.2)
BASOPHILS NFR BLD: 0.3 % (ref 0–1.9)
BILIRUB SERPL-MCNC: 0.5 MG/DL (ref 0.1–1)
BUN SERPL-MCNC: 12 MG/DL (ref 6–20)
CALCIUM SERPL-MCNC: 9.7 MG/DL (ref 8.7–10.5)
CHLORIDE SERPL-SCNC: 109 MMOL/L (ref 95–110)
CO2 SERPL-SCNC: 25 MMOL/L (ref 23–29)
CREAT SERPL-MCNC: 0.8 MG/DL (ref 0.5–1.4)
DIFFERENTIAL METHOD: ABNORMAL
EOSINOPHIL # BLD AUTO: 0.1 K/UL (ref 0–0.5)
EOSINOPHIL NFR BLD: 1.3 % (ref 0–8)
ERYTHROCYTE [DISTWIDTH] IN BLOOD BY AUTOMATED COUNT: 11.9 % (ref 11.5–14.5)
EST. GFR  (NO RACE VARIABLE): >60 ML/MIN/1.73 M^2
GLUCOSE SERPL-MCNC: 89 MG/DL (ref 70–110)
HCT VFR BLD AUTO: 41.8 % (ref 40–54)
HGB BLD-MCNC: 15 G/DL (ref 14–18)
IMM GRANULOCYTES # BLD AUTO: 0 K/UL (ref 0–0.04)
IMM GRANULOCYTES NFR BLD AUTO: 0 % (ref 0–0.5)
LYMPHOCYTES # BLD AUTO: 1 K/UL (ref 1–4.8)
LYMPHOCYTES NFR BLD: 26 % (ref 18–48)
MCH RBC QN AUTO: 31.6 PG (ref 27–31)
MCHC RBC AUTO-ENTMCNC: 35.9 G/DL (ref 32–36)
MCV RBC AUTO: 88 FL (ref 82–98)
MONOCYTES # BLD AUTO: 0.5 K/UL (ref 0.3–1)
MONOCYTES NFR BLD: 12.6 % (ref 4–15)
NEUTROPHILS # BLD AUTO: 2.2 K/UL (ref 1.8–7.7)
NEUTROPHILS NFR BLD: 59.8 % (ref 38–73)
NRBC BLD-RTO: 0 /100 WBC
PLATELET # BLD AUTO: 197 K/UL (ref 150–450)
PMV BLD AUTO: 9.7 FL (ref 9.2–12.9)
POTASSIUM SERPL-SCNC: 4.2 MMOL/L (ref 3.5–5.1)
PROT SERPL-MCNC: 7.5 G/DL (ref 6–8.4)
RBC # BLD AUTO: 4.74 M/UL (ref 4.6–6.2)
SODIUM SERPL-SCNC: 143 MMOL/L (ref 136–145)
WBC # BLD AUTO: 3.73 K/UL (ref 3.9–12.7)

## 2023-12-01 PROCEDURE — 99215 OFFICE O/P EST HI 40 MIN: CPT | Mod: S$GLB,,, | Performed by: PSYCHIATRY & NEUROLOGY

## 2023-12-01 PROCEDURE — 85025 COMPLETE CBC W/AUTO DIFF WBC: CPT | Performed by: PSYCHIATRY & NEUROLOGY

## 2023-12-01 PROCEDURE — 99999 PR PBB SHADOW E&M-EST. PATIENT-LVL III: ICD-10-PCS | Mod: PBBFAC,,, | Performed by: PSYCHIATRY & NEUROLOGY

## 2023-12-01 PROCEDURE — 3078F PR MOST RECENT DIASTOLIC BLOOD PRESSURE < 80 MM HG: ICD-10-PCS | Mod: CPTII,S$GLB,, | Performed by: PSYCHIATRY & NEUROLOGY

## 2023-12-01 PROCEDURE — 3078F DIAST BP <80 MM HG: CPT | Mod: CPTII,S$GLB,, | Performed by: PSYCHIATRY & NEUROLOGY

## 2023-12-01 PROCEDURE — 36415 COLL VENOUS BLD VENIPUNCTURE: CPT | Performed by: PSYCHIATRY & NEUROLOGY

## 2023-12-01 PROCEDURE — 99999 PR PBB SHADOW E&M-EST. PATIENT-LVL III: CPT | Mod: PBBFAC,,, | Performed by: PSYCHIATRY & NEUROLOGY

## 2023-12-01 PROCEDURE — 80053 COMPREHEN METABOLIC PANEL: CPT | Performed by: PSYCHIATRY & NEUROLOGY

## 2023-12-01 PROCEDURE — 99215 PR OFFICE/OUTPT VISIT, EST, LEVL V, 40-54 MIN: ICD-10-PCS | Mod: S$GLB,,, | Performed by: PSYCHIATRY & NEUROLOGY

## 2023-12-01 PROCEDURE — 1159F PR MEDICATION LIST DOCUMENTED IN MEDICAL RECORD: ICD-10-PCS | Mod: CPTII,S$GLB,, | Performed by: PSYCHIATRY & NEUROLOGY

## 2023-12-01 PROCEDURE — 3074F PR MOST RECENT SYSTOLIC BLOOD PRESSURE < 130 MM HG: ICD-10-PCS | Mod: CPTII,S$GLB,, | Performed by: PSYCHIATRY & NEUROLOGY

## 2023-12-01 PROCEDURE — 3008F PR BODY MASS INDEX (BMI) DOCUMENTED: ICD-10-PCS | Mod: CPTII,S$GLB,, | Performed by: PSYCHIATRY & NEUROLOGY

## 2023-12-01 PROCEDURE — 1159F MED LIST DOCD IN RCRD: CPT | Mod: CPTII,S$GLB,, | Performed by: PSYCHIATRY & NEUROLOGY

## 2023-12-01 PROCEDURE — 3008F BODY MASS INDEX DOCD: CPT | Mod: CPTII,S$GLB,, | Performed by: PSYCHIATRY & NEUROLOGY

## 2023-12-01 PROCEDURE — 3074F SYST BP LT 130 MM HG: CPT | Mod: CPTII,S$GLB,, | Performed by: PSYCHIATRY & NEUROLOGY

## 2023-12-01 RX ORDER — TEMOZOLOMIDE 20 MG/1
60 CAPSULE ORAL DAILY
Qty: 15 CAPSULE | Refills: 0 | Status: ACTIVE | OUTPATIENT
Start: 2023-12-01 | End: 2023-12-06

## 2023-12-01 RX ORDER — TEMOZOLOMIDE 250 MG/1
250 CAPSULE ORAL DAILY
Qty: 5 CAPSULE | Refills: 0 | Status: ACTIVE | OUTPATIENT
Start: 2023-12-01 | End: 2023-12-06

## 2023-12-01 NOTE — PROGRESS NOTES
Subjective:       Patient ID: Carlos Jacobson is a 43 y.o. male.    Chief Complaint: olidendroglioma    HPI  5/2023: presented to urgent care for evaluation of tunnel vision and left foot numbness. CT head showed a left frontal lesion. MRI brain confirmed diffuse, non-enhancing tumor within the temporal and parietal lobes involving the cingulate gyrus and crossing the midline via diffuse infiltration of the posterior corpus callosum  6/13/2023: biopsy of lesion (Michele Schuler) with pathology consistent with oligodendroglioma, WHO grade 2, IDH- and 1p/19q- status pending  7/27/2023: resection of lesion with Dr. Segura consistent with oligodendroglioma, IDH-mutant and 1p/19q-codeleted, residual CNS WHO Grade 2  9/6/2023-10/17/2023: chemoradiation (Sena Shelton)    Feeling overall well. 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 Vision is improving as well. He presents today with his parents.    Past Medical History:   Diagnosis Date    Allergy     Anxiety     Brain tumor     Cancer     Clostridium difficile colitis 04/28/2014    GERD (gastroesophageal reflux disease)     Renal lesion       Current Outpatient Medications   Medication Sig Dispense Refill    EScitalopram oxalate (LEXAPRO) 10 MG tablet Take 1.5 tablets (15 mg total) by mouth once daily. (Patient taking differently: Take 5 mg by mouth once daily.) 135 tablet 4    Lactobacillus acidophilus (PROBIOTIC ORAL) Take 1 tablet by mouth once daily.      levETIRAcetam (KEPPRA) 750 MG Tab Take 1 tablet (750 mg total) by mouth 2 (two) times daily. 180 tablet 3    levocetirizine (XYZAL) 5 MG tablet Take 5 mg by mouth every evening.      memantine (NAMENDA) 10 MG Tab TAKE 1 TABLET (10 MG TOTAL) BY MOUTH 2 (TWO) TIMES DAILY. 60 tablet 4    ascorbic acid, vitamin C, (VITAMIN C) 1000 MG tablet Take 1,000 mg by mouth once daily.      famotidine (PEPCID) 20 MG tablet Take 1 tablet (20 mg total) by mouth 2 (two) times daily. for 14 days (Patient not  taking: Reported on 12/1/2023) 28 tablet 0    multivitamin (THERAGRAN) per tablet Take 1 tablet by mouth once daily.      ondansetron (ZOFRAN) 8 MG tablet Take 8mg (1 tab) 30-45 minutes prior to chemotherapy dose and every 8 hours as needed (Patient not taking: Reported on 10/20/2023) 30 tablet 3    oxyCODONE (ROXICODONE) 5 MG immediate release tablet Take 1 tablet (5 mg total) by mouth every 4 (four) hours as needed for Pain. (Patient not taking: Reported on 10/20/2023) 30 tablet 0    temozolomide (TEMODAR) 20 MG capsule Take 3 capsules (60 mg total) by mouth once daily For days 1-5 of a 28 day cycle with 1 other temozolomide prescription for 310 mg total. 15 capsule 0    temozolomide (TEMODAR) 250 MG capsule Take 1 capsule (250 mg total) by mouth once daily For days 1-5 of a 28 day cycle with 1 other temozolomide prescription for 310 mg total. 5 capsule 0     No current facility-administered medications for this visit.      Past Surgical History:   Procedure Laterality Date    BILATERAL INGUINAL HERNIA REPAIR Bilateral 1987    COLONOSCOPY  2014    CRANIOTOMY Left 7/27/2023    Procedure: CRANIOTOMY;  Surgeon: Michele Schuler MD;  Location: [address];  Service: Neurosurgery;  Laterality: Left;  LEFT PARIETAL CRANIOTOMY WITH RESECTION OF BRAIN MASS WITH DR. MIMS    STEREOTACTIC BIOPSY OF BRAIN Left 6/13/2023    Procedure: BIOPSY, BRAIN, STEREOTACTIC - LEFT PARIETAL BRAIN BIOPSY;  Surgeon: Michele Schuler MD;  Location: [facility];  Service: Neurosurgery;  Laterality: Left;      Family History   Problem Relation Age of Onset    Hyperlipidemia Mother     Hyperlipidemia Father     No Known Problems Sister     Hyperlipidemia Brother     Hyperlipidemia Maternal Uncle     Diabetes Maternal Grandmother     Lung cancer Maternal Grandmother       Social History     Tobacco Use    Smoking status: Some Days     Types: Cigars    Smokeless tobacco: Never    Tobacco comments:     Cigar once a year   Substance Use Topics     Alcohol use: No     Comment: occasionally    Drug use: No      Review of Systems   Constitutional:  Negative for fatigue, fever and unexpected weight change.   HENT:  Negative for dental problem and drooling.    Eyes:  Positive for photophobia and visual disturbance.   Respiratory:  Negative for cough and shortness of breath.    Cardiovascular:  Negative for chest pain and leg swelling.   Gastrointestinal:  Negative for abdominal pain and nausea.   Genitourinary:  Negative for difficulty urinating and dysuria.   Musculoskeletal:  Negative for arthralgias and back pain.   Neurological:  Positive for headaches. Negative for dizziness, seizures, weakness and numbness.   Psychiatric/Behavioral:  Negative for agitation and confusion.      KPS: 90      Objective:      Vitals:    12/01/23 1118   BP: 109/67   Pulse: (!) 59       NEUROLOGICAL EXAMINATION:     MENTAL STATUS   Attention: normal. Concentration: normal.   Speech: speech is normal   Level of consciousness: alert    CRANIAL NERVES     CN II   Right visual field deficit: Right homonymous hemainopia.    CN III, IV, VI   Extraocular motions are normal.     CN V   Facial sensation intact.     CN VII   Facial expression full, symmetric.     CN VIII   CN VIII normal.     CN IX, X   CN IX normal.   CN X normal.     CN XI   CN XI normal.     CN XII   CN XII normal.     MOTOR EXAM     Strength   Strength 5/5 throughout.     SENSORY EXAM   Light touch normal.     GAIT AND COORDINATION     Gait  Gait: normal     Coordination   Finger to nose coordination: normal    MRI brain from 11/17/2023 was personally visualized and compared to prior:  1. Evolving operative changes of tumor resection centered within the left parietal lobe with overall favorable response to treatment compared to prior MRI 08/24/2023.  More uniform appearance of the resection cavity with decreased presumed nonenhancing residual tumor involving the corpus callosum, paramedian parietal lobe, and mesial  temporal lobe and decreased mass effect.  No adverse changes or acute process as above.      Assessment:       Problem List Items Addressed This Visit          Oncology    Oligodendroglioma determined by biopsy of brain - Primary    Relevant Medications    temozolomide (TEMODAR) 20 MG capsule    temozolomide (TEMODAR) 250 MG capsule    Other Relevant Orders    CBC Auto Differential    Comprehensive Metabolic Panel             Plan:       Carlos Jacobson is a 43 y.o. male with an oligodendroglioma, WHO grade 2 s/p biopsy presenting for treatment recommendations. Tumor diagnosis was heralded by left foot numbness and tunnel vision. Current symptoms include right homonymous hemianopsia and blurry vision.    Oligodendroglioma  Patient had additional resection and debulking with Dr. Segura on 07/27/2023 with pathology returning as oligodendroglioma, WHO grade 2, IDH-mutant. Completed chemoradiation. Labs are adequate for initiation of adjuvant TMZ C1 @150mg/m2.    Seizure  Had prior episodic aphasia for 30 seconds at a time with quick return to baseline is a focal seizure. Resolved once AED started. Continue levetiracetam 750mg bid.           Candice Mabry MD  Department of Neurology  Neuro-oncology, Movement Disorders

## 2023-12-18 ENCOUNTER — PATIENT MESSAGE (OUTPATIENT)
Dept: NEUROSURGERY | Facility: CLINIC | Age: 43
End: 2023-12-18
Payer: COMMERCIAL

## 2023-12-29 ENCOUNTER — TELEPHONE (OUTPATIENT)
Dept: NEUROSURGERY | Facility: CLINIC | Age: 43
End: 2023-12-29
Payer: COMMERCIAL

## 2023-12-29 NOTE — TELEPHONE ENCOUNTER
----- Message from Emi Francisco sent at 12/29/2023 11:27 AM CST -----  Regarding: Refill  Contact: [facility] @[phone]  Rx Refill/Request     Is this a Refill or New Rx:  refill     Rx Name and Strength:  temozolomide (TEMODAR) 20 MG capsule   [[id number]]     Preferred Pharmacy with phone number:  [facility] [address] [address]  Phone: [phone] Fax: [phone]        Communication Preference:Please call [facility] [phone]

## 2024-01-02 ENCOUNTER — TELEPHONE (OUTPATIENT)
Dept: OPHTHALMOLOGY | Facility: CLINIC | Age: 44
End: 2024-01-02
Payer: COMMERCIAL

## 2024-01-02 NOTE — TELEPHONE ENCOUNTER
----- Message from Denice Black sent at 1/2/2024 10:57 AM CST -----  Consult/Advisory    Name Of Caller:Nati       Contact Preference:[phone]    Nature of call: Ptn will like to see Dr Carter he has a appt with Dr Farris nothing is showing as of now for Raul please call to assist

## 2024-01-05 ENCOUNTER — OFFICE VISIT (OUTPATIENT)
Dept: NEUROSURGERY | Facility: CLINIC | Age: 44
End: 2024-01-05
Payer: COMMERCIAL

## 2024-01-05 ENCOUNTER — LAB VISIT (OUTPATIENT)
Dept: LAB | Facility: HOSPITAL | Age: 44
End: 2024-01-05
Attending: PSYCHIATRY & NEUROLOGY
Payer: COMMERCIAL

## 2024-01-05 VITALS
OXYGEN SATURATION: 99 % | SYSTOLIC BLOOD PRESSURE: 111 MMHG | WEIGHT: 177 LBS | DIASTOLIC BLOOD PRESSURE: 65 MMHG | BODY MASS INDEX: 23.36 KG/M2 | HEART RATE: 72 BPM

## 2024-01-05 DIAGNOSIS — C71.9 OLIGODENDROGLIOMA DETERMINED BY BIOPSY OF BRAIN: Primary | ICD-10-CM

## 2024-01-05 DIAGNOSIS — H53.459 DECREASED PERIPHERAL VISION, UNSPECIFIED LATERALITY: ICD-10-CM

## 2024-01-05 DIAGNOSIS — R56.9 SEIZURE: ICD-10-CM

## 2024-01-05 DIAGNOSIS — C71.9 OLIGODENDROGLIOMA DETERMINED BY BIOPSY OF BRAIN: ICD-10-CM

## 2024-01-05 PROBLEM — D49.6 BRAIN TUMOR: Status: RESOLVED | Noted: 2023-05-23 | Resolved: 2024-01-05

## 2024-01-05 LAB
ALBUMIN SERPL BCP-MCNC: 4 G/DL (ref 3.5–5.2)
ALP SERPL-CCNC: 62 U/L (ref 55–135)
ALT SERPL W/O P-5'-P-CCNC: 19 U/L (ref 10–44)
ANION GAP SERPL CALC-SCNC: 16 MMOL/L (ref 8–16)
AST SERPL-CCNC: 16 U/L (ref 10–40)
BASOPHILS # BLD AUTO: 0.02 K/UL (ref 0–0.2)
BASOPHILS NFR BLD: 0.3 % (ref 0–1.9)
BILIRUB SERPL-MCNC: 0.6 MG/DL (ref 0.1–1)
BUN SERPL-MCNC: 12 MG/DL (ref 6–20)
CALCIUM SERPL-MCNC: 9.4 MG/DL (ref 8.7–10.5)
CHLORIDE SERPL-SCNC: 104 MMOL/L (ref 95–110)
CO2 SERPL-SCNC: 28 MMOL/L (ref 23–29)
CREAT SERPL-MCNC: 0.9 MG/DL (ref 0.5–1.4)
DIFFERENTIAL METHOD BLD: ABNORMAL
EOSINOPHIL # BLD AUTO: 0.1 K/UL (ref 0–0.5)
EOSINOPHIL NFR BLD: 1.1 % (ref 0–8)
ERYTHROCYTE [DISTWIDTH] IN BLOOD BY AUTOMATED COUNT: 12.4 % (ref 11.5–14.5)
EST. GFR  (NO RACE VARIABLE): >60 ML/MIN/1.73 M^2
GLUCOSE SERPL-MCNC: 78 MG/DL (ref 70–110)
HCT VFR BLD AUTO: 44.1 % (ref 40–54)
HGB BLD-MCNC: 15.3 G/DL (ref 14–18)
IMM GRANULOCYTES # BLD AUTO: 0.01 K/UL (ref 0–0.04)
IMM GRANULOCYTES NFR BLD AUTO: 0.2 % (ref 0–0.5)
LYMPHOCYTES # BLD AUTO: 1 K/UL (ref 1–4.8)
LYMPHOCYTES NFR BLD: 15.4 % (ref 18–48)
MCH RBC QN AUTO: 31 PG (ref 27–31)
MCHC RBC AUTO-ENTMCNC: 34.7 G/DL (ref 32–36)
MCV RBC AUTO: 89 FL (ref 82–98)
MONOCYTES # BLD AUTO: 0.6 K/UL (ref 0.3–1)
MONOCYTES NFR BLD: 9.1 % (ref 4–15)
NEUTROPHILS # BLD AUTO: 4.7 K/UL (ref 1.8–7.7)
NEUTROPHILS NFR BLD: 73.9 % (ref 38–73)
NRBC BLD-RTO: 0 /100 WBC
PLATELET # BLD AUTO: 158 K/UL (ref 150–450)
PMV BLD AUTO: 9.7 FL (ref 9.2–12.9)
POTASSIUM SERPL-SCNC: 3.9 MMOL/L (ref 3.5–5.1)
PROT SERPL-MCNC: 7.3 G/DL (ref 6–8.4)
RBC # BLD AUTO: 4.94 M/UL (ref 4.6–6.2)
SODIUM SERPL-SCNC: 148 MMOL/L (ref 136–145)
WBC # BLD AUTO: 6.36 K/UL (ref 3.9–12.7)

## 2024-01-05 PROCEDURE — 3078F DIAST BP <80 MM HG: CPT | Mod: CPTII,S$GLB,, | Performed by: PSYCHIATRY & NEUROLOGY

## 2024-01-05 PROCEDURE — 99215 OFFICE O/P EST HI 40 MIN: CPT | Mod: S$GLB,,, | Performed by: PSYCHIATRY & NEUROLOGY

## 2024-01-05 PROCEDURE — 36415 COLL VENOUS BLD VENIPUNCTURE: CPT | Performed by: PSYCHIATRY & NEUROLOGY

## 2024-01-05 PROCEDURE — 3074F SYST BP LT 130 MM HG: CPT | Mod: CPTII,S$GLB,, | Performed by: PSYCHIATRY & NEUROLOGY

## 2024-01-05 PROCEDURE — 85025 COMPLETE CBC W/AUTO DIFF WBC: CPT | Performed by: PSYCHIATRY & NEUROLOGY

## 2024-01-05 PROCEDURE — 3008F BODY MASS INDEX DOCD: CPT | Mod: CPTII,S$GLB,, | Performed by: PSYCHIATRY & NEUROLOGY

## 2024-01-05 PROCEDURE — 99999 PR PBB SHADOW E&M-EST. PATIENT-LVL II: CPT | Mod: PBBFAC,,, | Performed by: PSYCHIATRY & NEUROLOGY

## 2024-01-05 PROCEDURE — 80053 COMPREHEN METABOLIC PANEL: CPT | Performed by: PSYCHIATRY & NEUROLOGY

## 2024-01-05 RX ORDER — TEMOZOLOMIDE 100 MG/1
400 CAPSULE ORAL DAILY
Qty: 20 CAPSULE | Refills: 0 | Status: ACTIVE | OUTPATIENT
Start: 2024-01-05 | End: 2024-02-08

## 2024-01-05 RX ORDER — TEMOZOLOMIDE 5 MG/1
5 CAPSULE ORAL DAILY
Qty: 5 CAPSULE | Refills: 0 | Status: ACTIVE | OUTPATIENT
Start: 2024-01-05 | End: 2024-02-08

## 2024-01-09 NOTE — TELEPHONE ENCOUNTER
Called patient. No answer, left voicemail.     This could be from hot shower, change in blood pressure with changing positions. If resolved, continue to monitor.     If this continues and patient returns call, please let me know and I will contact patient again to discuss further.    no

## 2024-01-10 ENCOUNTER — PATIENT MESSAGE (OUTPATIENT)
Dept: NEUROSURGERY | Facility: CLINIC | Age: 44
End: 2024-01-10
Payer: COMMERCIAL

## 2024-01-29 DIAGNOSIS — C71.9 OLIGODENDROGLIOMA DETERMINED BY BIOPSY OF BRAIN: ICD-10-CM

## 2024-02-06 ENCOUNTER — PATIENT MESSAGE (OUTPATIENT)
Dept: NEUROSURGERY | Facility: CLINIC | Age: 44
End: 2024-02-06
Payer: COMMERCIAL

## 2024-02-07 ENCOUNTER — LAB VISIT (OUTPATIENT)
Dept: LAB | Facility: HOSPITAL | Age: 44
End: 2024-02-07
Attending: PSYCHIATRY & NEUROLOGY
Payer: COMMERCIAL

## 2024-02-07 DIAGNOSIS — C71.9 OLIGODENDROGLIOMA DETERMINED BY BIOPSY OF BRAIN: ICD-10-CM

## 2024-02-07 LAB
ALBUMIN SERPL BCP-MCNC: 4.1 G/DL (ref 3.5–5.2)
ALP SERPL-CCNC: 69 U/L (ref 55–135)
ALT SERPL W/O P-5'-P-CCNC: 18 U/L (ref 10–44)
ANION GAP SERPL CALC-SCNC: 7 MMOL/L (ref 8–16)
AST SERPL-CCNC: 19 U/L (ref 10–40)
BASOPHILS # BLD AUTO: 0.01 K/UL (ref 0–0.2)
BASOPHILS NFR BLD: 0.3 % (ref 0–1.9)
BILIRUB SERPL-MCNC: 0.7 MG/DL (ref 0.1–1)
BUN SERPL-MCNC: 14 MG/DL (ref 6–20)
CALCIUM SERPL-MCNC: 9.7 MG/DL (ref 8.7–10.5)
CHLORIDE SERPL-SCNC: 107 MMOL/L (ref 95–110)
CO2 SERPL-SCNC: 26 MMOL/L (ref 23–29)
CREAT SERPL-MCNC: 0.9 MG/DL (ref 0.5–1.4)
DIFFERENTIAL METHOD BLD: ABNORMAL
EOSINOPHIL # BLD AUTO: 0 K/UL (ref 0–0.5)
EOSINOPHIL NFR BLD: 0.6 % (ref 0–8)
ERYTHROCYTE [DISTWIDTH] IN BLOOD BY AUTOMATED COUNT: 13.2 % (ref 11.5–14.5)
EST. GFR  (NO RACE VARIABLE): >60 ML/MIN/1.73 M^2
GLUCOSE SERPL-MCNC: 88 MG/DL (ref 70–110)
HCT VFR BLD AUTO: 40.3 % (ref 40–54)
HGB BLD-MCNC: 13.8 G/DL (ref 14–18)
IMM GRANULOCYTES # BLD AUTO: 0.01 K/UL (ref 0–0.04)
IMM GRANULOCYTES NFR BLD AUTO: 0.3 % (ref 0–0.5)
LYMPHOCYTES # BLD AUTO: 1 K/UL (ref 1–4.8)
LYMPHOCYTES NFR BLD: 27.7 % (ref 18–48)
MCH RBC QN AUTO: 31.2 PG (ref 27–31)
MCHC RBC AUTO-ENTMCNC: 34.2 G/DL (ref 32–36)
MCV RBC AUTO: 91 FL (ref 82–98)
MONOCYTES # BLD AUTO: 0.4 K/UL (ref 0.3–1)
MONOCYTES NFR BLD: 12.3 % (ref 4–15)
NEUTROPHILS # BLD AUTO: 2.1 K/UL (ref 1.8–7.7)
NEUTROPHILS NFR BLD: 58.8 % (ref 38–73)
NRBC BLD-RTO: 0 /100 WBC
PLATELET # BLD AUTO: 139 K/UL (ref 150–450)
PMV BLD AUTO: 9.9 FL (ref 9.2–12.9)
POTASSIUM SERPL-SCNC: 4.2 MMOL/L (ref 3.5–5.1)
PROT SERPL-MCNC: 7.3 G/DL (ref 6–8.4)
RBC # BLD AUTO: 4.42 M/UL (ref 4.6–6.2)
SODIUM SERPL-SCNC: 140 MMOL/L (ref 136–145)
WBC # BLD AUTO: 3.5 K/UL (ref 3.9–12.7)

## 2024-02-07 PROCEDURE — 85025 COMPLETE CBC W/AUTO DIFF WBC: CPT | Performed by: PSYCHIATRY & NEUROLOGY

## 2024-02-07 PROCEDURE — 80053 COMPREHEN METABOLIC PANEL: CPT | Performed by: PSYCHIATRY & NEUROLOGY

## 2024-02-07 PROCEDURE — 36415 COLL VENOUS BLD VENIPUNCTURE: CPT | Performed by: PSYCHIATRY & NEUROLOGY

## 2024-02-08 ENCOUNTER — OFFICE VISIT (OUTPATIENT)
Dept: NEUROLOGY | Facility: CLINIC | Age: 44
End: 2024-02-08
Payer: COMMERCIAL

## 2024-02-08 DIAGNOSIS — C71.9 OLIGODENDROGLIOMA DETERMINED BY BIOPSY OF BRAIN: Primary | ICD-10-CM

## 2024-02-08 DIAGNOSIS — R56.9 SEIZURE: ICD-10-CM

## 2024-02-08 DIAGNOSIS — H53.9 VISION CHANGES: ICD-10-CM

## 2024-02-08 PROCEDURE — 99215 OFFICE O/P EST HI 40 MIN: CPT | Mod: 95,,, | Performed by: PSYCHIATRY & NEUROLOGY

## 2024-02-08 RX ORDER — TEMOZOLOMIDE 5 MG/1
CAPSULE ORAL
Qty: 5 CAPSULE | Refills: 0 | Status: SHIPPED | OUTPATIENT
Start: 2024-02-08 | End: 2024-03-14 | Stop reason: SDUPTHER

## 2024-02-08 RX ORDER — TEMOZOLOMIDE 100 MG/1
CAPSULE ORAL
Qty: 20 CAPSULE | Refills: 0 | Status: SHIPPED | OUTPATIENT
Start: 2024-02-08 | End: 2024-03-14 | Stop reason: SDUPTHER

## 2024-02-08 NOTE — PROGRESS NOTES
Subjective:       Patient ID: Carlos Jacobson is a 43 y.o. male.    Chief Complaint: olidendroglioma    HPI  5/2023: presented to urgent care for evaluation of tunnel vision and left foot numbness. CT head showed a left frontal lesion. MRI brain confirmed diffuse, non-enhancing tumor within the temporal and parietal lobes involving the cingulate gyrus and crossing the midline via diffuse infiltration of the posterior corpus callosum  6/13/2023: biopsy of lesion (Michele Schuler) with pathology consistent with oligodendroglioma, WHO grade 2, IDH- and 1p/19q- status pending  7/27/2023: resection of lesion with Dr. Segura consistent with oligodendroglioma, IDH-mutant and 1p/19q-codeleted, residual CNS WHO Grade 2  9/6/2023-10/17/2023: chemoradiation (Sena Shelton)  12/2023: C1 TMZ @150mg/m2  1/2024: C2 TMZ @200mg/m2    Edison is overall feeling well. He presents today with his parents on the phone. He is continuing to work. No new symptoms. Tolerated C2 - notices he has some malaise starting around day 3 that improves over the course of days once the 5 days of chemotherapy are completed. He feels his vision is improving, and he has started running again.    Past Medical History:   Diagnosis Date    Allergy     Anxiety     Brain tumor     Cancer     Clostridium difficile colitis 04/28/2014    GERD (gastroesophageal reflux disease)     Renal lesion       Current Outpatient Medications   Medication Sig Dispense Refill    ascorbic acid, vitamin C, (VITAMIN C) 1000 MG tablet Take 1,000 mg by mouth once daily.      EScitalopram oxalate (LEXAPRO) 10 MG tablet Take 1.5 tablets (15 mg total) by mouth once daily. (Patient taking differently: Take 5 mg by mouth once daily.) 135 tablet 4    Lactobacillus acidophilus (PROBIOTIC ORAL) Take 1 tablet by mouth once daily.      levETIRAcetam (KEPPRA) 750 MG Tab Take 1 tablet (750 mg total) by mouth 2 (two) times daily. 180 tablet 3    levocetirizine (XYZAL) 5 MG tablet Take 5 mg by  mouth every evening.      memantine (NAMENDA) 10 MG Tab TAKE 1 TABLET (10 MG TOTAL) BY MOUTH 2 (TWO) TIMES DAILY. 60 tablet 4    multivitamin (THERAGRAN) per tablet Take 1 tablet by mouth once daily.      ondansetron (ZOFRAN) 8 MG tablet Take 8mg (1 tab) 30-45 minutes prior to chemotherapy dose and every 8 hours as needed (Patient not taking: Reported on 10/20/2023) 30 tablet 3    temozolomide (TEMODAR) 100 MG capsule TAKE FOUR 100MG CAPSULES WITH  ONE 5MG CAPSULE (405MG TOTAL) BY MOUTH DAILY FOR 5 DAYS OF 28 DAY CYCLE 20 capsule 0    temozolomide (TEMODAR) 5 MG capsule TAKE ONE 5MG CAPSULE WITH FOUR  100MG CAPSULES (405MG TOTAL) BY  MOUTH DAILY FOR 5 DAYS OF 28 DAY CYCLE 5 capsule 0     No current facility-administered medications for this visit.      Past Surgical History:   Procedure Laterality Date    BILATERAL INGUINAL HERNIA REPAIR Bilateral 1987    COLONOSCOPY  2014    CRANIOTOMY Left 7/27/2023    Procedure: CRANIOTOMY;  Surgeon: Michele Schuler MD;  Location: [address];  Service: Neurosurgery;  Laterality: Left;  LEFT PARIETAL CRANIOTOMY WITH RESECTION OF BRAIN MASS WITH DR. MIMS    STEREOTACTIC BIOPSY OF BRAIN Left 6/13/2023    Procedure: BIOPSY, BRAIN, STEREOTACTIC - LEFT PARIETAL BRAIN BIOPSY;  Surgeon: Michele Schuler MD;  Location: [facility];  Service: Neurosurgery;  Laterality: Left;      Family History   Problem Relation Age of Onset    Hyperlipidemia Mother     Hyperlipidemia Father     No Known Problems Sister     Hyperlipidemia Brother     Hyperlipidemia Maternal Uncle     Diabetes Maternal Grandmother     Lung cancer Maternal Grandmother       Social History     Tobacco Use    Smoking status: Some Days     Types: Cigars    Smokeless tobacco: Never    Tobacco comments:     Cigar once a year   Substance Use Topics    Alcohol use: No     Comment: occasionally    Drug use: No      Review of Systems   Constitutional:  Negative for fatigue, fever and unexpected weight change.   HENT:  Negative  for dental problem and drooling.    Eyes:  Positive for photophobia and visual disturbance.   Respiratory:  Negative for cough and shortness of breath.    Cardiovascular:  Negative for chest pain and leg swelling.   Gastrointestinal:  Negative for abdominal pain and nausea.   Genitourinary:  Negative for difficulty urinating and dysuria.   Musculoskeletal:  Negative for arthralgias and back pain.   Neurological:  Positive for headaches. Negative for dizziness, seizures, weakness and numbness.   Psychiatric/Behavioral:  Negative for agitation and confusion.      KPS: 90      Objective:      There were no vitals filed for this visit.  Telehealth visit    (Prior)  NEUROLOGICAL EXAMINATION:     MENTAL STATUS   Attention: normal. Concentration: normal.   Speech: speech is normal   Level of consciousness: alert    CRANIAL NERVES     CN II   Right visual field deficit: Right homonymous hemainopia.    CN III, IV, VI   Extraocular motions are normal.     CN V   Facial sensation intact.     CN VII   Facial expression full, symmetric.     CN VIII   CN VIII normal.     CN IX, X   CN IX normal.   CN X normal.     CN XI   CN XI normal.     CN XII   CN XII normal.     MOTOR EXAM     Strength   Strength 5/5 throughout.     SENSORY EXAM   Light touch normal.     GAIT AND COORDINATION     Gait  Gait: normal     Coordination   Finger to nose coordination: normal    MRI brain from 11/17/2023 was personally visualized and compared to prior:  1. Evolving operative changes of tumor resection centered within the left parietal lobe with overall favorable response to treatment compared to prior MRI 08/24/2023.  More uniform appearance of the resection cavity with decreased presumed nonenhancing residual tumor involving the corpus callosum, paramedian parietal lobe, and mesial temporal lobe and decreased mass effect.  No adverse changes or acute process as above.      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 male with an oligodendroglioma, WHO grade 2 s/p subtotal resection, chemoradiation, and ongoing adjuvant TMZ presenting for follow up. Tumor diagnosis was heralded by left foot numbness and tunnel vision. Current symptoms include visual changes.    Oligodendroglioma  Undergoing adjuvant TMZ. Labs are adequate for continuation of adjuvant TMZ C3 @200mg/m2. He can start 2/12/2024. F/u in 4 weeks with CBC/CMP and surveillance MRI brain in anticipation of C4 TMZ.    Seizure  Had prior episodic aphasia for 30 seconds at a time with quick return to baseline is a focal seizure. Resolved once AED started. Continue levetiracetam 750mg bid.    Vision changes/decreased peripheral vision  Follows with neuro-ophthalmology                    Candice Mabry MD  Department of Neurology  Neuro-oncology, Movement Disorders

## 2024-02-29 ENCOUNTER — TELEPHONE (OUTPATIENT)
Dept: OPHTHALMOLOGY | Facility: CLINIC | Age: 44
End: 2024-02-29
Payer: COMMERCIAL

## 2024-02-29 NOTE — TELEPHONE ENCOUNTER
----- Message from Edwina Howard sent at 2/29/2024  1:01 PM CST -----  Regarding: Pink Eye  Patient called in regards to having pink eye in right eye for 2 days now.     Please call back to further [phone]

## 2024-03-08 ENCOUNTER — HOSPITAL ENCOUNTER (OUTPATIENT)
Dept: RADIOLOGY | Facility: HOSPITAL | Age: 44
Discharge: HOME | End: 2024-03-08
Attending: PSYCHIATRY & NEUROLOGY
Payer: COMMERCIAL

## 2024-03-08 DIAGNOSIS — C71.9 OLIGODENDROGLIOMA DETERMINED BY BIOPSY OF BRAIN: ICD-10-CM

## 2024-03-08 PROCEDURE — 25500020 PHARM REV CODE 255: Mod: PO | Performed by: PSYCHIATRY & NEUROLOGY

## 2024-03-08 PROCEDURE — 70553 MRI BRAIN STEM W/O & W/DYE: CPT | Mod: 26,,, | Performed by: RADIOLOGY

## 2024-03-08 PROCEDURE — A9585 GADOBUTROL INJECTION: HCPCS | Mod: PO | Performed by: PSYCHIATRY & NEUROLOGY

## 2024-03-08 PROCEDURE — 70553 MRI BRAIN STEM W/O & W/DYE: CPT | Mod: TC,PO

## 2024-03-08 RX ORDER — GADOBUTROL 604.72 MG/ML
8 INJECTION INTRAVENOUS
Status: COMPLETED | OUTPATIENT
Start: 2024-03-08 | End: 2024-03-08

## 2024-03-08 RX ADMIN — GADOBUTROL 8 ML: 604.72 INJECTION INTRAVENOUS at 10:03

## 2024-03-12 ENCOUNTER — OFFICE VISIT (OUTPATIENT)
Dept: NEUROSURGERY | Facility: CLINIC | Age: 44
End: 2024-03-12
Payer: COMMERCIAL

## 2024-03-12 ENCOUNTER — PATIENT MESSAGE (OUTPATIENT)
Dept: NEUROSURGERY | Facility: CLINIC | Age: 44
End: 2024-03-12

## 2024-03-12 DIAGNOSIS — C71.9 OLIGODENDROGLIOMA DETERMINED BY BIOPSY OF BRAIN: Primary | ICD-10-CM

## 2024-03-12 DIAGNOSIS — R56.9 SEIZURE: ICD-10-CM

## 2024-03-12 DIAGNOSIS — H53.9 VISION CHANGES: ICD-10-CM

## 2024-03-12 PROCEDURE — 99215 OFFICE O/P EST HI 40 MIN: CPT | Mod: 95,,, | Performed by: PSYCHIATRY & NEUROLOGY

## 2024-03-12 NOTE — PROGRESS NOTES
Subjective:       Patient ID: Carlos Jacobson is a 43 y.o. male.    Chief Complaint: olidendroglioma    HPI  5/2023: presented to urgent care for evaluation of tunnel vision and left foot numbness. CT head showed a left frontal lesion. MRI brain confirmed diffuse, non-enhancing tumor within the temporal and parietal lobes involving the cingulate gyrus and crossing the midline via diffuse infiltration of the posterior corpus callosum  6/13/2023: biopsy of lesion (Michele Schuler) with pathology consistent with oligodendroglioma, WHO grade 2  7/27/2023: resection of lesion with Dr. Segura consistent with oligodendroglioma, IDH-mutant and 1p/19q-codeleted, residual CNS WHO Grade 2  9/6/2023-10/17/2023: chemoradiation (Sena Shelton)  12/2023: C1 TMZ @150mg/m2  1/2024: C2 TMZ @200mg/m2  2/2024: C3 TMZ @200mg/m2    Edison is overall feeling well. He presents today with his parents on the phone. He is continuing to work. No new symptoms. Tolerated C3 with improvement in fatigue.    Past Medical History:   Diagnosis Date    Allergy     Anxiety     Brain tumor     Cancer     Clostridium difficile colitis 04/28/2014    GERD (gastroesophageal reflux disease)     Renal lesion       Current Outpatient Medications   Medication Sig Dispense Refill    ascorbic acid, vitamin C, (VITAMIN C) 1000 MG tablet Take 1,000 mg by mouth once daily.      EScitalopram oxalate (LEXAPRO) 10 MG tablet Take 1.5 tablets (15 mg total) by mouth once daily. (Patient taking differently: Take 5 mg by mouth once daily.) 135 tablet 4    Lactobacillus acidophilus (PROBIOTIC ORAL) Take 1 tablet by mouth once daily.      levETIRAcetam (KEPPRA) 750 MG Tab Take 1 tablet (750 mg total) by mouth 2 (two) times daily. 180 tablet 3    levocetirizine (XYZAL) 5 MG tablet Take 5 mg by mouth every evening.      memantine (NAMENDA) 10 MG Tab TAKE 1 TABLET (10 MG TOTAL) BY MOUTH 2 (TWO) TIMES DAILY. 60 tablet 4    multivitamin (THERAGRAN) per tablet Take 1 tablet by mouth  once daily.      ondansetron (ZOFRAN) 8 MG tablet Take 8mg (1 tab) 30-45 minutes prior to chemotherapy dose and every 8 hours as needed (Patient not taking: Reported on 10/20/2023) 30 tablet 3    temozolomide (TEMODAR) 100 MG capsule TAKE FOUR 100MG CAPSULES WITH  ONE 5MG CAPSULE (405MG TOTAL) BY MOUTH DAILY FOR 5 DAYS OF 28 DAY CYCLE 20 capsule 0    temozolomide (TEMODAR) 5 MG capsule TAKE ONE 5MG CAPSULE WITH FOUR  100MG CAPSULES (405MG TOTAL) BY  MOUTH DAILY FOR 5 DAYS OF 28 DAY CYCLE 5 capsule 0     No current facility-administered medications for this visit.      Past Surgical History:   Procedure Laterality Date    BILATERAL INGUINAL HERNIA REPAIR Bilateral 1987    COLONOSCOPY  2014    CRANIOTOMY Left 7/27/2023    Procedure: CRANIOTOMY;  Surgeon: Michele Schuler MD;  Location: [address];  Service: Neurosurgery;  Laterality: Left;  LEFT PARIETAL CRANIOTOMY WITH RESECTION OF BRAIN MASS WITH DR. MIMS    STEREOTACTIC BIOPSY OF BRAIN Left 6/13/2023    Procedure: BIOPSY, BRAIN, STEREOTACTIC - LEFT PARIETAL BRAIN BIOPSY;  Surgeon: Michele Schuler MD;  Location: [facility];  Service: Neurosurgery;  Laterality: Left;      Family History   Problem Relation Age of Onset    Hyperlipidemia Mother     Hyperlipidemia Father     No Known Problems Sister     Hyperlipidemia Brother     Hyperlipidemia Maternal Uncle     Diabetes Maternal Grandmother     Lung cancer Maternal Grandmother       Social History     Tobacco Use    Smoking status: Some Days     Types: Cigars    Smokeless tobacco: Never    Tobacco comments:     Cigar once a year   Substance Use Topics    Alcohol use: No     Comment: occasionally    Drug use: No      Review of Systems   Constitutional:  Negative for fatigue, fever and unexpected weight change.   HENT:  Negative for dental problem and drooling.    Eyes:  Positive for photophobia and visual disturbance.   Respiratory:  Negative for cough and shortness of breath.    Cardiovascular:  Negative for  chest pain and leg swelling.   Gastrointestinal:  Negative for abdominal pain and nausea.   Genitourinary:  Negative for difficulty urinating and dysuria.   Musculoskeletal:  Negative for arthralgias and back pain.   Neurological:  Positive for headaches. Negative for dizziness, seizures, weakness and numbness.   Psychiatric/Behavioral:  Negative for agitation and confusion.      KPS: 90      Objective:      There were no vitals filed for this visit.  Telehealth visit    (Prior)  NEUROLOGICAL EXAMINATION:     MENTAL STATUS   Attention: normal. Concentration: normal.   Speech: speech is normal   Level of consciousness: alert    CRANIAL NERVES     CN II   Right visual field deficit: Right homonymous hemainopia.    CN III, IV, VI   Extraocular motions are normal.     CN V   Facial sensation intact.     CN VII   Facial expression full, symmetric.     CN VIII   CN VIII normal.     CN IX, X   CN IX normal.   CN X normal.     CN XI   CN XI normal.     CN XII   CN XII normal.     MOTOR EXAM     Strength   Strength 5/5 throughout.     SENSORY EXAM   Light touch normal.     GAIT AND COORDINATION     Gait  Gait: normal     Coordination   Finger to nose coordination: normal    MRI brain from 3/8/2024 was personally visualized and compared to prior. Per my read, there is no evidence of tumor progression.      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 male with an oligodendroglioma, WHO grade 2 s/p subtotal resection, chemoradiation, and ongoing adjuvant TMZ presenting for follow up. Tumor diagnosis was heralded by left foot numbness and tunnel vision. Current symptoms include visual changes.    Oligodendroglioma  Undergoing adjuvant TMZ. Most recent MRI brain is  stable. Labs are not adequate for continuation of adjuvant TMZ C4 @200mg/m2 due to mild neutropenia to 1.4k. He will repeat CBC in the next 1-2 days; if adequate, will prescribe C4 @200mg/m2. F/u in 4-5 weeks with CBC/CMP in anticipation of C5 TMZ.    Seizure  Had episodic aphasia for 30 seconds at a time with quick return to baseline consistent with focal seizure. Resolved once AED started. Continue levetiracetam 750mg bid.    Vision changes/decreased peripheral vision  Follows with neuro-ophthalmology                The patient location is: work in [place]  The chief complaint leading to consultation is: oligodendroglioma    Visit type: audiovisual    Face to Face time with patient: 5 minutes      Each patient to whom he or she provides medical services by telemedicine is:  (1) informed of the relationship between the physician and patient and the respective role of any other health care provider with respect to management of the patient; and (2) notified that he or she may decline to receive medical services by telemedicine and may withdraw from such care at any time.    Notes:       Candice Mabry MD  Department of Neurology  Neuro-oncology, Movement Disorders

## 2024-03-14 ENCOUNTER — LAB VISIT (OUTPATIENT)
Dept: LAB | Facility: HOSPITAL | Age: 44
End: 2024-03-14
Attending: PSYCHIATRY & NEUROLOGY
Payer: COMMERCIAL

## 2024-03-14 DIAGNOSIS — C71.9 OLIGODENDROGLIOMA DETERMINED BY BIOPSY OF BRAIN: ICD-10-CM

## 2024-03-14 LAB
BASOPHILS # BLD AUTO: 0.01 K/UL (ref 0–0.2)
BASOPHILS NFR BLD: 0.3 % (ref 0–1.9)
DIFFERENTIAL METHOD BLD: ABNORMAL
EOSINOPHIL # BLD AUTO: 0 K/UL (ref 0–0.5)
EOSINOPHIL NFR BLD: 0.9 % (ref 0–8)
ERYTHROCYTE [DISTWIDTH] IN BLOOD BY AUTOMATED COUNT: 13.8 % (ref 11.5–14.5)
HCT VFR BLD AUTO: 39.8 % (ref 40–54)
HGB BLD-MCNC: 13.6 G/DL (ref 14–18)
IMM GRANULOCYTES # BLD AUTO: 0.01 K/UL (ref 0–0.04)
IMM GRANULOCYTES NFR BLD AUTO: 0.3 % (ref 0–0.5)
LYMPHOCYTES # BLD AUTO: 0.8 K/UL (ref 1–4.8)
LYMPHOCYTES NFR BLD: 23.7 % (ref 18–48)
MCH RBC QN AUTO: 32.2 PG (ref 27–31)
MCHC RBC AUTO-ENTMCNC: 34.2 G/DL (ref 32–36)
MCV RBC AUTO: 94 FL (ref 82–98)
MONOCYTES # BLD AUTO: 0.4 K/UL (ref 0.3–1)
MONOCYTES NFR BLD: 10.1 % (ref 4–15)
NEUTROPHILS # BLD AUTO: 2.2 K/UL (ref 1.8–7.7)
NEUTROPHILS NFR BLD: 64.7 % (ref 38–73)
NRBC BLD-RTO: 0 /100 WBC
PLATELET # BLD AUTO: 163 K/UL (ref 150–450)
PMV BLD AUTO: 9.6 FL (ref 9.2–12.9)
RBC # BLD AUTO: 4.23 M/UL (ref 4.6–6.2)
WBC # BLD AUTO: 3.46 K/UL (ref 3.9–12.7)

## 2024-03-14 PROCEDURE — 85025 COMPLETE CBC W/AUTO DIFF WBC: CPT | Performed by: PSYCHIATRY & NEUROLOGY

## 2024-03-14 PROCEDURE — 36415 COLL VENOUS BLD VENIPUNCTURE: CPT | Performed by: PSYCHIATRY & NEUROLOGY

## 2024-03-14 RX ORDER — TEMOZOLOMIDE 100 MG/1
400 CAPSULE ORAL DAILY
Qty: 20 CAPSULE | Refills: 0 | Status: ACTIVE | OUTPATIENT
Start: 2024-03-14 | End: 2024-04-12

## 2024-03-14 RX ORDER — TEMOZOLOMIDE 5 MG/1
5 CAPSULE ORAL DAILY
Qty: 5 CAPSULE | Refills: 0 | Status: ACTIVE | OUTPATIENT
Start: 2024-03-14 | End: 2024-04-12

## 2024-03-19 ENCOUNTER — OFFICE VISIT (OUTPATIENT)
Dept: RADIATION ONCOLOGY | Facility: CLINIC | Age: 44
End: 2024-03-19
Payer: COMMERCIAL

## 2024-03-19 VITALS
OXYGEN SATURATION: 97 % | BODY MASS INDEX: 22.63 KG/M2 | DIASTOLIC BLOOD PRESSURE: 85 MMHG | WEIGHT: 171.5 LBS | TEMPERATURE: 98 F | HEART RATE: 74 BPM | SYSTOLIC BLOOD PRESSURE: 123 MMHG | RESPIRATION RATE: 16 BRPM

## 2024-03-19 DIAGNOSIS — C71.9 OLIGODENDROGLIOMA DETERMINED BY BIOPSY OF BRAIN: Primary | ICD-10-CM

## 2024-03-19 PROCEDURE — 99999 PR PBB SHADOW E&M-EST. PATIENT-LVL IV: CPT | Mod: PBBFAC,,, | Performed by: RADIOLOGY

## 2024-03-19 PROCEDURE — 3074F SYST BP LT 130 MM HG: CPT | Mod: CPTII,S$GLB,, | Performed by: RADIOLOGY

## 2024-03-19 PROCEDURE — 3079F DIAST BP 80-89 MM HG: CPT | Mod: CPTII,S$GLB,, | Performed by: RADIOLOGY

## 2024-03-19 PROCEDURE — 3008F BODY MASS INDEX DOCD: CPT | Mod: CPTII,S$GLB,, | Performed by: RADIOLOGY

## 2024-03-19 PROCEDURE — 99213 OFFICE O/P EST LOW 20 MIN: CPT | Mod: S$GLB,,, | Performed by: RADIOLOGY

## 2024-03-19 PROCEDURE — 1159F MED LIST DOCD IN RCRD: CPT | Mod: CPTII,S$GLB,, | Performed by: RADIOLOGY

## 2024-03-19 NOTE — PROGRESS NOTES
[facility] Department of Radiation Oncology  Follow Up Visit Note    Diagnosis:  Carlos Jacobson is a 43 y.o. 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       Oncologic History:  Oncology History   Oligodendroglioma determined by biopsy of brain   6/29/2023 Initial Diagnosis    Oligodendroglioma determined by biopsy of brain     6/29/2023 Cancer Staged    Staging form: Brain and Spinal Cord, AJCC 8th Edition  - Pathologic stage from 6/29/2023: WHO Grade II     8/18/2023 -  Chemotherapy    Treatment Summary   Plan Name: OP TEMOZOLOMIDE DAILY (6 WEEKS) WITH CONCURRENT RADIATION  Treatment Goal: Control  Status: Active  Start Date: 8/18/2023  End Date: 8/18/2023  Provider: Candice Mabry MD  Chemotherapy: temozolomide (TEMODAR) capsule 155 mg, 75 mg/m2 = 155 mg, Oral, Daily, 1 of 1 cycle, Start date: 8/18/2023, End date: 9/29/2023 9/6/2023 - 10/17/2023 Radiation Therapy    Treating physician: Sena Shelton  Total Dose: 54 Gy  Fractions: 30  Treatment Site Ref. ID Energy Dose/Fx (Gy) #Fx Dose Correction (Gy) Total Dose (Gy) Start Date End Date Elapsed Days   IM Brain Brain 6X 1.8 30 / 30 0 54 9/6/2023 10/17/2023 41           Interval History  The patient presents today for a regularly scheduled follow up visit.  He was last seen in our clinic on 11/20/23 at completion of therapy.   Since that time,   continued adjuvant temozolamide with Dr. Mabry.     3/8/24 MRI brain: decreased non-enhancing T2 signal abnormality along left parietal lobe     Vision improving (now able to read with both eyes, medium range has some issues, ongoing right peripheral field deficit, but improving), never had issues with distant vision.    HPI: Patient is a 42 year old male with a couple episodes of tunnel vision  and 2 episodes of numbness and tingling in the left foot, migraine headaches.   Underwent CT head which was concerning for brain mass.      5/26/23 CT chest, abdomen, and pelvis: 1.3cm enhancing left renal lesion suspicious for neoplasm.     5/26/23 MRI Brain: large, ill-defined infiltrative appearing tumor centered in left parietal lobe and mesial temporal lobe, diffuse infiltration of posterior cingulate gyrus and corpus callosum across midline, 3mm midline shift, partial effacement of lateral ventricles.      6/13/23 L parietal biopsy: oligodendroglioma, WHO Grade 2.     On further consideration- patient can be classified as high-risk, low-grade disease (age >40, >6cm, crossing midline), and thus a candidate for concurrent TMZ-radiation therapy followed by adjuvant TMZ for improved outcomes.  Case discussed with Dr. Mabry.  She will obtain fMRI and present at upcoming Neuro-Onc TB to see if further resection/debulking is possible. Additionally, [id number] with Dr. Riley of [facility] was perfomed    7/27/23 Additional resection: oligodendroglioma    11/17/23 MRI brain: evolving post-op changes in left parietal lobe with overall favorable response, decrease in residual non-enhancing tumor involving corpus callosum, paramedial parietal lobe, temporal lobe.    developed acneiform rash after shaving head- responded to doxy.    Adjuvant Temodar with Dr. Mabry    Possibility of pregnancy: No  History of prior irradiation: No  History of prior systemic anti-cancer therapy: No  History of collagen vascular disease: No  Implanted electronic device (pacer/defib/nerve stimulator): No    Review of Systems   Review of Systems   Constitutional:  Negative for chills, fever and malaise/fatigue.   HENT:  Positive for tinnitus (~10s, perhaps once/week, improving). Negative for hearing loss.    Eyes:  Positive for blurred vision (blurry at mid-distant). Negative for double vision (resolved).   Respiratory:  Negative for cough and  "shortness of breath.    Gastrointestinal:  Positive for constipation (from Zofran). Negative for nausea and vomiting.   Musculoskeletal:  Negative for back pain, joint pain, myalgias and neck pain.   Skin:  Negative for rash (resolved).   Neurological:  Positive for dizziness (rare, with standing). Negative for sensory change, speech change, seizures, weakness and headaches.   Psychiatric/Behavioral:  Negative for hallucinations. The patient is not nervous/anxious.      Feeling more "lucid" but still somewhat run down    Social History:  Social History     Tobacco Use    Smoking status: Some Days     Types: Cigars    Smokeless tobacco: Never    Tobacco comments:     Cigar once a year   Substance Use Topics    Alcohol use: No     Comment: occasionally    Drug use: No       Family History:  Cancer-related family history includes Lung cancer in his maternal grandmother.    Exam:  Vitals:    03/19/24 1350   BP: 123/85   Pulse: 74   Resp: 16   Temp: 98 °F (36.7 °C)   SpO2: 97%   Weight: 77.8 kg (171 lb 8.3 oz)     Constitutional: Pleasant 43 y.o. male in no acute distress.  Well nourished. Well groomed.   HEENT: Normocephalic and ; treatment related alopecia, rash resolved, well healed incision  Lungs: No audible wheezing.  Normal effort.   Musculoskeletal: No gross MSK deformities. Ambulates  Skin: No rashes appreciated.   Psych: Alert and oriented with appropriate mood and affect.  Neuro:   Grossly normal. CN II-XII intact grossly (visual fields not tested), strength and sensation intact throughout, no deficit in ROM, targeting    Data Review:    Independent Interpretation of Test(s): MRI brain 3/8/24 was personally reviewed as detailed above      Assessment:  Recovering well from acute radiation therapy related toxicities.  Improvement on MRI  ECOG: (1) Restricted in physically strenuous activity, ambulatory and able to do work of light nature    Plan:  Follow up in 4 months  Msg Dr. Schuler re possible followup  Follow " up with Ophtho/visual field testing next week  Follow up with Dr. Mabry 4/2024 for adjuvant Temodar  MRI brain in 1-2 months (per Dr. Mabry schedule)  He was given our contact information, and he was told that he could call our clinic at anytime if he has any questions or concerns.  Follow up with other providers as directed

## 2024-04-01 DIAGNOSIS — D49.6 BRAIN TUMOR: ICD-10-CM

## 2024-04-02 ENCOUNTER — PATIENT MESSAGE (OUTPATIENT)
Dept: RADIATION ONCOLOGY | Facility: CLINIC | Age: 44
End: 2024-04-02
Payer: COMMERCIAL

## 2024-04-02 RX ORDER — MEMANTINE HYDROCHLORIDE 10 MG/1
10 TABLET ORAL 2 TIMES DAILY
Qty: 60 TABLET | Refills: 4 | Status: SHIPPED | OUTPATIENT
Start: 2024-04-02 | End: 2024-08-30

## 2024-04-05 DIAGNOSIS — C71.9 OLIGODENDROGLIOMA DETERMINED BY BIOPSY OF BRAIN: ICD-10-CM

## 2024-04-10 NOTE — PROGRESS NOTES
"Date:  4/11/2024    ?  Referring Provider:   Derke Farris MD    Copies of Letters to the Following:   Derek Farris MD    Chief Complaint:  I saw Carlos Jacobson at the [facility] for neuro-ophthalmic evaluation.   He is a 43 y.o. 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    History:     HPI    Referred: Dr. Farris    44 y/o male present to clinic with parents for Neuro-ophthalmic evaluation   for history of Oligodendroglioma. S/p brain biopsy. Pt states that after   brain surgery he noticed visual improvement. However, near vision is   blurry after prolong near work. Last noted MRI - 2/2024. He states   significant improvement of tinnitus after s/x. Last eye exam was 2 years   ago. History of Lasik, bilateral x 11 years ago. He has dry eyes, not   treating at this time. He denies headaches, migraines, eye allergies, and   diplopia today.     Eyemed  No gtts  Last edited by Solitario Clifton on 4/11/2024  8:27 AM.          3/12/2024 neuro-onc ([facility]):  "5/2023: presented to urgent care for evaluation of tunnel vision and left foot numbness. CT head showed a left frontal lesion. MRI brain confirmed diffuse, non-enhancing tumor within the temporal and parietal lobes involving the cingulate gyrus and crossing the midline via diffuse infiltration of the posterior corpus callosum  6/13/2023: biopsy of lesion (Michele Ganga) with pathology consistent with oligodendroglioma, WHO grade 2  7/27/2023: resection of lesion with Dr. Segura consistent with oligodendroglioma, IDH-mutant and 1p/19q-codeleted, residual CNS WHO Grade 2  9/6/2023-10/17/2023: chemoradiation (Sena Shelton)  12/2023: C1 TMZ @150mg/m2  1/2024: C2 TMZ @200mg/m2  2/2024: C3 TMZ @200mg/m2     Edison is overall feeling well. He presents today with his parents on the phone. He is continuing to work. No " "new symptoms. Tolerated C3 with improvement in fatigue."    10/5/2032 [place]:  "Tumor removed x 2 months ago. Notice since no right sided peripheral   vision.  No eye pain.  Review HVF"    5/30/2023 [facility]:  "Mr. Jacobson is a 42 year old male who presents today for evaluation of recent brain imaging. Over the last 6 months, he reports pressure in his head and tinnitus at times. He has associated these symptoms with anxiety which he has struggled with over the last few years. He reports two episodes of left foot numbness and tunnel vision in recent weeks which lead him to seek evaluation at the urgent care. A CT was performed there which showed a brain lesion and PCP ordered MRI brain and CT C/A/P.      He feels that he has trouble with coordination when walking down stairs. Otherwise denies other neurologic symptom.    42 year old male with large nonenhancing lesion of the left parietal lobe which extends across the corpus callosum to the right side and into the left temporal lobe. We discussed imaging findings at length as well as treatment options. From a surgical standpoint, recommend biopsy for definitive tissue diagnosis and to guide further treatment through oncology, radiation oncology and potentially a larger surgery for resection of the lesion. We discussed left parietal approach for biopsy of lesion as well as risks/benefits of this procedure.  Plan to proceed. "  ?  Current Outpatient Medications   Medication Sig Dispense Refill    ascorbic acid, vitamin C, (VITAMIN C) 1000 MG tablet Take 1,000 mg by mouth once daily.      EScitalopram oxalate (LEXAPRO) 10 MG tablet Take 1.5 tablets (15 mg total) by mouth once daily. (Patient taking differently: Take 5 mg by mouth once daily.) 135 tablet 4    Lactobacillus acidophilus (PROBIOTIC ORAL) Take 1 tablet by mouth once daily.      levETIRAcetam (KEPPRA) 750 MG Tab Take 1 tablet (750 mg total) by mouth 2 (two) times daily. 180 tablet 3    levocetirizine (XYZAL) 5 MG " tablet Take 5 mg by mouth every evening.      memantine (NAMENDA) 10 MG Tab TAKE 1 TABLET (10 MG TOTAL) BY MOUTH 2 (TWO) TIMES DAILY. 60 tablet 4    multivitamin (THERAGRAN) per tablet Take 1 tablet by mouth once daily.      ondansetron (ZOFRAN) 8 MG tablet Take 8mg (1 tab) 30-45 minutes prior to chemotherapy dose and every 8 hours as needed (Patient not taking: Reported on 10/20/2023) 30 tablet 3     No current facility-administered medications for this visit.     Review of patient's allergies indicates:  No Known Allergies  Past Medical History:   Diagnosis Date    Allergy     Anxiety     Brain tumor     Cancer     Clostridium difficile colitis 04/28/2014    GERD (gastroesophageal reflux disease)     Renal lesion      Past Surgical History:   Procedure Laterality Date    BILATERAL INGUINAL HERNIA REPAIR Bilateral 1987    COLONOSCOPY  2014    CRANIOTOMY Left 7/27/2023    Procedure: CRANIOTOMY;  Surgeon: Michele Schuler MD;  Location: [address];  Service: Neurosurgery;  Laterality: Left;  LEFT PARIETAL CRANIOTOMY WITH RESECTION OF BRAIN MASS WITH DR. MIMS    STEREOTACTIC BIOPSY OF BRAIN Left 6/13/2023    Procedure: BIOPSY, BRAIN, STEREOTACTIC - LEFT PARIETAL BRAIN BIOPSY;  Surgeon: Michele Schuler MD;  Location: [facility];  Service: Neurosurgery;  Laterality: Left;     Family History   Problem Relation Age of Onset    Hyperlipidemia Mother     Hyperlipidemia Father     No Known Problems Sister     Hyperlipidemia Brother     Hyperlipidemia Maternal Uncle     Diabetes Maternal Grandmother     Lung cancer Maternal Grandmother      Social History     Socioeconomic History    Marital status: Single   Tobacco Use    Smoking status: Some Days     Types: Cigars    Smokeless tobacco: Never    Tobacco comments:     Cigar once a year   Substance and Sexual Activity    Alcohol use: No     Comment: occasionally    Drug use: No    Sexual activity: Never     Social Determinants of Health     Financial Resource Strain:  Low Risk  (2/8/2024)    Overall Financial Resource Strain (CARDIA)     Difficulty of Paying Living Expenses: Not hard at all   Food Insecurity: No Food Insecurity (2/8/2024)    Hunger Vital Sign     Worried About Running Out of Food in the Last Year: Never true     Ran Out of Food in the Last Year: Never true   Transportation Needs: No Transportation Needs (2/8/2024)    PRAPARE - Transportation     Lack of Transportation (Medical): No     Lack of Transportation (Non-Medical): No   Physical Activity: Sufficiently Active (2/8/2024)    Exercise Vital Sign     Days of Exercise per Week: 3 days     Minutes of Exercise per Session: 60 min   Stress: No Stress Concern Present (2/8/2024)    [employer] - Occupational Stress Questionnaire     Feeling of Stress : Not at all   Social Connections: Moderately Integrated (2/8/2024)    Social Connection and Isolation Panel [NHANES]     Frequency of Communication with Friends and Family: More than three times a week     Frequency of Social Gatherings with Friends and Family: Twice a week     Attends [employer] Services: More than 4 times per year     Active Member of Clubs or Organizations: Yes     Attends Club or Organization Meetings: More than 4 times per year     Marital Status: Never    Housing Stability: Low Risk  (2/8/2024)    Housing Stability Vital Sign     Unable to Pay for Housing in the Last Year: No     Number of Places Lived in the Last Year: 2     Unstable Housing in the Last Year: No       Examination:  He was well-appearing. He was alert and oriented. Attention span and concentration were normal. Speech, language, memory, and general knowledge were intact.      His distance visual acuity without correction was 20/50+2 (PH 20/30) in the right eye and 20/40-1 (PH 20/25-2) in the left eye.  His near visual acuity without correction was J1 in the right eye and J1 in the left eye.      He perceived 1/8 OD and 1/8 OS Ishihara color  plates correctly. Pupils were brisk to light without an afferent defect. Ocular ductions were full. There was no nystagmus. Saccades and pursuits were normal. Lids were asymmetric, 1 mm left eye ptosis.     Sensorimotor Examination    Cross cover at distance  Primary flick LHT  Left ortho  Right 3 LHT  Down 6 LHT  Up 1 LHT  Right tilt 3 LHT  Left tilt 3 LHT    Optic discs appeared normal without swelling or pallor. Pupillary dilation was not necessary for visualization of the optic disc today.     Laboratories Reviewed:     N/a  ?  Neuroimaging Reviewed:     3/8/2024 MRI brain w/wo contrast  Brain: Left parietal craniotomy changes and underlying resection cavity.  Overall no substantial change in size and configuration of the resection cavity.  Marginating nonenhancing T2/FLAIR signal intensity continues to demonstrate decreased size and conspicuity notably involving the left paramedian parietal lobe and cingulate gyrus with similar appearance of the mesial temporal lobe and corpus callosum which also demonstrates some internal cystic changes involving the right aspect of the splenium.  This is presumably representative of combined residual tumor and edema.  No significant mass effect or midline shift.  No new parenchymal signal abnormality.  No new intracranial enhancement.     No acute infarct or hemorrhage.  Trace residual extra-axial fluid deep to the craniotomy site which is unchanged.  No associated mass effect.        Ventricles: The ventricles, sulci, and cisterns are stable.  No hydrocephalus.  Vasculature: The vascular flow voids at the base of the brain are within normal limits.  Calvarium: Left parietal craniotomy changes.  No acute process.  Sinuses: The paranasal sinuses are adequately aerated.  Orbits: The orbits and globes are unremarkable.  Mastoids: The mastoid air cells are adequately aerated.  Extracranial soft tissues: The visualized extracranial soft tissues are unremarkable.     Impression:      1. No adverse interval change or evidence of disease progression.  No acute process.  Serial improvement with decreased nonenhancing T2/FLAIR signal abnormality marginating the resection cavity within the left parietal lobe as described above.      5/26/2023 MRI brain w/wo contrast  Brain: Large, ill-defined infiltrative appearing intraparenchymal tumor centered within the left parietal lobe and mesial temporal lobe as well as diffuse infiltration of the posterior cingulate gyrus and corpus callosum.  The splenium of the corpus callosum is diffusely involved with extension across midline.  There is diffuse gyral swelling which is primarily T2/FLAIR hyperintense and T1 hypointense.  The margins of signal abnormality presumably represent mixed vasogenic edema are anterior, difficult to delineate.  There is suspicion of a so-called T2/FLAIR mismatch centrally which is nonspecific but has been associated with IDH mutant astrocytomas.     No distant/satellite parenchymal tumor size identified.  No abnormal dural or leptomeningeal enhancement.     There is partial effacement of the lateral ventricles posteriorly, left greater than right as well as the left temporal horn without evidence of acute hydrocephalus.  Minimal left-to-right midline shift is noted on the order of 3-4 mm.  No acute infarct, hemorrhage, or space-occupying extra-axial fluid collection.  No transtentorial herniation.        Ventricles: The ventricles, sulci and cisterns are within normal limits.  Vasculature: The vascular flow voids at the base of the brain are within normal limits.  Calvarium: The visualized osseous structures are unremarkable.  Sinuses: The paranasal sinuses are adequately aerated.  Orbits: The orbits and globes are unremarkable.  Mastoids: The mastoid air cells are adequately aerated.  Extracranial soft tissues: The visualized extracranial soft tissues are unremarkable.     Impression:     1. Diffuse, infiltrative tumor involving  the left posterior cerebral hemisphere center within the temporal and parietal lobes involving the cingulate gyrus and crossing the midline via diffuse infiltration of the posterior corpus callosum.  No appreciable enhancing components or diffusion restriction.  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  Imaging features would be atypical for primary CNS lymphoma.  2. Minimal left-to-right midline shift and partial effacement of the lateral ventricles posteriorly.  No acute process at this time.  Neuro surgical consultation is recommended.     ?  Ocular Imaging, Photos, Records Reviewed:     OCT RNFL Today 4/11/2024:   Right Eye - Average RNFL 91 all segments normal   Left Eye - Average RNFL 87 all segments normal     Normal macular architecture OU    Visual Field Test 24-2 OU Today 4/11/2024: Right Eye - fixation losses 7/12, false positives 3%, false negatives 18%, MD -6.59dB, Impression OD: incomplete temporal depression with central sparing. Left Eye - fixation losses 0/12, false positives 0%, false negatives 0%, MD -8.00dB, Impression OS: nasal depression with some central sparing.  ?  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 They report tunnel vision prior to diagnosis of oligodendroglioma then right sided vision loss following tumor resection which has been stable. Denies any diplopia. Surveillance MRIs have shown no recurrence of tumor. 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 OCT with normal and symmetric RNFL thicknesses.  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 I will monitor for any interval changes over time which would prompt concern for tumor recurrence.  ?  Plan:  1. Follow up with neurosurgery, radiation oncology and neuro-oncology as planned  2. Follow up with optometry/ophthalmology for yearly routine eye exams and refraction needs, will arrange with Arlette Leos    Follow-up:  I will see him in follow-up in 6 months or sooner with any change.  ?OCT and HVF  ?  Visit Checklist (as applicable):  1. Status of new and prior symptoms discussed? yes  2. Neuroimaging reviewed/ ordered as appropriate? yes  3. Ocular imaging and photos reviewed/ ordered as appropriate? yes  4. Plan for work-up and treatment discussed with patient? yes  5. Potential medication side-effects and monitoring plan discussed? N/a  6. Review of outside medical records was performed and pertinent details are summarized in the HPI above? N/a    Time spent on this encounter: 45 minutes. This includes face to face time and non-face to face time preparing to see the patient (eg, review of tests), obtaining and/or reviewing separately obtained history, documenting clinical information in the electronic or other health record, independently interpreting results and communicating results to the patient/family/caregiver, or care coordinator.    Visit today included increased complexity associated with the evaluation and the longitudinal management of the patient due to the serious and complex problem of oligodendroglioma s/p resection and right homonymous hemianopia requiring episodic surveillance of formal visual neely.        MARIBETH Gonzales  Neuro-Ophthalmology Consultant

## 2024-04-11 ENCOUNTER — OFFICE VISIT (OUTPATIENT)
Dept: OPHTHALMOLOGY | Facility: CLINIC | Age: 44
End: 2024-04-11
Payer: COMMERCIAL

## 2024-04-11 ENCOUNTER — TELEPHONE (OUTPATIENT)
Dept: OPTOMETRY | Facility: CLINIC | Age: 44
End: 2024-04-11
Payer: COMMERCIAL

## 2024-04-11 ENCOUNTER — CLINICAL SUPPORT (OUTPATIENT)
Dept: OPHTHALMOLOGY | Facility: CLINIC | Age: 44
End: 2024-04-11
Payer: COMMERCIAL

## 2024-04-11 DIAGNOSIS — H53.461 HEMIANOPIA, HOMONYMOUS, RIGHT: Primary | ICD-10-CM

## 2024-04-11 DIAGNOSIS — C71.9 OLIGODENDROGLIOMA DETERMINED BY BIOPSY OF BRAIN: ICD-10-CM

## 2024-04-11 DIAGNOSIS — H53.15 VISUAL DISTORTIONS OF SHAPE AND SIZE: ICD-10-CM

## 2024-04-11 DIAGNOSIS — H49.12 LEFT-SIDED FOURTH CRANIAL NERVE PALSY: ICD-10-CM

## 2024-04-11 PROCEDURE — 92133 CPTRZD OPH DX IMG PST SGM ON: CPT | Mod: S$GLB,,, | Performed by: STUDENT IN AN ORGANIZED HEALTH CARE EDUCATION/TRAINING PROGRAM

## 2024-04-11 PROCEDURE — 92083 EXTENDED VISUAL FIELD XM: CPT | Mod: S$GLB,,, | Performed by: STUDENT IN AN ORGANIZED HEALTH CARE EDUCATION/TRAINING PROGRAM

## 2024-04-11 PROCEDURE — G2211 COMPLEX E/M VISIT ADD ON: HCPCS | Mod: S$GLB,,, | Performed by: STUDENT IN AN ORGANIZED HEALTH CARE EDUCATION/TRAINING PROGRAM

## 2024-04-11 PROCEDURE — 99215 OFFICE O/P EST HI 40 MIN: CPT | Mod: S$GLB,,, | Performed by: STUDENT IN AN ORGANIZED HEALTH CARE EDUCATION/TRAINING PROGRAM

## 2024-04-11 PROCEDURE — 1160F RVW MEDS BY RX/DR IN RCRD: CPT | Mod: CPTII,S$GLB,, | Performed by: STUDENT IN AN ORGANIZED HEALTH CARE EDUCATION/TRAINING PROGRAM

## 2024-04-11 PROCEDURE — 1159F MED LIST DOCD IN RCRD: CPT | Mod: CPTII,S$GLB,, | Performed by: STUDENT IN AN ORGANIZED HEALTH CARE EDUCATION/TRAINING PROGRAM

## 2024-04-11 PROCEDURE — 99999 PR PBB SHADOW E&M-EST. PATIENT-LVL II: CPT | Mod: PBBFAC,,, | Performed by: STUDENT IN AN ORGANIZED HEALTH CARE EDUCATION/TRAINING PROGRAM

## 2024-04-11 NOTE — TELEPHONE ENCOUNTER
----- Message from Solitario Clifton sent at 4/11/2024  8:50 AM CDT -----  Please schedule NP- routine eye exam @ [facility]. Thanks.

## 2024-04-11 NOTE — LETTER
[facility] - [address] [place] [address]  [address]  [facility] [address]  Phone: [phone]  Fax: [phone]   April 11, 2024    Candice Mabry MD  [address]  [facility] [address]    Patient: Carlos Jacobson   MR Number: [id number]   YOB: 1980   Date of Visit: 4/11/2024       Dear Dr. Mabry :    Thank you for referring Carlos Jacobson to me for evaluation. 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 They report tunnel vision prior to diagnosis of oligodendroglioma then right sided vision loss following tumor resection which has been stable. Denies any diplopia. Surveillance MRIs have shown no recurrence of tumor. 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 OCT with normal and symmetric RNFL thicknesses. 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 I will monitor for any interval changes over time which would prompt concern for tumor recurrence.     Plan:  1. Follow up with neurosurgery, radiation oncology and neuro-oncology as planned  2. Follow up with optometry/ophthalmology for yearly routine eye exams and refraction needs, will arrange with Arlette Leos    Follow-up:  I will see him in follow-up in 6 months or sooner with any change.    If you have questions, please do not hesitate to call me. I look forward to following Mr. Carlos Jacobson along with you.    Sincerely,        Delia Carter MD       CC  Michele Schuler MD

## 2024-04-11 NOTE — PROGRESS NOTES
Visual field test done.  Patient stated no latex allergies used coverlet      Fixation  loss poor od  good os     Some eye move ment in od

## 2024-04-12 ENCOUNTER — LAB VISIT (OUTPATIENT)
Dept: LAB | Facility: HOSPITAL | Age: 44
End: 2024-04-12
Attending: PSYCHIATRY & NEUROLOGY
Payer: COMMERCIAL

## 2024-04-12 ENCOUNTER — OFFICE VISIT (OUTPATIENT)
Dept: NEUROSURGERY | Facility: CLINIC | Age: 44
End: 2024-04-12
Payer: COMMERCIAL

## 2024-04-12 VITALS
HEART RATE: 61 BPM | SYSTOLIC BLOOD PRESSURE: 101 MMHG | DIASTOLIC BLOOD PRESSURE: 61 MMHG | BODY MASS INDEX: 22.4 KG/M2 | WEIGHT: 169.75 LBS

## 2024-04-12 DIAGNOSIS — R56.9 SEIZURE: ICD-10-CM

## 2024-04-12 DIAGNOSIS — C71.9 OLIGODENDROGLIOMA DETERMINED BY BIOPSY OF BRAIN: ICD-10-CM

## 2024-04-12 DIAGNOSIS — C71.9 OLIGODENDROGLIOMA DETERMINED BY BIOPSY OF BRAIN: Primary | ICD-10-CM

## 2024-04-12 DIAGNOSIS — H53.9 VISION CHANGES: ICD-10-CM

## 2024-04-12 LAB
ALBUMIN SERPL BCP-MCNC: 4.2 G/DL (ref 3.5–5.2)
ALP SERPL-CCNC: 65 U/L (ref 55–135)
ALT SERPL W/O P-5'-P-CCNC: 13 U/L (ref 10–44)
ANION GAP SERPL CALC-SCNC: 9 MMOL/L (ref 8–16)
AST SERPL-CCNC: 14 U/L (ref 10–40)
BASOPHILS # BLD AUTO: 0.01 K/UL (ref 0–0.2)
BASOPHILS NFR BLD: 0.3 % (ref 0–1.9)
BILIRUB SERPL-MCNC: 0.5 MG/DL (ref 0.1–1)
BUN SERPL-MCNC: 16 MG/DL (ref 6–20)
CALCIUM SERPL-MCNC: 9.7 MG/DL (ref 8.7–10.5)
CHLORIDE SERPL-SCNC: 109 MMOL/L (ref 95–110)
CO2 SERPL-SCNC: 26 MMOL/L (ref 23–29)
CREAT SERPL-MCNC: 0.9 MG/DL (ref 0.5–1.4)
DIFFERENTIAL METHOD BLD: ABNORMAL
EOSINOPHIL # BLD AUTO: 0 K/UL (ref 0–0.5)
EOSINOPHIL NFR BLD: 0.9 % (ref 0–8)
ERYTHROCYTE [DISTWIDTH] IN BLOOD BY AUTOMATED COUNT: 12.5 % (ref 11.5–14.5)
EST. GFR  (NO RACE VARIABLE): >60 ML/MIN/1.73 M^2
GLUCOSE SERPL-MCNC: 93 MG/DL (ref 70–110)
HCT VFR BLD AUTO: 41.2 % (ref 40–54)
HGB BLD-MCNC: 14.1 G/DL (ref 14–18)
IMM GRANULOCYTES # BLD AUTO: 0 K/UL (ref 0–0.04)
IMM GRANULOCYTES NFR BLD AUTO: 0 % (ref 0–0.5)
LYMPHOCYTES # BLD AUTO: 1 K/UL (ref 1–4.8)
LYMPHOCYTES NFR BLD: 30.9 % (ref 18–48)
MCH RBC QN AUTO: 32.5 PG (ref 27–31)
MCHC RBC AUTO-ENTMCNC: 34.2 G/DL (ref 32–36)
MCV RBC AUTO: 95 FL (ref 82–98)
MONOCYTES # BLD AUTO: 0.4 K/UL (ref 0.3–1)
MONOCYTES NFR BLD: 12.2 % (ref 4–15)
NEUTROPHILS # BLD AUTO: 1.8 K/UL (ref 1.8–7.7)
NEUTROPHILS NFR BLD: 55.7 % (ref 38–73)
NRBC BLD-RTO: 0 /100 WBC
PLATELET # BLD AUTO: 134 K/UL (ref 150–450)
PMV BLD AUTO: 9.4 FL (ref 9.2–12.9)
POTASSIUM SERPL-SCNC: 4.4 MMOL/L (ref 3.5–5.1)
PROT SERPL-MCNC: 7.2 G/DL (ref 6–8.4)
RBC # BLD AUTO: 4.34 M/UL (ref 4.6–6.2)
SODIUM SERPL-SCNC: 144 MMOL/L (ref 136–145)
WBC # BLD AUTO: 3.27 K/UL (ref 3.9–12.7)

## 2024-04-12 PROCEDURE — 1159F MED LIST DOCD IN RCRD: CPT | Mod: CPTII,S$GLB,, | Performed by: PSYCHIATRY & NEUROLOGY

## 2024-04-12 PROCEDURE — 3078F DIAST BP <80 MM HG: CPT | Mod: CPTII,S$GLB,, | Performed by: PSYCHIATRY & NEUROLOGY

## 2024-04-12 PROCEDURE — 80053 COMPREHEN METABOLIC PANEL: CPT | Performed by: PSYCHIATRY & NEUROLOGY

## 2024-04-12 PROCEDURE — 85025 COMPLETE CBC W/AUTO DIFF WBC: CPT | Performed by: PSYCHIATRY & NEUROLOGY

## 2024-04-12 PROCEDURE — 36415 COLL VENOUS BLD VENIPUNCTURE: CPT | Performed by: PSYCHIATRY & NEUROLOGY

## 2024-04-12 PROCEDURE — 99215 OFFICE O/P EST HI 40 MIN: CPT | Mod: S$GLB,,, | Performed by: PSYCHIATRY & NEUROLOGY

## 2024-04-12 PROCEDURE — 99999 PR PBB SHADOW E&M-EST. PATIENT-LVL III: CPT | Mod: PBBFAC,,, | Performed by: PSYCHIATRY & NEUROLOGY

## 2024-04-12 PROCEDURE — 3008F BODY MASS INDEX DOCD: CPT | Mod: CPTII,S$GLB,, | Performed by: PSYCHIATRY & NEUROLOGY

## 2024-04-12 PROCEDURE — 3074F SYST BP LT 130 MM HG: CPT | Mod: CPTII,S$GLB,, | Performed by: PSYCHIATRY & NEUROLOGY

## 2024-04-12 RX ORDER — TEMOZOLOMIDE 100 MG/1
CAPSULE ORAL
Qty: 20 CAPSULE | Refills: 0 | Status: SHIPPED | OUTPATIENT
Start: 2024-04-12 | End: 2024-05-15

## 2024-04-12 RX ORDER — TEMOZOLOMIDE 5 MG/1
CAPSULE ORAL
Qty: 5 CAPSULE | Refills: 0 | Status: SHIPPED | OUTPATIENT
Start: 2024-04-12 | End: 2024-05-15

## 2024-04-12 NOTE — PROGRESS NOTES
Subjective:       Patient ID: Carlos Jacobson is a 43 y.o. male.    Chief Complaint: olidendroglioma    HPI  5/2023: presented to urgent care for evaluation of tunnel vision and left foot numbness. CT head showed a left frontal lesion. MRI brain confirmed diffuse, non-enhancing tumor within the temporal and parietal lobes involving the cingulate gyrus and crossing the midline via diffuse infiltration of the posterior corpus callosum  6/13/2023: biopsy of lesion (Michele Schuler) with pathology consistent with oligodendroglioma, WHO grade 2  7/27/2023: resection of lesion with Dr. Segura consistent with oligodendroglioma, IDH-mutant and 1p/19q-codeleted, residual CNS WHO Grade 2  9/6/2023-10/17/2023: chemoradiation (eSna Shelton)  12/2023: C1 TMZ @150mg/m2  1/2024: C2 TMZ @200mg/m2  2/2024: C3 TMZ @200mg/m2  3/2024: C4 TMZ @200mg/m2    Edison is overall feeling well. He presents today with his parents on the phone. He is continuing to work. No new symptoms. Tolerated C4; had nausea/vomiting the first night as he did not take ondansetron.    Past Medical History:   Diagnosis Date    Allergy     Anxiety     Brain tumor     Cancer     Clostridium difficile colitis 04/28/2014    GERD (gastroesophageal reflux disease)     Renal lesion       Current Outpatient Medications   Medication Sig Dispense Refill    ascorbic acid, vitamin C, (VITAMIN C) 1000 MG tablet Take 1,000 mg by mouth once daily.      EScitalopram oxalate (LEXAPRO) 10 MG tablet Take 1.5 tablets (15 mg total) by mouth once daily. (Patient taking differently: Take 5 mg by mouth once daily.) 135 tablet 4    Lactobacillus acidophilus (PROBIOTIC ORAL) Take 1 tablet by mouth once daily.      levETIRAcetam (KEPPRA) 750 MG Tab Take 1 tablet (750 mg total) by mouth 2 (two) times daily. 180 tablet 3    levocetirizine (XYZAL) 5 MG tablet Take 5 mg by mouth every evening.      memantine (NAMENDA) 10 MG Tab TAKE 1 TABLET (10 MG TOTAL) BY MOUTH 2 (TWO) TIMES DAILY. 60  tablet 4    multivitamin (THERAGRAN) per tablet Take 1 tablet by mouth once daily.      ondansetron (ZOFRAN) 8 MG tablet Take 8mg (1 tab) 30-45 minutes prior to chemotherapy dose and every 8 hours as needed 30 tablet 3    temozolomide (TEMODAR) 100 MG capsule TAKE FOUR 100MG CAPSULES WITH  ONE 5MG CAPSULE (405MG TOTAL) BY MOUTH DAILY FOR 5 DAYS OF 28 DAY CYCLE 20 capsule 0    temozolomide (TEMODAR) 5 MG capsule TAKE ONE 5MG CAPSULE WITH FOUR  100MG CAPSULES (405MG TOTAL) BY  MOUTH DAILY FOR 5 DAYS OF 28 DAY CYCLE 5 capsule 0     No current facility-administered medications for this visit.      Past Surgical History:   Procedure Laterality Date    BILATERAL INGUINAL HERNIA REPAIR Bilateral 1987    COLONOSCOPY  2014    CRANIOTOMY Left 7/27/2023    Procedure: CRANIOTOMY;  Surgeon: Michele Schuler MD;  Location: [address];  Service: Neurosurgery;  Laterality: Left;  LEFT PARIETAL CRANIOTOMY WITH RESECTION OF BRAIN MASS WITH DR. MIMS    STEREOTACTIC BIOPSY OF BRAIN Left 6/13/2023    Procedure: BIOPSY, BRAIN, STEREOTACTIC - LEFT PARIETAL BRAIN BIOPSY;  Surgeon: Michele Schuler MD;  Location: [facility];  Service: Neurosurgery;  Laterality: Left;      Family History   Problem Relation Age of Onset    Hyperlipidemia Mother     Hyperlipidemia Father     No Known Problems Sister     Hyperlipidemia Brother     Hyperlipidemia Maternal Uncle     Diabetes Maternal Grandmother     Lung cancer Maternal Grandmother       Social History     Tobacco Use    Smoking status: Some Days     Types: Cigars    Smokeless tobacco: Never    Tobacco comments:     Cigar once a year   Substance Use Topics    Alcohol use: No     Comment: occasionally    Drug use: No      Review of Systems   Constitutional:  Negative for fatigue, fever and unexpected weight change.   HENT:  Negative for dental problem and drooling.    Eyes:  Positive for photophobia and visual disturbance.   Respiratory:  Negative for cough and shortness of breath.     Cardiovascular:  Negative for chest pain and leg swelling.   Gastrointestinal:  Negative for abdominal pain and nausea.   Genitourinary:  Negative for difficulty urinating and dysuria.   Musculoskeletal:  Negative for arthralgias and back pain.   Neurological:  Positive for headaches. Negative for dizziness, seizures, weakness and numbness.   Psychiatric/Behavioral:  Negative for agitation and confusion.      KPS: 90      Objective:      Vitals:    04/12/24 1113   BP: 101/61   Pulse: 61     NEUROLOGICAL EXAMINATION:     MENTAL STATUS   Attention: normal. Concentration: normal.   Speech: speech is normal   Level of consciousness: alert    CRANIAL NERVES     CN II   Right visual field deficit: Right homonymous hemainopia.    CN III, IV, VI   Extraocular motions are normal.     CN V   Facial sensation intact.     CN VII   Facial expression full, symmetric.     CN VIII   CN VIII normal.     CN IX, X   CN IX normal.   CN X normal.     CN XI   CN XI normal.     CN XII   CN XII normal.     MOTOR EXAM     Strength   Strength 5/5 throughout.     SENSORY EXAM   Light touch normal.     GAIT AND COORDINATION     Gait  Gait: normal     Coordination   Finger to nose coordination: normal    MRI brain from 3/8/2024 was personally visualized and compared to prior. Per my read, there is no evidence of tumor progression.      Assessment:       Problem List Items Addressed This Visit          Neuro    Seizure       Oncology    Oligodendroglioma determined by biopsy of brain - Primary     Other Visit Diagnoses       Vision changes                    Plan:       Carlos Jacobson is a 43 y.o. male with an oligodendroglioma, WHO grade 2 s/p subtotal resection, chemoradiation, and ongoing adjuvant TMZ presenting for follow up. Tumor diagnosis was heralded by left foot numbness and tunnel vision. Current symptoms include visual changes.    Oligodendroglioma  Undergoing adjuvant TMZ. Most recent MRI brain is stable. Labs are adequate for  continuation of adjuvant TMZ C5 @200mg/m2. F/u in 4 weeks with CBC/CMP in anticipation of C6 TMZ. Next MRI brain due 6/2024.    Seizure  Had episodic aphasia for 30 seconds at a time with quick return to baseline consistent with focal seizure. Resolved once AED started. Continue levetiracetam 750mg bid.    Vision changes/decreased peripheral vision  Follows with neuro-ophthalmology                  Candice Mabry MD  Department of Neurology  Neuro-oncology, Movement Disorders

## 2024-04-16 ENCOUNTER — PATIENT MESSAGE (OUTPATIENT)
Dept: NEUROSURGERY | Facility: CLINIC | Age: 44
End: 2024-04-16
Payer: COMMERCIAL

## 2024-05-07 DIAGNOSIS — C71.9 OLIGODENDROGLIOMA DETERMINED BY BIOPSY OF BRAIN: ICD-10-CM

## 2024-05-14 ENCOUNTER — OFFICE VISIT (OUTPATIENT)
Dept: NEUROSURGERY | Facility: CLINIC | Age: 44
End: 2024-05-14
Payer: COMMERCIAL

## 2024-05-14 ENCOUNTER — LAB VISIT (OUTPATIENT)
Dept: LAB | Facility: HOSPITAL | Age: 44
End: 2024-05-14
Attending: PSYCHIATRY & NEUROLOGY
Payer: COMMERCIAL

## 2024-05-14 VITALS
SYSTOLIC BLOOD PRESSURE: 133 MMHG | DIASTOLIC BLOOD PRESSURE: 71 MMHG | TEMPERATURE: 98 F | HEART RATE: 76 BPM | BODY MASS INDEX: 22.05 KG/M2 | OXYGEN SATURATION: 98 % | WEIGHT: 167.13 LBS

## 2024-05-14 DIAGNOSIS — C71.9 OLIGODENDROGLIOMA DETERMINED BY BIOPSY OF BRAIN: ICD-10-CM

## 2024-05-14 DIAGNOSIS — C71.9 OLIGODENDROGLIOMA DETERMINED BY BIOPSY OF BRAIN: Primary | ICD-10-CM

## 2024-05-14 DIAGNOSIS — R56.9 SEIZURE: ICD-10-CM

## 2024-05-14 DIAGNOSIS — H53.459 DECREASED PERIPHERAL VISION, UNSPECIFIED LATERALITY: ICD-10-CM

## 2024-05-14 DIAGNOSIS — H53.9 VISION CHANGES: ICD-10-CM

## 2024-05-14 LAB
ALBUMIN SERPL BCP-MCNC: 4.3 G/DL (ref 3.5–5.2)
ALP SERPL-CCNC: 64 U/L (ref 55–135)
ALT SERPL W/O P-5'-P-CCNC: 12 U/L (ref 10–44)
ANION GAP SERPL CALC-SCNC: 10 MMOL/L (ref 8–16)
AST SERPL-CCNC: 13 U/L (ref 10–40)
BASOPHILS # BLD AUTO: 0.02 K/UL (ref 0–0.2)
BASOPHILS NFR BLD: 0.5 % (ref 0–1.9)
BILIRUB SERPL-MCNC: 0.6 MG/DL (ref 0.1–1)
BUN SERPL-MCNC: 10 MG/DL (ref 6–20)
CALCIUM SERPL-MCNC: 10.1 MG/DL (ref 8.7–10.5)
CHLORIDE SERPL-SCNC: 108 MMOL/L (ref 95–110)
CO2 SERPL-SCNC: 26 MMOL/L (ref 23–29)
CREAT SERPL-MCNC: 0.8 MG/DL (ref 0.5–1.4)
DIFFERENTIAL METHOD BLD: ABNORMAL
EOSINOPHIL # BLD AUTO: 0 K/UL (ref 0–0.5)
EOSINOPHIL NFR BLD: 0.5 % (ref 0–8)
ERYTHROCYTE [DISTWIDTH] IN BLOOD BY AUTOMATED COUNT: 12.1 % (ref 11.5–14.5)
EST. GFR  (NO RACE VARIABLE): >60 ML/MIN/1.73 M^2
GLUCOSE SERPL-MCNC: 96 MG/DL (ref 70–110)
HCT VFR BLD AUTO: 42.4 % (ref 40–54)
HGB BLD-MCNC: 14.4 G/DL (ref 14–18)
IMM GRANULOCYTES # BLD AUTO: 0.01 K/UL (ref 0–0.04)
IMM GRANULOCYTES NFR BLD AUTO: 0.3 % (ref 0–0.5)
LYMPHOCYTES # BLD AUTO: 1 K/UL (ref 1–4.8)
LYMPHOCYTES NFR BLD: 26.9 % (ref 18–48)
MCH RBC QN AUTO: 32.8 PG (ref 27–31)
MCHC RBC AUTO-ENTMCNC: 34 G/DL (ref 32–36)
MCV RBC AUTO: 97 FL (ref 82–98)
MONOCYTES # BLD AUTO: 0.4 K/UL (ref 0.3–1)
MONOCYTES NFR BLD: 9.3 % (ref 4–15)
NEUTROPHILS # BLD AUTO: 2.4 K/UL (ref 1.8–7.7)
NEUTROPHILS NFR BLD: 62.5 % (ref 38–73)
NRBC BLD-RTO: 0 /100 WBC
PLATELET # BLD AUTO: 128 K/UL (ref 150–450)
PMV BLD AUTO: 9.8 FL (ref 9.2–12.9)
POTASSIUM SERPL-SCNC: 4.9 MMOL/L (ref 3.5–5.1)
PROT SERPL-MCNC: 7.3 G/DL (ref 6–8.4)
RBC # BLD AUTO: 4.39 M/UL (ref 4.6–6.2)
SODIUM SERPL-SCNC: 144 MMOL/L (ref 136–145)
WBC # BLD AUTO: 3.76 K/UL (ref 3.9–12.7)

## 2024-05-14 PROCEDURE — 99215 OFFICE O/P EST HI 40 MIN: CPT | Mod: S$GLB,,, | Performed by: PSYCHIATRY & NEUROLOGY

## 2024-05-14 PROCEDURE — 80053 COMPREHEN METABOLIC PANEL: CPT | Performed by: PSYCHIATRY & NEUROLOGY

## 2024-05-14 PROCEDURE — 3008F BODY MASS INDEX DOCD: CPT | Mod: CPTII,S$GLB,, | Performed by: PSYCHIATRY & NEUROLOGY

## 2024-05-14 PROCEDURE — 3078F DIAST BP <80 MM HG: CPT | Mod: CPTII,S$GLB,, | Performed by: PSYCHIATRY & NEUROLOGY

## 2024-05-14 PROCEDURE — 99999 PR PBB SHADOW E&M-EST. PATIENT-LVL III: CPT | Mod: PBBFAC,,, | Performed by: PSYCHIATRY & NEUROLOGY

## 2024-05-14 PROCEDURE — 1159F MED LIST DOCD IN RCRD: CPT | Mod: CPTII,S$GLB,, | Performed by: PSYCHIATRY & NEUROLOGY

## 2024-05-14 PROCEDURE — 85025 COMPLETE CBC W/AUTO DIFF WBC: CPT | Performed by: PSYCHIATRY & NEUROLOGY

## 2024-05-14 PROCEDURE — 3075F SYST BP GE 130 - 139MM HG: CPT | Mod: CPTII,S$GLB,, | Performed by: PSYCHIATRY & NEUROLOGY

## 2024-05-14 PROCEDURE — 36415 COLL VENOUS BLD VENIPUNCTURE: CPT | Performed by: PSYCHIATRY & NEUROLOGY

## 2024-05-14 NOTE — PROGRESS NOTES
Subjective:       Patient ID: Carlos Jacobson is a 43 y.o. male.    Chief Complaint: olidendroglioma    HPI  5/2023: presented to urgent care for evaluation of tunnel vision and left foot numbness. CT head showed a left frontal lesion. MRI brain confirmed diffuse, non-enhancing tumor within the temporal and parietal lobes involving the cingulate gyrus and crossing the midline via diffuse infiltration of the posterior corpus callosum  6/13/2023: biopsy of lesion (Michele Schuler) with pathology consistent with oligodendroglioma, WHO grade 2  7/27/2023: resection of lesion with Dr. 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 He tolerated C5 without issue. No new symptoms.    Past Medical History:   Diagnosis Date    Allergy     Anxiety     Brain tumor     Cancer     Clostridium difficile colitis 04/28/2014    GERD (gastroesophageal reflux disease)     Renal lesion       Current Outpatient Medications   Medication Sig Dispense Refill    ascorbic acid, vitamin C, (VITAMIN C) 1000 MG tablet Take 1,000 mg by mouth once daily.      Lactobacillus acidophilus (PROBIOTIC ORAL) Take 1 tablet by mouth once daily.      levETIRAcetam (KEPPRA) 750 MG Tab Take 1 tablet (750 mg total) by mouth 2 (two) times daily. 180 tablet 3    levocetirizine (XYZAL) 5 MG tablet Take 5 mg by mouth every evening.      memantine (NAMENDA) 10 MG Tab TAKE 1 TABLET (10 MG TOTAL) BY MOUTH 2 (TWO) TIMES DAILY. 60 tablet 4    multivitamin (THERAGRAN) per tablet Take 1 tablet by mouth once daily.      ondansetron (ZOFRAN) 8 MG tablet Take 8mg (1 tab) 30-45 minutes prior to chemotherapy dose and every 8 hours as needed 30 tablet 3    temozolomide (TEMODAR) 100 MG capsule TAKE FOUR 100MG  CAPSULES WITH  ONE 5MG CAPSULE (405MG TOTAL) BY MOUTH DAILY FOR 5 DAYS OF 28 DAY CYCLE 20 capsule 0    temozolomide (TEMODAR) 5 MG capsule TAKE ONE 5MG CAPSULE WITH FOUR  100MG CAPSULES (405MG TOTAL) BY  MOUTH DAILY FOR 5 DAYS OF 28 DAY CYCLE 5 capsule 0    EScitalopram oxalate (LEXAPRO) 10 MG tablet Take 1.5 tablets (15 mg total) by mouth once daily. (Patient not taking: Reported on 5/14/2024) 135 tablet 4     No current facility-administered medications for this visit.      Past Surgical History:   Procedure Laterality Date    BILATERAL INGUINAL HERNIA REPAIR Bilateral 1987    COLONOSCOPY  2014    CRANIOTOMY Left 7/27/2023    Procedure: CRANIOTOMY;  Surgeon: Michele Schuler MD;  Location: [facility] OR [address] [place] [address];  Service: Neurosurgery;  Laterality: Left;  LEFT PARIETAL CRANIOTOMY WITH RESECTION OF BRAIN MASS WITH DR. MIMS    STEREOTACTIC BIOPSY OF BRAIN Left 6/13/2023    Procedure: BIOPSY, BRAIN, STEREOTACTIC - LEFT PARIETAL BRAIN BIOPSY;  Surgeon: Michele Schuler MD;  Location: [facility];  Service: Neurosurgery;  Laterality: Left;      Family History   Problem Relation Name Age of Onset    Hyperlipidemia Mother      Hyperlipidemia Father      No Known Problems Sister      Hyperlipidemia Brother      Hyperlipidemia Maternal Uncle      Diabetes Maternal Grandmother      Lung cancer Maternal Grandmother        Social History     Tobacco Use    Smoking status: Some Days     Types: Cigars    Smokeless tobacco: Never    Tobacco comments:     Cigar once a year   Substance Use Topics    Alcohol use: No     Comment: occasionally    Drug use: No      Review of Systems   Constitutional:  Negative for fatigue, fever and unexpected weight change.   HENT:  Negative for dental problem and drooling.    Eyes:  Positive for photophobia and visual disturbance.   Respiratory:  Negative for cough and shortness of breath.    Cardiovascular:  Negative for chest pain and leg swelling.   Gastrointestinal:  Negative for abdominal pain and  nausea.   Genitourinary:  Negative for difficulty urinating and dysuria.   Musculoskeletal:  Negative for arthralgias and back pain.   Neurological:  Positive for headaches. Negative for dizziness, seizures, weakness and numbness.   Psychiatric/Behavioral:  Negative for agitation and confusion.      KPS: 90      Objective:      Vitals:    05/14/24 1231   BP: 133/71   Pulse: 76   Temp: 97.9 °F (36.6 °C)     NEUROLOGICAL EXAMINATION:     MENTAL STATUS   Attention: normal. Concentration: normal.   Speech: speech is normal   Level of consciousness: alert    CRANIAL NERVES     CN II   Right visual field deficit: Right homonymous hemainopia.    CN III, IV, VI   Extraocular motions are normal.     CN V   Facial sensation intact.     CN VII   Facial expression full, symmetric.     CN VIII   CN VIII normal.     CN IX, X   CN IX normal.   CN X normal.     CN XI   CN XI normal.     CN XII   CN XII normal.     MOTOR EXAM     Strength   Strength 5/5 throughout.     SENSORY EXAM   Light touch normal.     GAIT AND COORDINATION     Gait  Gait: normal     Coordination   Finger to nose coordination: normal    MRI brain from 3/8/2024 was personally visualized and compared to prior. Per my read, there is no evidence of tumor progression.      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 male with an oligodendroglioma, WHO grade 2 s/p subtotal resection, chemoradiation, and ongoing adjuvant TMZ presenting for follow up. Tumor diagnosis was heralded by left foot numbness and tunnel vision. Current symptoms include visual changes.    Oligodendroglioma  Undergoing adjuvant TMZ. Most recent MRI brain is stable. Labs  are adequate for continuation of adjuvant TMZ C6 @200mg/m2. F/u in 4 weeks with MRI brain and CBC/CMP in anticipation of C7 TMZ.     Seizure  Had episodic aphasia for 30 seconds at a time with quick return to baseline consistent with focal seizure. Resolved once AED started. Continue levetiracetam 750mg bid.    Vision changes/decreased peripheral vision  Follows with neuro-ophthalmology                  Candice Mabry MD  Department of Neurology  Neuro-oncology, Movement Disorders

## 2024-05-15 DIAGNOSIS — C71.9 OLIGODENDROGLIOMA DETERMINED BY BIOPSY OF BRAIN: ICD-10-CM

## 2024-05-15 RX ORDER — TEMOZOLOMIDE 100 MG/1
CAPSULE ORAL
Qty: 20 CAPSULE | Refills: 0 | Status: SHIPPED | OUTPATIENT
Start: 2024-05-15 | End: 2024-06-14 | Stop reason: SDUPTHER

## 2024-05-15 RX ORDER — TEMOZOLOMIDE 5 MG/1
CAPSULE ORAL
Qty: 5 CAPSULE | Refills: 0 | Status: SHIPPED | OUTPATIENT
Start: 2024-05-15 | End: 2024-06-14

## 2024-06-04 ENCOUNTER — PATIENT MESSAGE (OUTPATIENT)
Dept: NEUROSURGERY | Facility: CLINIC | Age: 44
End: 2024-06-04
Payer: COMMERCIAL

## 2024-06-10 ENCOUNTER — PATIENT MESSAGE (OUTPATIENT)
Dept: NEUROSURGERY | Facility: CLINIC | Age: 44
End: 2024-06-10
Payer: COMMERCIAL

## 2024-06-11 DIAGNOSIS — C71.9 OLIGODENDROGLIOMA DETERMINED BY BIOPSY OF BRAIN: ICD-10-CM

## 2024-06-11 RX ORDER — TEMOZOLOMIDE 5 MG/1
CAPSULE ORAL
Qty: 5 CAPSULE | Refills: 0 | OUTPATIENT
Start: 2024-06-11

## 2024-06-11 NOTE — TELEPHONE ENCOUNTER
Pt last seen 2/8/24     temozolomide (TEMODAR) 100 MG capsule     Sig: TAKE FOUR 100MG CAPSULES WITH  ONE 5MG CAPSULE (TOTAL 405MG) BY MOUTH DAILY FOR 5 DAYS OF 28 DAY CYCLE    Disp: 20 capsule    Refills: 0 (Pharmacy requested: Not specified)    Start: 6/5/2024    Class: Normal    For: Oligodendroglioma determined by biopsy of brain    Last ordered: 3 weeks ago (5/15/2024) by Candice Mabry MD    Last refill: 5/18/2024    Rx #: [id number]       To be filled at: [facility] - [place], [place] - [address] [place] [id number]

## 2024-06-13 ENCOUNTER — HOSPITAL ENCOUNTER (OUTPATIENT)
Dept: RADIOLOGY | Facility: HOSPITAL | Age: 44
Discharge: HOME | End: 2024-06-13
Attending: PSYCHIATRY & NEUROLOGY
Payer: COMMERCIAL

## 2024-06-13 DIAGNOSIS — C71.9 OLIGODENDROGLIOMA DETERMINED BY BIOPSY OF BRAIN: ICD-10-CM

## 2024-06-13 PROCEDURE — 70553 MRI BRAIN STEM W/O & W/DYE: CPT | Mod: 26,,, | Performed by: RADIOLOGY

## 2024-06-13 PROCEDURE — 70553 MRI BRAIN STEM W/O & W/DYE: CPT | Mod: TC,PO

## 2024-06-13 PROCEDURE — A9585 GADOBUTROL INJECTION: HCPCS | Mod: PO | Performed by: PSYCHIATRY & NEUROLOGY

## 2024-06-13 PROCEDURE — 25500020 PHARM REV CODE 255: Mod: PO | Performed by: PSYCHIATRY & NEUROLOGY

## 2024-06-13 RX ORDER — GADOBUTROL 604.72 MG/ML
7 INJECTION INTRAVENOUS
Status: COMPLETED | OUTPATIENT
Start: 2024-06-13 | End: 2024-06-13

## 2024-06-13 RX ADMIN — GADOBUTROL 7 ML: 604.72 INJECTION INTRAVENOUS at 12:06

## 2024-06-14 ENCOUNTER — LAB VISIT (OUTPATIENT)
Dept: LAB | Facility: HOSPITAL | Age: 44
End: 2024-06-14
Attending: PSYCHIATRY & NEUROLOGY
Payer: COMMERCIAL

## 2024-06-14 ENCOUNTER — OFFICE VISIT (OUTPATIENT)
Dept: NEUROSURGERY | Facility: CLINIC | Age: 44
End: 2024-06-14
Payer: COMMERCIAL

## 2024-06-14 VITALS
DIASTOLIC BLOOD PRESSURE: 56 MMHG | SYSTOLIC BLOOD PRESSURE: 102 MMHG | HEIGHT: 73 IN | HEART RATE: 65 BPM | WEIGHT: 168.88 LBS | BODY MASS INDEX: 22.38 KG/M2

## 2024-06-14 DIAGNOSIS — H53.9 VISION CHANGES: ICD-10-CM

## 2024-06-14 DIAGNOSIS — E87.5 HYPERKALEMIA: ICD-10-CM

## 2024-06-14 DIAGNOSIS — H53.459 DECREASED PERIPHERAL VISION, UNSPECIFIED LATERALITY: ICD-10-CM

## 2024-06-14 DIAGNOSIS — R56.9 SEIZURE: ICD-10-CM

## 2024-06-14 DIAGNOSIS — C71.9 OLIGODENDROGLIOMA DETERMINED BY BIOPSY OF BRAIN: Primary | ICD-10-CM

## 2024-06-14 LAB
LDH SERPL L TO P-CCNC: 149 U/L (ref 110–260)
POTASSIUM SERPL-SCNC: 5.1 MMOL/L (ref 3.5–5.1)

## 2024-06-14 PROCEDURE — 3074F SYST BP LT 130 MM HG: CPT | Mod: CPTII,S$GLB,, | Performed by: PSYCHIATRY & NEUROLOGY

## 2024-06-14 PROCEDURE — 99999 PR PBB SHADOW E&M-EST. PATIENT-LVL III: CPT | Mod: PBBFAC,,, | Performed by: PSYCHIATRY & NEUROLOGY

## 2024-06-14 PROCEDURE — 1159F MED LIST DOCD IN RCRD: CPT | Mod: CPTII,S$GLB,, | Performed by: PSYCHIATRY & NEUROLOGY

## 2024-06-14 PROCEDURE — 3008F BODY MASS INDEX DOCD: CPT | Mod: CPTII,S$GLB,, | Performed by: PSYCHIATRY & NEUROLOGY

## 2024-06-14 PROCEDURE — 3078F DIAST BP <80 MM HG: CPT | Mod: CPTII,S$GLB,, | Performed by: PSYCHIATRY & NEUROLOGY

## 2024-06-14 PROCEDURE — 36415 COLL VENOUS BLD VENIPUNCTURE: CPT | Performed by: PSYCHIATRY & NEUROLOGY

## 2024-06-14 PROCEDURE — 99215 OFFICE O/P EST HI 40 MIN: CPT | Mod: S$GLB,,, | Performed by: PSYCHIATRY & NEUROLOGY

## 2024-06-14 PROCEDURE — 84132 ASSAY OF SERUM POTASSIUM: CPT | Performed by: PSYCHIATRY & NEUROLOGY

## 2024-06-14 PROCEDURE — 83615 LACTATE (LD) (LDH) ENZYME: CPT | Performed by: PSYCHIATRY & NEUROLOGY

## 2024-06-14 RX ORDER — TEMOZOLOMIDE 5 MG/1
CAPSULE ORAL
Qty: 5 CAPSULE | Refills: 0 | Status: ACTIVE | OUTPATIENT
Start: 2024-06-14

## 2024-06-14 RX ORDER — TEMOZOLOMIDE 100 MG/1
CAPSULE ORAL
Qty: 20 CAPSULE | Refills: 0 | Status: ACTIVE | OUTPATIENT
Start: 2024-06-14

## 2024-06-14 NOTE — PROGRESS NOTES
Subjective:       Patient ID: Carlos Jacobson is a 43 y.o. male.    Chief Complaint: olidendroglioma    HPI  5/2023: presented to urgent care for evaluation of tunnel vision and left foot numbness. CT head showed a left frontal lesion. MRI brain confirmed diffuse, non-enhancing tumor within the temporal and parietal lobes involving the cingulate gyrus and crossing the midline via diffuse infiltration of the posterior corpus callosum  6/13/2023: biopsy of lesion (Michele Schuler) with pathology consistent with oligodendroglioma, WHO grade 2  7/27/2023: resection of lesion with Dr. Segura consistent with oligodendroglioma, IDH-mutant and 1p/19q-codeleted, residual CNS WHO Grade 2  9/6/2023-10/17/2023: chemoradiation (Sena Shelton)  12/2023: C1 TMZ @150mg/m2  1/2024: C2 TMZ @200mg/m2  2/2024: C3 TMZ @200mg/m2  3/2024: C4 TMZ @200mg/m2  4/2024: C5 TMZ @200mg/m2  5/2024: C6 TMZ @200mg/m2    Edison is overall feeling well. He tolerated C6 without issue. No new symptoms.     Past Medical History:   Diagnosis Date    Allergy     Anxiety     Brain tumor     Cancer     Clostridium difficile colitis 04/28/2014    GERD (gastroesophageal reflux disease)     Renal lesion       Current Outpatient Medications   Medication Sig Dispense Refill    ascorbic acid, vitamin C, (VITAMIN C) 1000 MG tablet Take 1,000 mg by mouth once daily.      Lactobacillus acidophilus (PROBIOTIC ORAL) Take 1 tablet by mouth once daily.      levETIRAcetam (KEPPRA) 750 MG Tab Take 1 tablet (750 mg total) by mouth 2 (two) times daily. 180 tablet 3    levocetirizine (XYZAL) 5 MG tablet Take 5 mg by mouth every evening.      memantine (NAMENDA) 10 MG Tab TAKE 1 TABLET (10 MG TOTAL) BY MOUTH 2 (TWO) TIMES DAILY. 60 tablet 4    multivitamin (THERAGRAN) per tablet Take 1 tablet by mouth once daily.      ondansetron (ZOFRAN) 8 MG tablet Take 8mg (1 tab) 30-45 minutes prior to chemotherapy dose and every 8 hours as needed 30 tablet 3    temozolomide (TEMODAR) 100  MG capsule TAKE FOUR 100MG CAPSULES WITH  ONE 5MG CAPSULE (TOTAL 405MG) BY MOUTH DAILY FOR 5 DAYS OF 28 DAY CYCLE 20 capsule 0    temozolomide (TEMODAR) 5 MG capsule TAKE ONE 5MG CAPSULE WITH FOUR  100MG CAPSULES (TOTAL 405MG) BY  MOUTH DAILY FOR 5 DAYS OF 28 DAY CYCLE 5 capsule 0     No current facility-administered medications for this visit.      Past Surgical History:   Procedure Laterality Date    BILATERAL INGUINAL HERNIA REPAIR Bilateral 1987    COLONOSCOPY  2014    CRANIOTOMY Left 7/27/2023    Procedure: CRANIOTOMY;  Surgeon: Michele Schuler MD;  Location: [facility] OR [address] [place] [address];  Service: Neurosurgery;  Laterality: Left;  LEFT PARIETAL CRANIOTOMY WITH RESECTION OF BRAIN MASS WITH DR. MIMS    STEREOTACTIC BIOPSY OF BRAIN Left 6/13/2023    Procedure: BIOPSY, BRAIN, STEREOTACTIC - LEFT PARIETAL BRAIN BIOPSY;  Surgeon: Michele Schuler MD;  Location: [facility];  Service: Neurosurgery;  Laterality: Left;      Family History   Problem Relation Name Age of Onset    Hyperlipidemia Mother      Hyperlipidemia Father      No Known Problems Sister      Hyperlipidemia Brother      Hyperlipidemia Maternal Uncle      Diabetes Maternal Grandmother      Lung cancer Maternal Grandmother        Social History     Tobacco Use    Smoking status: Some Days     Types: Cigars    Smokeless tobacco: Never    Tobacco comments:     Cigar once a year   Substance Use Topics    Alcohol use: No     Comment: occasionally    Drug use: No      Review of Systems   Constitutional:  Negative for fatigue, fever and unexpected weight change.   HENT:  Negative for dental problem and drooling.    Eyes:  Positive for photophobia and visual disturbance.   Respiratory:  Negative for cough and shortness of breath.    Cardiovascular:  Negative for chest pain and leg swelling.   Gastrointestinal:  Negative for abdominal pain and nausea.   Genitourinary:  Negative for difficulty urinating and dysuria.   Musculoskeletal:  Negative for arthralgias and back  pain.   Neurological:  Positive for headaches. Negative for dizziness, seizures, weakness and numbness.   Psychiatric/Behavioral:  Negative for agitation and confusion.      KPS: 90      Objective:      There were no vitals filed for this visit.    NEUROLOGICAL EXAMINATION:     MENTAL STATUS   Attention: normal. Concentration: normal.   Speech: speech is normal   Level of consciousness: alert    CRANIAL NERVES     CN II   Right visual field deficit: Right homonymous hemainopia.    CN III, IV, VI   Extraocular motions are normal.     CN V   Facial sensation intact.     CN VII   Facial expression full, symmetric.     CN VIII   CN VIII normal.     CN IX, X   CN IX normal.   CN X normal.     CN XI   CN XI normal.     CN XII   CN XII normal.     MOTOR EXAM     Strength   Strength 5/5 throughout.     SENSORY EXAM   Light touch normal.     GAIT AND COORDINATION     Gait  Gait: normal     Coordination   Finger to nose coordination: normal    MRI brain from 6/13/2024 was personally visualized and compared to prior. Per my read, there is no evidence of tumor progression.      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 male with an oligodendroglioma, WHO grade 2 s/p subtotal resection, chemoradiation, and ongoing adjuvant TMZ presenting for follow up. Tumor diagnosis was heralded by left foot numbness and tunnel vision. Current symptoms include visual changes.    Oligodendroglioma  Undergoing adjuvant TMZ. Most recent MRI brain is stable. Labs are adequate for continuation of adjuvant TMZ C6 @200mg/m2. F/u in 4 weeks with MRI brain and CBC/CMP in anticipation of C7 TMZ.     Seizure  Had episodic aphasia for 30 seconds at a time with quick return to  baseline consistent with focal seizure. Resolved once AED started. Continue levetiracetam 750mg bid.    Vision changes/decreased peripheral vision  Follows with neuro-ophthalmology     Hyperkalemia  Elevated to 5.8 on CMP yesterday. Repeating today with LDH. I suspect pseudohyperkalemia due to no causative medications. If persistently elevated, will send for urgent EKG.                 Candice Mabry MD  Department of Neurology  Neuro-oncology, Movement Disorders

## 2024-06-27 ENCOUNTER — PATIENT MESSAGE (OUTPATIENT)
Dept: NEUROLOGY | Facility: CLINIC | Age: 44
End: 2024-06-27
Payer: COMMERCIAL

## 2024-06-27 ENCOUNTER — PATIENT MESSAGE (OUTPATIENT)
Dept: NEUROSURGERY | Facility: CLINIC | Age: 44
End: 2024-06-27
Payer: COMMERCIAL

## 2024-07-05 DIAGNOSIS — C71.9 OLIGODENDROGLIOMA DETERMINED BY BIOPSY OF BRAIN: ICD-10-CM

## 2024-07-11 ENCOUNTER — TELEPHONE (OUTPATIENT)
Dept: NEUROLOGY | Facility: CLINIC | Age: 44
End: 2024-07-11
Payer: COMMERCIAL

## 2024-07-12 ENCOUNTER — LAB VISIT (OUTPATIENT)
Dept: LAB | Facility: HOSPITAL | Age: 44
End: 2024-07-12
Attending: PSYCHIATRY & NEUROLOGY
Payer: COMMERCIAL

## 2024-07-12 ENCOUNTER — OFFICE VISIT (OUTPATIENT)
Dept: NEUROSURGERY | Facility: CLINIC | Age: 44
End: 2024-07-12
Payer: COMMERCIAL

## 2024-07-12 VITALS
BODY MASS INDEX: 22.29 KG/M2 | HEIGHT: 73 IN | WEIGHT: 168.19 LBS | HEART RATE: 72 BPM | DIASTOLIC BLOOD PRESSURE: 63 MMHG | SYSTOLIC BLOOD PRESSURE: 101 MMHG

## 2024-07-12 DIAGNOSIS — H53.459 DECREASED PERIPHERAL VISION, UNSPECIFIED LATERALITY: ICD-10-CM

## 2024-07-12 DIAGNOSIS — R56.9 SEIZURE: ICD-10-CM

## 2024-07-12 DIAGNOSIS — C71.9 OLIGODENDROGLIOMA DETERMINED BY BIOPSY OF BRAIN: Primary | ICD-10-CM

## 2024-07-12 DIAGNOSIS — C71.9 OLIGODENDROGLIOMA DETERMINED BY BIOPSY OF BRAIN: ICD-10-CM

## 2024-07-12 DIAGNOSIS — H53.9 VISION CHANGES: ICD-10-CM

## 2024-07-12 LAB
ALBUMIN SERPL BCP-MCNC: 4 G/DL (ref 3.5–5.2)
ALP SERPL-CCNC: 68 U/L (ref 55–135)
ALT SERPL W/O P-5'-P-CCNC: 16 U/L (ref 10–44)
ANION GAP SERPL CALC-SCNC: 8 MMOL/L (ref 8–16)
AST SERPL-CCNC: 14 U/L (ref 10–40)
BASOPHILS # BLD AUTO: 0.01 K/UL (ref 0–0.2)
BASOPHILS NFR BLD: 0.3 % (ref 0–1.9)
BILIRUB SERPL-MCNC: 0.7 MG/DL (ref 0.1–1)
BUN SERPL-MCNC: 17 MG/DL (ref 6–20)
CALCIUM SERPL-MCNC: 9.4 MG/DL (ref 8.7–10.5)
CHLORIDE SERPL-SCNC: 106 MMOL/L (ref 95–110)
CO2 SERPL-SCNC: 28 MMOL/L (ref 23–29)
CREAT SERPL-MCNC: 0.9 MG/DL (ref 0.5–1.4)
DIFFERENTIAL METHOD BLD: ABNORMAL
EOSINOPHIL # BLD AUTO: 0.1 K/UL (ref 0–0.5)
EOSINOPHIL NFR BLD: 3.4 % (ref 0–8)
ERYTHROCYTE [DISTWIDTH] IN BLOOD BY AUTOMATED COUNT: 12.5 % (ref 11.5–14.5)
EST. GFR  (NO RACE VARIABLE): >60 ML/MIN/1.73 M^2
GLUCOSE SERPL-MCNC: 90 MG/DL (ref 70–110)
HCT VFR BLD AUTO: 39.6 % (ref 40–54)
HGB BLD-MCNC: 13.7 G/DL (ref 14–18)
IMM GRANULOCYTES # BLD AUTO: 0.01 K/UL (ref 0–0.04)
IMM GRANULOCYTES NFR BLD AUTO: 0.3 % (ref 0–0.5)
LYMPHOCYTES # BLD AUTO: 0.8 K/UL (ref 1–4.8)
LYMPHOCYTES NFR BLD: 22.2 % (ref 18–48)
MCH RBC QN AUTO: 32.8 PG (ref 27–31)
MCHC RBC AUTO-ENTMCNC: 34.6 G/DL (ref 32–36)
MCV RBC AUTO: 95 FL (ref 82–98)
MONOCYTES # BLD AUTO: 0.4 K/UL (ref 0.3–1)
MONOCYTES NFR BLD: 11.1 % (ref 4–15)
NEUTROPHILS # BLD AUTO: 2.4 K/UL (ref 1.8–7.7)
NEUTROPHILS NFR BLD: 62.7 % (ref 38–73)
NRBC BLD-RTO: 0 /100 WBC
PLATELET # BLD AUTO: 141 K/UL (ref 150–450)
PMV BLD AUTO: 9.2 FL (ref 9.2–12.9)
POTASSIUM SERPL-SCNC: 4.5 MMOL/L (ref 3.5–5.1)
PROT SERPL-MCNC: 7 G/DL (ref 6–8.4)
RBC # BLD AUTO: 4.18 M/UL (ref 4.6–6.2)
SODIUM SERPL-SCNC: 142 MMOL/L (ref 136–145)
WBC # BLD AUTO: 3.78 K/UL (ref 3.9–12.7)

## 2024-07-12 PROCEDURE — 80053 COMPREHEN METABOLIC PANEL: CPT | Performed by: PSYCHIATRY & NEUROLOGY

## 2024-07-12 PROCEDURE — 36415 COLL VENOUS BLD VENIPUNCTURE: CPT | Performed by: PSYCHIATRY & NEUROLOGY

## 2024-07-12 PROCEDURE — 85025 COMPLETE CBC W/AUTO DIFF WBC: CPT | Performed by: PSYCHIATRY & NEUROLOGY

## 2024-07-12 PROCEDURE — 99999 PR PBB SHADOW E&M-EST. PATIENT-LVL III: CPT | Mod: PBBFAC,,, | Performed by: PSYCHIATRY & NEUROLOGY

## 2024-07-12 RX ORDER — TEMOZOLOMIDE 100 MG/1
CAPSULE ORAL
Qty: 20 CAPSULE | Refills: 0 | Status: SHIPPED | OUTPATIENT
Start: 2024-07-12

## 2024-07-12 RX ORDER — TEMOZOLOMIDE 5 MG/1
CAPSULE ORAL
Qty: 5 CAPSULE | Refills: 0 | Status: SHIPPED | OUTPATIENT
Start: 2024-07-12

## 2024-07-12 NOTE — PROGRESS NOTES
Subjective:       Patient ID: Carlos Jacobson is a 43 y.o. male.    Chief Complaint: olidendroglioma    Follow-up  Associated symptoms include headaches. Pertinent negatives include no abdominal pain, arthralgias, chest pain, coughing, fatigue, fever, nausea, numbness or weakness.     5/2023: presented to urgent care for evaluation of tunnel vision and left foot numbness. CT head showed a left frontal lesion. MRI brain confirmed diffuse, non-enhancing tumor within the temporal and parietal lobes involving the cingulate gyrus and crossing the midline via diffuse infiltration of the posterior corpus callosum  6/13/2023: biopsy of lesion (Michele Schuler) with pathology consistent with oligodendroglioma, WHO grade 2  7/27/2023: resection of lesion with Dr. Segura consistent with oligodendroglioma, IDH-mutant and 1p/19q-codeleted, residual CNS WHO Grade 2  9/6/2023-10/17/2023: chemoradiation (Sena Shelton)  12/2023: C1 TMZ @150mg/m2  1/2024: C2 TMZ @200mg/m2  2/2024: C3 TMZ @200mg/m2  3/2024: C4 TMZ @200mg/m2  4/2024: C5 TMZ @200mg/m2  5/2024: C6 TMZ @200mg/m2  6/2024: C7 TMZ @200mg/m2    Edison is overall feeling well. He tolerated C7 with stable fatigue. He has noticed more blurring of vision with reading that is improved with reading glasses. Otherwise, no new symptoms.     Past Medical History:   Diagnosis Date    Allergy     Anxiety     Brain tumor     Cancer     Clostridium difficile colitis 04/28/2014    GERD (gastroesophageal reflux disease)     Renal lesion       Current Outpatient Medications   Medication Sig Dispense Refill    ascorbic acid, vitamin C, (VITAMIN C) 1000 MG tablet Take 1,000 mg by mouth once daily.      Lactobacillus acidophilus (PROBIOTIC ORAL) Take 1 tablet by mouth once daily.      levETIRAcetam (KEPPRA) 750 MG Tab Take 1 tablet (750 mg total) by mouth 2 (two) times daily. 180 tablet 3    levocetirizine (XYZAL) 5 MG tablet Take 5 mg by mouth every evening.      memantine (NAMENDA) 10 MG Tab  TAKE 1 TABLET (10 MG TOTAL) BY MOUTH 2 (TWO) TIMES DAILY. 60 tablet 4    multivitamin (THERAGRAN) per tablet Take 1 tablet by mouth once daily.      ondansetron (ZOFRAN) 8 MG tablet Take 8mg (1 tab) 30-45 minutes prior to chemotherapy dose and every 8 hours as needed 30 tablet 3    temozolomide (TEMODAR) 100 MG capsule TAKE FOUR 100MG CAPSULES WITH  ONE 5MG CAPSULE (405MG TOTAL) BY MOUTH DAILY FOR 5 DAYS OF 28 DAY CYCLE 20 capsule 0    temozolomide (TEMODAR) 5 MG capsule TAKE ONE 5MG CAPSULE WITH FOUR  100MG CAPSULES (405MG TOTAL) BY  MOUTH DAILY FOR 5 DAYS OF 28 DAY CYCLE 5 capsule 0     No current facility-administered medications for this visit.      Past Surgical History:   Procedure Laterality Date    BILATERAL INGUINAL HERNIA REPAIR Bilateral 1987    COLONOSCOPY  2014    CRANIOTOMY Left 7/27/2023    Procedure: CRANIOTOMY;  Surgeon: Michele Schuler MD;  Location: [address];  Service: Neurosurgery;  Laterality: Left;  LEFT PARIETAL CRANIOTOMY WITH RESECTION OF BRAIN MASS WITH DR. MIMS    STEREOTACTIC BIOPSY OF BRAIN Left 6/13/2023    Procedure: BIOPSY, BRAIN, STEREOTACTIC - LEFT PARIETAL BRAIN BIOPSY;  Surgeon: Michele Schuler MD;  Location: [facility];  Service: Neurosurgery;  Laterality: Left;      Family History   Problem Relation Name Age of Onset    Hyperlipidemia Mother      Hyperlipidemia Father      No Known Problems Sister      Hyperlipidemia Brother      Hyperlipidemia Maternal Uncle      Diabetes Maternal Grandmother      Lung cancer Maternal Grandmother        Social History     Tobacco Use    Smoking status: Some Days     Types: Cigars    Smokeless tobacco: Never    Tobacco comments:     Cigar once a year   Substance Use Topics    Alcohol use: No     Comment: occasionally    Drug use: No      Review of Systems   Constitutional:  Negative for fatigue, fever and unexpected weight change.   HENT:  Negative for dental problem and drooling.    Eyes:  Positive for photophobia and visual  disturbance.   Respiratory:  Negative for cough and shortness of breath.    Cardiovascular:  Negative for chest pain and leg swelling.   Gastrointestinal:  Negative for abdominal pain and nausea.   Genitourinary:  Negative for difficulty urinating and dysuria.   Musculoskeletal:  Negative for arthralgias and back pain.   Neurological:  Positive for headaches. Negative for dizziness, seizures, weakness and numbness.   Psychiatric/Behavioral:  Negative for agitation and confusion.      KPS: 90      Objective:      Vitals:    07/12/24 0842   BP: 101/63   Pulse: 72       NEUROLOGICAL EXAMINATION:     MENTAL STATUS   Attention: normal. Concentration: normal.   Speech: speech is normal   Level of consciousness: alert    CRANIAL NERVES     CN II   Right visual field deficit: Right homonymous hemainopia.    CN III, IV, VI   Extraocular motions are normal.     CN V   Facial sensation intact.     CN VII   Facial expression full, symmetric.     CN VIII   CN VIII normal.     CN IX, X   CN IX normal.   CN X normal.     CN XI   CN XI normal.     CN XII   CN XII normal.     MOTOR EXAM     Strength   Strength 5/5 throughout.     SENSORY EXAM   Light touch normal.     GAIT AND COORDINATION     Gait  Gait: normal     Coordination   Finger to nose coordination: normal    MRI brain from 6/13/2024 was personally visualized and compared to prior. Per my read, there is no evidence of tumor progression.      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 male with an oligodendroglioma, WHO grade 2 s/p subtotal resection, chemoradiation, and ongoing adjuvant TMZ presenting for follow up. Tumor diagnosis was heralded by left foot numbness and tunnel vision. Current  symptoms include visual changes.    Oligodendroglioma  Undergoing adjuvant TMZ. Most recent MRI brain is stable. Labs are adequate for continuation of adjuvant TMZ C8 @200mg/m2. F/u in 4 weeks with MRI brain and CBC/CMP in anticipation of C9 TMZ. Next MRI brain due 9/2024.    Seizure  Had episodic aphasia for 30 seconds at a time with quick return to baseline consistent with focal seizure. Resolved once AED started. Continue levetiracetam 750mg bid.    Vision changes/decreased peripheral vision  Follows with neuro-ophthalmology                    Candice Mabry MD  Department of Neurology  Neuro-oncology, Movement Disorders

## 2024-07-16 ENCOUNTER — OFFICE VISIT (OUTPATIENT)
Dept: NEUROSURGERY | Facility: CLINIC | Age: 44
End: 2024-07-16
Payer: COMMERCIAL

## 2024-07-16 VITALS
DIASTOLIC BLOOD PRESSURE: 68 MMHG | BODY MASS INDEX: 22.29 KG/M2 | SYSTOLIC BLOOD PRESSURE: 101 MMHG | WEIGHT: 168.19 LBS | HEART RATE: 81 BPM | RESPIRATION RATE: 18 BRPM | HEIGHT: 73 IN

## 2024-07-16 DIAGNOSIS — Z98.890 S/P CRANIOTOMY: ICD-10-CM

## 2024-07-16 DIAGNOSIS — C71.9 OLIGODENDROGLIOMA DETERMINED BY BIOPSY OF BRAIN: Primary | ICD-10-CM

## 2024-07-16 PROCEDURE — 3078F DIAST BP <80 MM HG: CPT | Mod: CPTII,S$GLB,, | Performed by: NEUROLOGICAL SURGERY

## 2024-07-16 PROCEDURE — 1159F MED LIST DOCD IN RCRD: CPT | Mod: CPTII,S$GLB,, | Performed by: NEUROLOGICAL SURGERY

## 2024-07-16 PROCEDURE — 99214 OFFICE O/P EST MOD 30 MIN: CPT | Mod: S$GLB,,, | Performed by: NEUROLOGICAL SURGERY

## 2024-07-16 PROCEDURE — 3074F SYST BP LT 130 MM HG: CPT | Mod: CPTII,S$GLB,, | Performed by: NEUROLOGICAL SURGERY

## 2024-07-16 PROCEDURE — 3008F BODY MASS INDEX DOCD: CPT | Mod: CPTII,S$GLB,, | Performed by: NEUROLOGICAL SURGERY

## 2024-07-16 NOTE — PROGRESS NOTES
Neurosurgery History & Physical    Patient ID: Carlos Jacobson is a 43 y.o. male.    No chief complaint on file.    Interval HPI 7/16/2024:  He has been undergoing monthly TMZ cycles since 12/2023.  He should begin his monthly round tonight if it arrives in the mail today.  He reports that TMZ cycles are scheduled to end in November 2024 but is unsure of the plan beyond that.  He had radiation from 9/6/2023 - 10/17/2023.    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  He reports increased fatigue by the third day of his TMZ cycle.  He denies neurologic symptoms.    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  He likes to garden which he uses as therapy.      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  He has been doing well overall.  He continues to have a right sided visual field cut.       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  Patient returns today for a general postoperative visit, review of pathology, and discussion of next steps.  The patient denies any issues with his wound.  He did see Dr. Segura this morning at the request of Dr. Mabry with Neuro-Oncology.     History of Present Illness 6/13/2023: Mr. Jacobson is a 42 year old male who presents today for evaluation of recent brain imaging. Over the last 6 months, he reports pressure in his head and tinnitus at times. He has associated these symptoms with anxiety which he has struggled with over the last few years. He reports two episodes of left foot numbness and tunnel vision in recent weeks which lead him to seek evaluation at the urgent care. A CT was performed there which showed a brain lesion and PCP ordered MRI  brain and CT C/A/P.      He feels that he has trouble with coordination when walking down stairs. Otherwise denies other neurologic symptom.      He has seen Urology regarding CT CAP which demonstrates small left renal lesion. They recommended follow up in 3 months for monitoring.        Review of Systems   Constitutional:  Positive for fatigue. Negative for activity change and fever.   Eyes:  Negative for visual disturbance.   Respiratory:  Negative for shortness of breath.    Cardiovascular:  Negative for chest pain.   Gastrointestinal:  Negative for nausea and vomiting.   Endocrine: Negative for cold intolerance, heat intolerance, polydipsia, polyphagia and polyuria.   Genitourinary:  Negative for decreased urine volume, difficulty urinating and frequency.   Musculoskeletal:  Negative for back pain, gait problem and neck pain.   Neurological:  Negative for dizziness, tremors, seizures, syncope, facial asymmetry, speech difficulty, weakness, light-headedness, numbness and headaches.   Psychiatric/Behavioral:  Negative for confusion.        Past Medical History:   Diagnosis Date    Allergy     Anxiety     Brain tumor     Cancer     Clostridium difficile colitis 04/28/2014    GERD (gastroesophageal reflux disease)     Renal lesion      Social History     Socioeconomic History    Marital status: Single   Tobacco Use    Smoking status: Some Days     Types: Cigars    Smokeless tobacco: Never    Tobacco comments:     Cigar once a year   Substance and Sexual Activity    Alcohol use: No     Comment: occasionally    Drug use: No    Sexual activity: Never     Social Determinants of Health     Financial Resource Strain: Low Risk  (2/8/2024)    Overall Financial Resource Strain (CARDIA)     Difficulty of Paying Living Expenses: Not hard at all   Food Insecurity: No Food Insecurity (2/8/2024)    Hunger Vital Sign     Worried About Running Out of Food in the Last Year: Never true     Ran Out of Food in the Last Year: Never true    Transportation Needs: No Transportation Needs (2/8/2024)    PRAPARE - Transportation     Lack of Transportation (Medical): No     Lack of Transportation (Non-Medical): No   Physical Activity: Sufficiently Active (2/8/2024)    Exercise Vital Sign     Days of Exercise per Week: 3 days     Minutes of Exercise per Session: 60 min   Stress: No Stress Concern Present (2/8/2024)    [employer] [place] [employer] - Occupational Stress Questionnaire     Feeling of Stress : Not at all   Housing Stability: Low Risk  (2/8/2024)    Housing Stability Vital Sign     Unable to Pay for Housing in the Last Year: No     Number of Places Lived in the Last Year: 2     Unstable Housing in the Last Year: No     Family History   Problem Relation Name Age of Onset    Hyperlipidemia Mother      Hyperlipidemia Father      No Known Problems Sister      Hyperlipidemia Brother      Hyperlipidemia Maternal Uncle      Diabetes Maternal Grandmother      Lung cancer Maternal Grandmother       Review of patient's allergies indicates:  No Known Allergies    Current Outpatient Medications:     ascorbic acid, vitamin C, (VITAMIN C) 1000 MG tablet, Take 1,000 mg by mouth once daily., Disp: , Rfl:     Lactobacillus acidophilus (PROBIOTIC ORAL), Take 1 tablet by mouth once daily., Disp: , Rfl:     levETIRAcetam (KEPPRA) 750 MG Tab, Take 1 tablet (750 mg total) by mouth 2 (two) times daily., Disp: 180 tablet, Rfl: 3    levocetirizine (XYZAL) 5 MG tablet, Take 5 mg by mouth every evening., Disp: , Rfl:     memantine (NAMENDA) 10 MG Tab, TAKE 1 TABLET (10 MG TOTAL) BY MOUTH 2 (TWO) TIMES DAILY., Disp: 60 tablet, Rfl: 4    multivitamin (THERAGRAN) per tablet, Take 1 tablet by mouth once daily., Disp: , Rfl:     ondansetron (ZOFRAN) 8 MG tablet, Take 8mg (1 tab) 30-45 minutes prior to chemotherapy dose and every 8 hours as needed, Disp: 30 tablet, Rfl: 3    temozolomide (TEMODAR) 100 MG capsule, TAKE FOUR 100MG CAPSULES WITH  ONE 5MG CAPSULE (405MG  TOTAL) BY MOUTH DAILY FOR 5 DAYS OF 28 DAY CYCLE, Disp: 20 capsule, Rfl: 0    temozolomide (TEMODAR) 5 MG capsule, TAKE ONE 5MG CAPSULE WITH FOUR  100MG CAPSULES (405MG TOTAL) BY  MOUTH DAILY FOR 5 DAYS OF 28 DAY CYCLE, Disp: 5 capsule, Rfl: 0  There were no vitals taken for this visit.      Neurologic Exam     Mental Status   Oriented to person, place, and time.     Cranial Nerves   Cranial nerves II through XII intact.     Motor Exam     Strength   Strength 5/5 throughout.     Sensory Exam   Light touch normal.     Gait, Coordination, and Reflexes     Gait  Gait: normal      Physical Exam  Neurological:      Mental Status: He is oriented to person, place, and time.      Cranial Nerves: Cranial nerves 2-12 are intact.      Motor: Motor strength is normal.     Gait: Gait is intact.         Imaging:  MRI brain with and without contrast dated   06/13/2024 is personally reviewed and compared to MRI of the brain without contrast 03/08/2024.  The prior resection cavity is noted in the left parietal occipital region it appears unchanged to   Prior.  There is continued T2 signal change surrounding the resection cavity which is unchanged to prior.  There is no definite abnormal enhancement.  When compared to immediate post-operative scan, the areas surrounding the resection cavity that were T2 hyperintense have reduced in size significantly indicating response to chemotherapy/radiation.    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  He is scheduled to be finished with his TMZ treatments in November 2024.  He should follow-up in our office in mid-to-late December 2024 after obtaining a MRI brain with and without contrast.  Father Kavon should notify our office if he has any questions or concerns prior to his next appointment.

## 2024-07-23 ENCOUNTER — OFFICE VISIT (OUTPATIENT)
Dept: RADIATION ONCOLOGY | Facility: CLINIC | Age: 44
End: 2024-07-23
Payer: COMMERCIAL

## 2024-07-23 VITALS
WEIGHT: 166 LBS | HEART RATE: 65 BPM | BODY MASS INDEX: 21.9 KG/M2 | OXYGEN SATURATION: 100 % | DIASTOLIC BLOOD PRESSURE: 65 MMHG | TEMPERATURE: 98 F | RESPIRATION RATE: 16 BRPM | SYSTOLIC BLOOD PRESSURE: 113 MMHG

## 2024-07-23 DIAGNOSIS — C71.9 OLIGODENDROGLIOMA DETERMINED BY BIOPSY OF BRAIN: Primary | ICD-10-CM

## 2024-07-23 PROCEDURE — 3078F DIAST BP <80 MM HG: CPT | Mod: CPTII,S$GLB,, | Performed by: RADIOLOGY

## 2024-07-23 PROCEDURE — 1159F MED LIST DOCD IN RCRD: CPT | Mod: CPTII,S$GLB,, | Performed by: RADIOLOGY

## 2024-07-23 PROCEDURE — 99212 OFFICE O/P EST SF 10 MIN: CPT | Mod: S$GLB,,, | Performed by: RADIOLOGY

## 2024-07-23 PROCEDURE — 3008F BODY MASS INDEX DOCD: CPT | Mod: CPTII,S$GLB,, | Performed by: RADIOLOGY

## 2024-07-23 PROCEDURE — 99999 PR PBB SHADOW E&M-EST. PATIENT-LVL III: CPT | Mod: PBBFAC,,, | Performed by: RADIOLOGY

## 2024-07-23 PROCEDURE — 3074F SYST BP LT 130 MM HG: CPT | Mod: CPTII,S$GLB,, | Performed by: RADIOLOGY

## 2024-07-23 NOTE — PROGRESS NOTES
[facility] Department of Radiation Oncology  Follow Up Visit Note    Diagnosis:  Carlos Jacobson is a 43 y.o. 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       Oncologic History:  Oncology History   Oligodendroglioma determined by biopsy of brain   6/29/2023 Initial Diagnosis    Oligodendroglioma determined by biopsy of brain     6/29/2023 Cancer Staged    Staging form: Brain and Spinal Cord, AJCC 8th Edition  - Pathologic stage from 6/29/2023: WHO Grade II     8/18/2023 -  Chemotherapy    Treatment Summary   Plan Name: OP TEMOZOLOMIDE DAILY (6 WEEKS) WITH CONCURRENT RADIATION  Treatment Goal: Control  Status: Active  Start Date: 8/18/2023  End Date: 8/18/2023  Provider: Candice Mabry MD  Chemotherapy: temozolomide (TEMODAR) capsule 155 mg, 75 mg/m2 = 155 mg, Oral, Daily, 1 of 1 cycle, Start date: 8/18/2023, End date: 9/29/2023 9/6/2023 - 10/17/2023 Radiation Therapy    Treating physician: Sena Shelton  Total Dose: 54 Gy  Fractions: 30  Treatment Site Ref. ID Energy Dose/Fx (Gy) #Fx Dose Correction (Gy) Total Dose (Gy) Start Date End Date Elapsed Days   IM Brain Brain 6X 1.8 30 / 30 0 54 9/6/2023 10/17/2023 41             Interval History  The patient presents today for a regularly scheduled follow up visit.  He was last seen in our clinic on 3/19/24.   Since that time,  continued adjuvant temozolamide with Dr. Mabry.     6/13/24 MRI Brain: stable post-op changes left parietal craniotomy with underlying partial tumor resection, without interval detrimental change; stable T2/FLAIR    7/16/24 seen/evaluated by Dr. Schuler    Vision improving (now able to read with both eyes, medium range has some issues, ongoing right peripheral field deficit, but improving), never had issues with distant  vision. Notes fatigue during chemotherapy weeks. 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   Underwent CT head which was concerning for brain mass.      5/26/23 CT chest, abdomen, and pelvis: 1.3cm enhancing left renal lesion suspicious for neoplasm.     5/26/23 MRI Brain: large, ill-defined infiltrative appearing tumor centered in left parietal lobe and mesial temporal lobe, diffuse infiltration of posterior cingulate gyrus and corpus callosum across midline, 3mm midline shift, partial effacement of lateral ventricles.      6/13/23 L parietal biopsy: oligodendroglioma, WHO Grade 2.     On further consideration- patient can be classified as high-risk, low-grade disease (age >40, >6cm, crossing midline), and thus a candidate for concurrent TMZ-radiation therapy followed by adjuvant TMZ for improved outcomes.  Case discussed with Dr. Mabry.  She will obtain fMRI and present at upcoming Neuro-Onc TB to see if further resection/debulking is possible. Additionally, [id number] with Dr. Riley of [facility] was perfomed    7/27/23 Additional resection: oligodendroglioma    11/17/23 MRI brain: evolving post-op changes in left parietal lobe with overall favorable response, decrease in residual non-enhancing tumor involving corpus callosum, paramedial parietal lobe, temporal lobe.    developed acneiform rash after shaving head- responded to doxy.    Adjuvant Temodar with Dr. Mabry    3/8/24 MRI brain: decreased non-enhancing T2 signal abnormality along left parietal lobe     Possibility of pregnancy: No  History of prior irradiation: No  History of prior systemic anti-cancer therapy: No  History of collagen vascular disease: No  Implanted electronic device (pacer/defib/nerve stimulator): No    Review of Systems   Review of Systems   Constitutional:  Negative for chills, fever  "and malaise/fatigue.   HENT:  Positive for tinnitus (~10s, perhaps once/week, improving). Negative for hearing loss.    Eyes:  Positive for blurred vision (blurry at mid-distant). Negative for double vision (resolved).   Respiratory:  Negative for cough and shortness of breath.    Gastrointestinal:  Positive for constipation (from Zofran). Negative for nausea and vomiting.   Musculoskeletal:  Negative for back pain, joint pain, myalgias and neck pain.   Skin:  Negative for rash (resolved).   Neurological:  Negative for dizziness (resolved), sensory change, speech change, seizures, weakness and headaches.   Psychiatric/Behavioral:  Negative for hallucinations. The patient is not nervous/anxious.      Feeling more "lucid" but still somewhat run down    Social History:  Social History     Tobacco Use    Smoking status: Some Days     Types: Cigars    Smokeless tobacco: Never    Tobacco comments:     Cigar once a year   Substance Use Topics    Alcohol use: No     Comment: occasionally    Drug use: No       Family History:  Cancer-related family history includes Lung cancer in his maternal grandmother.    Exam:  Vitals:    07/23/24 1259   BP: 113/65   Pulse: 65   Resp: 16   Temp: 98.2 °F (36.8 °C)   SpO2: 100%   Weight: 75.3 kg (166 lb 0.1 oz)       Constitutional: Pleasant 43 y.o. male in no acute distress.  Well nourished. Well groomed.   HEENT: Normocephalic and ; treatment related alopecia, rash resolved, well healed incision  Lungs: No audible wheezing.  Normal effort.   Musculoskeletal: No gross MSK deformities. Ambulates  Skin: No rashes appreciated.   Psych: Alert and oriented with appropriate mood and affect.  Neuro:   Grossly normal. CN II-XII intact grossly (visual fields not tested), strength and sensation intact throughout, no deficit in ROM, targeting    Data Review:    Independent Interpretation of Test(s): MRI brain 3/8/24 was personally reviewed as detailed above      Assessment:  Recovering well from " acute radiation therapy related toxicities.  Improvement on MRI  ECOG: (1) Restricted in physically strenuous activity, ambulatory and able to do work of light nature    Plan:  Follow up in 12 months  Follow up with Dr. Schuler and Dr. Mabry as directed  Follow up with Ophtho/visual field testing next week  Follow up with Dr. Mabry 7/25/2024 for adjuvant Temodar (scheduled to continue until 11/2024)  MRI brain in 1-2 months (per Dr. Mabry schedule)  He was given our contact information, and he was told that he could call our clinic at anytime if he has any questions or concerns.  Follow up with other providers as directed

## 2024-07-25 ENCOUNTER — PATIENT MESSAGE (OUTPATIENT)
Dept: NEUROSURGERY | Facility: CLINIC | Age: 44
End: 2024-07-25
Payer: COMMERCIAL

## 2024-07-25 ENCOUNTER — PATIENT MESSAGE (OUTPATIENT)
Dept: NEUROLOGY | Facility: CLINIC | Age: 44
End: 2024-07-25
Payer: COMMERCIAL

## 2024-07-30 ENCOUNTER — PATIENT MESSAGE (OUTPATIENT)
Dept: NEUROSURGERY | Facility: CLINIC | Age: 44
End: 2024-07-30
Payer: COMMERCIAL

## 2024-07-30 DIAGNOSIS — R56.9 SEIZURE: ICD-10-CM

## 2024-08-02 RX ORDER — LEVETIRACETAM 750 MG/1
750 TABLET ORAL 2 TIMES DAILY
Qty: 180 TABLET | Refills: 3 | Status: SHIPPED | OUTPATIENT
Start: 2024-08-02

## 2024-08-03 DIAGNOSIS — C71.9 OLIGODENDROGLIOMA DETERMINED BY BIOPSY OF BRAIN: ICD-10-CM

## 2024-08-09 ENCOUNTER — LAB VISIT (OUTPATIENT)
Dept: LAB | Facility: HOSPITAL | Age: 44
End: 2024-08-09
Attending: PSYCHIATRY & NEUROLOGY
Payer: COMMERCIAL

## 2024-08-09 ENCOUNTER — OFFICE VISIT (OUTPATIENT)
Dept: NEUROSURGERY | Facility: CLINIC | Age: 44
End: 2024-08-09
Payer: COMMERCIAL

## 2024-08-09 VITALS
BODY MASS INDEX: 22.32 KG/M2 | HEART RATE: 76 BPM | WEIGHT: 168.44 LBS | HEIGHT: 73 IN | DIASTOLIC BLOOD PRESSURE: 60 MMHG | SYSTOLIC BLOOD PRESSURE: 103 MMHG

## 2024-08-09 DIAGNOSIS — C71.9 OLIGODENDROGLIOMA DETERMINED BY BIOPSY OF BRAIN: Primary | ICD-10-CM

## 2024-08-09 DIAGNOSIS — R56.9 SEIZURE: ICD-10-CM

## 2024-08-09 DIAGNOSIS — C71.9 OLIGODENDROGLIOMA DETERMINED BY BIOPSY OF BRAIN: ICD-10-CM

## 2024-08-09 LAB
ALBUMIN SERPL BCP-MCNC: 4.1 G/DL (ref 3.5–5.2)
ALP SERPL-CCNC: 64 U/L (ref 55–135)
ALT SERPL W/O P-5'-P-CCNC: 13 U/L (ref 10–44)
ANION GAP SERPL CALC-SCNC: 10 MMOL/L (ref 8–16)
AST SERPL-CCNC: 14 U/L (ref 10–40)
BASOPHILS # BLD AUTO: 0 K/UL (ref 0–0.2)
BASOPHILS NFR BLD: 0 % (ref 0–1.9)
BILIRUB SERPL-MCNC: 0.8 MG/DL (ref 0.1–1)
BUN SERPL-MCNC: 14 MG/DL (ref 6–20)
CALCIUM SERPL-MCNC: 9.6 MG/DL (ref 8.7–10.5)
CHLORIDE SERPL-SCNC: 107 MMOL/L (ref 95–110)
CO2 SERPL-SCNC: 24 MMOL/L (ref 23–29)
CREAT SERPL-MCNC: 0.9 MG/DL (ref 0.5–1.4)
DIFFERENTIAL METHOD BLD: ABNORMAL
EOSINOPHIL # BLD AUTO: 0 K/UL (ref 0–0.5)
EOSINOPHIL NFR BLD: 0.6 % (ref 0–8)
ERYTHROCYTE [DISTWIDTH] IN BLOOD BY AUTOMATED COUNT: 12.6 % (ref 11.5–14.5)
EST. GFR  (NO RACE VARIABLE): >60 ML/MIN/1.73 M^2
GLUCOSE SERPL-MCNC: 94 MG/DL (ref 70–110)
HCT VFR BLD AUTO: 39.6 % (ref 40–54)
HGB BLD-MCNC: 13.6 G/DL (ref 14–18)
IMM GRANULOCYTES # BLD AUTO: 0.01 K/UL (ref 0–0.04)
IMM GRANULOCYTES NFR BLD AUTO: 0.3 % (ref 0–0.5)
LYMPHOCYTES # BLD AUTO: 0.8 K/UL (ref 1–4.8)
LYMPHOCYTES NFR BLD: 26.5 % (ref 18–48)
MCH RBC QN AUTO: 33.2 PG (ref 27–31)
MCHC RBC AUTO-ENTMCNC: 34.3 G/DL (ref 32–36)
MCV RBC AUTO: 97 FL (ref 82–98)
MONOCYTES # BLD AUTO: 0.4 K/UL (ref 0.3–1)
MONOCYTES NFR BLD: 12 % (ref 4–15)
NEUTROPHILS # BLD AUTO: 1.9 K/UL (ref 1.8–7.7)
NEUTROPHILS NFR BLD: 60.6 % (ref 38–73)
NRBC BLD-RTO: 0 /100 WBC
PLATELET # BLD AUTO: 117 K/UL (ref 150–450)
PMV BLD AUTO: 9.4 FL (ref 9.2–12.9)
POTASSIUM SERPL-SCNC: 4.2 MMOL/L (ref 3.5–5.1)
PROT SERPL-MCNC: 7.1 G/DL (ref 6–8.4)
RBC # BLD AUTO: 4.1 M/UL (ref 4.6–6.2)
SODIUM SERPL-SCNC: 141 MMOL/L (ref 136–145)
WBC # BLD AUTO: 3.09 K/UL (ref 3.9–12.7)

## 2024-08-09 PROCEDURE — 99999 PR PBB SHADOW E&M-EST. PATIENT-LVL III: CPT | Mod: PBBFAC,,, | Performed by: PSYCHIATRY & NEUROLOGY

## 2024-08-09 PROCEDURE — 85025 COMPLETE CBC W/AUTO DIFF WBC: CPT | Performed by: PSYCHIATRY & NEUROLOGY

## 2024-08-09 PROCEDURE — 80053 COMPREHEN METABOLIC PANEL: CPT | Performed by: PSYCHIATRY & NEUROLOGY

## 2024-08-09 PROCEDURE — 36415 COLL VENOUS BLD VENIPUNCTURE: CPT | Performed by: PSYCHIATRY & NEUROLOGY

## 2024-08-09 RX ORDER — TEMOZOLOMIDE 5 MG/1
CAPSULE ORAL
Qty: 5 CAPSULE | Refills: 0 | Status: SHIPPED | OUTPATIENT
Start: 2024-08-09

## 2024-08-09 RX ORDER — TEMOZOLOMIDE 100 MG/1
CAPSULE ORAL
Qty: 20 CAPSULE | Refills: 0 | Status: SHIPPED | OUTPATIENT
Start: 2024-08-09

## 2024-08-14 ENCOUNTER — TELEPHONE (OUTPATIENT)
Dept: OPTOMETRY | Facility: CLINIC | Age: 44
End: 2024-08-14
Payer: COMMERCIAL

## 2024-08-16 ENCOUNTER — OFFICE VISIT (OUTPATIENT)
Dept: OPTOMETRY | Facility: CLINIC | Age: 44
End: 2024-08-16
Payer: COMMERCIAL

## 2024-08-16 DIAGNOSIS — H52.4 PRESBYOPIA OF BOTH EYES: ICD-10-CM

## 2024-08-16 DIAGNOSIS — C71.9 OLIGODENDROGLIOMA DETERMINED BY BIOPSY OF BRAIN: Primary | ICD-10-CM

## 2024-08-16 PROCEDURE — 99999 PR PBB SHADOW E&M-EST. PATIENT-LVL III: CPT | Mod: PBBFAC,,, | Performed by: OPTOMETRIST

## 2024-08-16 NOTE — PROGRESS NOTES
HPI    DLS: 04/11/2024 Delia Carter MD  Patient comes in today to establish ocular health care. Patient states he   is having trouble with near vision. Patient is using +2.00 OTC readers.   Patient denies any HAs. No flashes or floaters.  Gtts: ATs prn   Last edited by Arlette Leos, OD on 8/16/2024 10:41 AM.            Assessment /Plan     For exam results, see Encounter Report.    Oligodendroglioma determined by biopsy of brain    Presbyopia of both eyes      1. All ocular findings WNL OU today. Continue care with Dr. Carter as scheduled.    2.   Eyeglass Final Rx       Eyeglass Final Rx         Sphere Cylinder Axis Add    Right [place] +0.75 170 +1.50    Left [place] +0.50 015 +1.50      Type: PAL    Expiration Date: 8/16/2025                     RTC in 1 year for annual eye exam unless changes noted sooner.

## 2024-08-30 DIAGNOSIS — C71.9 OLIGODENDROGLIOMA DETERMINED BY BIOPSY OF BRAIN: ICD-10-CM

## 2024-09-17 ENCOUNTER — OFFICE VISIT (OUTPATIENT)
Dept: NEUROSURGERY | Facility: CLINIC | Age: 44
End: 2024-09-17
Payer: COMMERCIAL

## 2024-09-17 ENCOUNTER — HOSPITAL ENCOUNTER (OUTPATIENT)
Dept: RADIOLOGY | Facility: HOSPITAL | Age: 44
Discharge: HOME | End: 2024-09-17
Attending: PSYCHIATRY & NEUROLOGY
Payer: COMMERCIAL

## 2024-09-17 VITALS
WEIGHT: 168.88 LBS | BODY MASS INDEX: 22.38 KG/M2 | DIASTOLIC BLOOD PRESSURE: 66 MMHG | HEART RATE: 69 BPM | HEIGHT: 73 IN | SYSTOLIC BLOOD PRESSURE: 109 MMHG

## 2024-09-17 DIAGNOSIS — C71.9 OLIGODENDROGLIOMA DETERMINED BY BIOPSY OF BRAIN: ICD-10-CM

## 2024-09-17 DIAGNOSIS — C71.9 OLIGODENDROGLIOMA DETERMINED BY BIOPSY OF BRAIN: Primary | ICD-10-CM

## 2024-09-17 DIAGNOSIS — R56.9 SEIZURE: ICD-10-CM

## 2024-09-17 PROCEDURE — 1159F MED LIST DOCD IN RCRD: CPT | Mod: CPTII,S$GLB,, | Performed by: PSYCHIATRY & NEUROLOGY

## 2024-09-17 PROCEDURE — 70553 MRI BRAIN STEM W/O & W/DYE: CPT | Mod: TC

## 2024-09-17 PROCEDURE — 3074F SYST BP LT 130 MM HG: CPT | Mod: CPTII,S$GLB,, | Performed by: PSYCHIATRY & NEUROLOGY

## 2024-09-17 PROCEDURE — 3008F BODY MASS INDEX DOCD: CPT | Mod: CPTII,S$GLB,, | Performed by: PSYCHIATRY & NEUROLOGY

## 2024-09-17 PROCEDURE — G2211 COMPLEX E/M VISIT ADD ON: HCPCS | Mod: S$GLB,,, | Performed by: PSYCHIATRY & NEUROLOGY

## 2024-09-17 PROCEDURE — 99215 OFFICE O/P EST HI 40 MIN: CPT | Mod: S$GLB,,, | Performed by: PSYCHIATRY & NEUROLOGY

## 2024-09-17 PROCEDURE — 70553 MRI BRAIN STEM W/O & W/DYE: CPT | Mod: 26,,, | Performed by: RADIOLOGY

## 2024-09-17 PROCEDURE — A9585 GADOBUTROL INJECTION: HCPCS | Performed by: PSYCHIATRY & NEUROLOGY

## 2024-09-17 PROCEDURE — 3078F DIAST BP <80 MM HG: CPT | Mod: CPTII,S$GLB,, | Performed by: PSYCHIATRY & NEUROLOGY

## 2024-09-17 PROCEDURE — 99999 PR PBB SHADOW E&M-EST. PATIENT-LVL III: CPT | Mod: PBBFAC,,, | Performed by: PSYCHIATRY & NEUROLOGY

## 2024-09-17 PROCEDURE — 25500020 PHARM REV CODE 255: Performed by: PSYCHIATRY & NEUROLOGY

## 2024-09-17 RX ORDER — GADOBUTROL 604.72 MG/ML
8 INJECTION INTRAVENOUS
Status: COMPLETED | OUTPATIENT
Start: 2024-09-17 | End: 2024-09-17

## 2024-09-17 RX ORDER — TEMOZOLOMIDE 5 MG/1
CAPSULE ORAL
Qty: 5 CAPSULE | Refills: 0 | Status: SHIPPED | OUTPATIENT
Start: 2024-09-17

## 2024-09-17 RX ORDER — TEMOZOLOMIDE 100 MG/1
CAPSULE ORAL
Qty: 20 CAPSULE | Refills: 0 | Status: SHIPPED | OUTPATIENT
Start: 2024-09-17

## 2024-09-17 RX ADMIN — GADOBUTROL 8 ML: 604.72 INJECTION INTRAVENOUS at 07:09

## 2024-09-17 NOTE — PROGRESS NOTES
Subjective:       Patient ID: Carlos Jacobson is a 44 y.o. male.    Chief Complaint: olidendroglioma    Follow-up  Associated symptoms include headaches. Pertinent negatives include no abdominal pain, arthralgias, chest pain, coughing, fatigue, fever, nausea, numbness or weakness.     5/2023: presented to urgent care for evaluation of tunnel vision and left foot numbness. CT head showed a left frontal lesion. MRI brain confirmed diffuse, non-enhancing tumor within the temporal and parietal lobes involving the cingulate gyrus and crossing the midline via diffuse infiltration of the posterior corpus callosum  6/13/2023: biopsy of lesion (Michele Schuler) with pathology consistent with oligodendroglioma, WHO grade 2  7/27/2023: resection of lesion with Dr. 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 No new symptoms.     Past Medical History:   Diagnosis Date    Allergy     Anxiety     Brain tumor     Cancer     Clostridium difficile colitis 04/28/2014    GERD (gastroesophageal reflux disease)     Renal lesion       Current Outpatient Medications   Medication Sig Dispense Refill    ascorbic acid, vitamin C, (VITAMIN C) 1000 MG tablet Take 1,000 mg by mouth once daily.      Lactobacillus acidophilus (PROBIOTIC ORAL) Take 1 tablet by mouth once daily.      levETIRAcetam (KEPPRA) 750 MG Tab Take 1 tablet (750 mg total) by mouth 2 (two) times daily. 180 tablet 3    levocetirizine (XYZAL) 5 MG tablet Take 5 mg by mouth every evening.      memantine (NAMENDA) 10 MG Tab TAKE 1 TABLET (10 MG TOTAL) BY MOUTH 2 (TWO) TIMES DAILY. (Patient not taking:  Reported on 8/9/2024) 60 tablet 4    multivitamin (THERAGRAN) per tablet Take 1 tablet by mouth once daily.      ondansetron (ZOFRAN) 8 MG tablet Take 8mg (1 tab) 30-45 minutes prior to chemotherapy dose and every 8 hours as needed 30 tablet 3    temozolomide (TEMODAR) 100 MG capsule TAKE FOUR 100MG CAPSULES WITH  ONE 5MG CAPSULE (TOTAL 405MG) BY MOUTH DAILY FOR 5 DAYS OF 28 DAY CYCLE 20 capsule 0    temozolomide (TEMODAR) 5 MG capsule TAKE ONE 5MG CAPSULE WITH FOUR  100MG CAPSULES (TOTAL 405MG) BY  MOUTH DAILY FOR 5 DAYS OF 28 DAY CYCLE 5 capsule 0     No current facility-administered medications for this visit.      Past Surgical History:   Procedure Laterality Date    BILATERAL INGUINAL HERNIA REPAIR Bilateral 1987    COLONOSCOPY  2014    CRANIOTOMY Left 7/27/2023    Procedure: CRANIOTOMY;  Surgeon: Michele Schuler MD;  Location: [address];  Service: Neurosurgery;  Laterality: Left;  LEFT PARIETAL CRANIOTOMY WITH RESECTION OF BRAIN MASS WITH DR. MIMS    STEREOTACTIC BIOPSY OF BRAIN Left 6/13/2023    Procedure: BIOPSY, BRAIN, STEREOTACTIC - LEFT PARIETAL BRAIN BIOPSY;  Surgeon: Michele Schuler MD;  Location: [facility];  Service: Neurosurgery;  Laterality: Left;      Family History   Problem Relation Name Age of Onset    Hyperlipidemia Mother      Hyperlipidemia Father      No Known Problems Sister      Hyperlipidemia Brother      Hyperlipidemia Maternal Uncle      Diabetes Maternal Grandmother      Lung cancer Maternal Grandmother        Social History     Tobacco Use    Smoking status: Some Days     Types: Cigars    Smokeless tobacco: Never    Tobacco comments:     Cigar once a year   Substance Use Topics    Alcohol use: No     Comment: occasionally    Drug use: No      Review of Systems   Constitutional:  Negative for fatigue, fever and unexpected weight change.   HENT:  Negative for dental problem and drooling.    Eyes:  Positive for photophobia and visual disturbance.   Respiratory:  Negative for  cough and shortness of breath.    Cardiovascular:  Negative for chest pain and leg swelling.   Gastrointestinal:  Negative for abdominal pain and nausea.   Genitourinary:  Negative for difficulty urinating and dysuria.   Musculoskeletal:  Negative for arthralgias and back pain.   Neurological:  Positive for headaches. Negative for dizziness, seizures, weakness and numbness.   Psychiatric/Behavioral:  Negative for agitation and confusion.      KPS: 90      Objective:      Vitals:    09/17/24 0858   BP: 109/66   Pulse: 69           NEUROLOGICAL EXAMINATION:     MENTAL STATUS   Attention: normal. Concentration: normal.   Speech: speech is normal   Level of consciousness: alert    CRANIAL NERVES     CN II   Right visual field deficit: Right homonymous hemainopia.    CN III, IV, VI   Extraocular motions are normal.     CN V   Facial sensation intact.     CN VII   Facial expression full, symmetric.     CN VIII   CN VIII normal.     CN IX, X   CN IX normal.   CN X normal.     CN XI   CN XI normal.     CN XII   CN XII normal.     MOTOR EXAM     Strength   Strength 5/5 throughout.     SENSORY EXAM   Light touch normal.     GAIT AND COORDINATION     Gait  Gait: normal     Coordination   Finger to nose coordination: normal    MRI brain from 9/17/2024 was personally visualized and compared to prior. Per my read, there is no evidence of tumor progression.      Assessment:       Problem List Items Addressed This Visit          Neuro    Seizure       Oncology    Oligodendroglioma determined by biopsy of brain - Primary               Plan:       Carlos Jacobson is a 44 y.o. male with an oligodendroglioma, WHO grade 2 s/p subtotal resection, chemoradiation, and ongoing adjuvant TMZ presenting for follow up. Tumor diagnosis was heralded by left foot numbness and tunnel vision. Current symptoms include visual changes.    Oligodendroglioma  Undergoing adjuvant TMZ. Most recent MRI brain is stable. Labs are adequate for continuation of  adjuvant TMZ C10 @200mg/m2. F/u in 4 weeks with CBC/CMP in anticipation of C11 TMZ. Next MRI brain due 12/2024.    Seizure  Had episodic aphasia for 30 seconds at a time with quick return to baseline consistent with focal seizure. Resolved once AED started. Continue levetiracetam 750mg bid.                     Candice Mabry MD  Department of Neurology  Neuro-oncology, Movement Disorders

## 2024-09-19 ENCOUNTER — PATIENT MESSAGE (OUTPATIENT)
Dept: PRIMARY CARE CLINIC | Facility: CLINIC | Age: 44
End: 2024-09-19
Payer: COMMERCIAL

## 2024-09-19 ENCOUNTER — PATIENT MESSAGE (OUTPATIENT)
Dept: NEUROSURGERY | Facility: CLINIC | Age: 44
End: 2024-09-19
Payer: COMMERCIAL

## 2024-09-19 DIAGNOSIS — C71.9 OLIGODENDROGLIOMA DETERMINED BY BIOPSY OF BRAIN: ICD-10-CM

## 2024-09-23 ENCOUNTER — PATIENT MESSAGE (OUTPATIENT)
Dept: NEUROLOGY | Facility: CLINIC | Age: 44
End: 2024-09-23
Payer: COMMERCIAL

## 2024-09-23 ENCOUNTER — TELEPHONE (OUTPATIENT)
Dept: NEUROLOGY | Facility: CLINIC | Age: 44
End: 2024-09-23
Payer: COMMERCIAL

## 2024-09-23 NOTE — TELEPHONE ENCOUNTER
Called patient to inform him that we are working on his prior authorization for his chemotherapy medication.

## 2024-09-25 ENCOUNTER — PATIENT MESSAGE (OUTPATIENT)
Dept: NEUROSURGERY | Facility: CLINIC | Age: 44
End: 2024-09-25
Payer: COMMERCIAL

## 2024-09-25 RX ORDER — TEMOZOLOMIDE 100 MG/1
CAPSULE ORAL
Qty: 20 CAPSULE | Refills: 0 | Status: SHIPPED | OUTPATIENT
Start: 2024-09-25

## 2024-09-25 RX ORDER — TEMOZOLOMIDE 5 MG/1
CAPSULE ORAL
Qty: 5 CAPSULE | Refills: 0 | Status: SHIPPED | OUTPATIENT
Start: 2024-09-25

## 2024-09-27 ENCOUNTER — PATIENT MESSAGE (OUTPATIENT)
Dept: NEUROSURGERY | Facility: CLINIC | Age: 44
End: 2024-09-27
Payer: COMMERCIAL

## 2024-09-27 DIAGNOSIS — R56.9 SEIZURE: ICD-10-CM

## 2024-09-27 DIAGNOSIS — C71.9 OLIGODENDROGLIOMA DETERMINED BY BIOPSY OF BRAIN: Primary | ICD-10-CM

## 2024-09-27 RX ORDER — TEMOZOLOMIDE 100 MG/1
400 CAPSULE ORAL DAILY
Qty: 20 CAPSULE | Refills: 0 | Status: SHIPPED | OUTPATIENT
Start: 2024-09-27 | End: 2024-10-02

## 2024-09-27 RX ORDER — TEMOZOLOMIDE 5 MG/1
5 CAPSULE ORAL DAILY
Qty: 5 CAPSULE | Refills: 0 | Status: SHIPPED | OUTPATIENT
Start: 2024-09-27 | End: 2024-10-02

## 2024-09-30 ENCOUNTER — PATIENT MESSAGE (OUTPATIENT)
Dept: NEUROLOGY | Facility: CLINIC | Age: 44
End: 2024-09-30
Payer: COMMERCIAL

## 2024-09-30 RX ORDER — LEVETIRACETAM 750 MG/1
750 TABLET ORAL 2 TIMES DAILY
Qty: 180 TABLET | Refills: 3 | Status: SHIPPED | OUTPATIENT
Start: 2024-09-30

## 2024-10-02 ENCOUNTER — PATIENT MESSAGE (OUTPATIENT)
Dept: NEUROSURGERY | Facility: CLINIC | Age: 44
End: 2024-10-02
Payer: COMMERCIAL

## 2024-10-18 DIAGNOSIS — C71.9 OLIGODENDROGLIOMA DETERMINED BY BIOPSY OF BRAIN: ICD-10-CM

## 2024-10-24 ENCOUNTER — TELEPHONE (OUTPATIENT)
Dept: NEUROLOGY | Facility: CLINIC | Age: 44
End: 2024-10-24
Payer: COMMERCIAL

## 2024-10-24 NOTE — TELEPHONE ENCOUNTER
Returned call from [facility]  to inform them that Dr. Mabry likes to have labs and follow up appointment before prescribing the next dose of chemo.  Patient is scheduled for labs and follow up appointment next Tuesday 10/29 which they can expect a prescription renewal.

## 2024-10-29 ENCOUNTER — LAB VISIT (OUTPATIENT)
Dept: LAB | Facility: HOSPITAL | Age: 44
End: 2024-10-29
Attending: PSYCHIATRY & NEUROLOGY
Payer: COMMERCIAL

## 2024-10-29 ENCOUNTER — OFFICE VISIT (OUTPATIENT)
Dept: NEUROSURGERY | Facility: CLINIC | Age: 44
End: 2024-10-29
Payer: COMMERCIAL

## 2024-10-29 VITALS
DIASTOLIC BLOOD PRESSURE: 66 MMHG | HEART RATE: 73 BPM | WEIGHT: 168.88 LBS | BODY MASS INDEX: 22.28 KG/M2 | SYSTOLIC BLOOD PRESSURE: 117 MMHG

## 2024-10-29 DIAGNOSIS — R56.9 SEIZURE: ICD-10-CM

## 2024-10-29 DIAGNOSIS — C71.9 OLIGODENDROGLIOMA DETERMINED BY BIOPSY OF BRAIN: Primary | ICD-10-CM

## 2024-10-29 DIAGNOSIS — C71.9 OLIGODENDROGLIOMA DETERMINED BY BIOPSY OF BRAIN: ICD-10-CM

## 2024-10-29 LAB
ALBUMIN SERPL BCP-MCNC: 4.1 G/DL (ref 3.5–5.2)
ALP SERPL-CCNC: 68 U/L (ref 40–150)
ALT SERPL W/O P-5'-P-CCNC: 16 U/L (ref 10–44)
ANION GAP SERPL CALC-SCNC: 7 MMOL/L (ref 8–16)
AST SERPL-CCNC: 15 U/L (ref 10–40)
BASOPHILS # BLD AUTO: 0.01 K/UL (ref 0–0.2)
BASOPHILS NFR BLD: 0.2 % (ref 0–1.9)
BILIRUB SERPL-MCNC: 0.5 MG/DL (ref 0.1–1)
BUN SERPL-MCNC: 15 MG/DL (ref 6–20)
CALCIUM SERPL-MCNC: 9.3 MG/DL (ref 8.7–10.5)
CHLORIDE SERPL-SCNC: 107 MMOL/L (ref 95–110)
CO2 SERPL-SCNC: 27 MMOL/L (ref 23–29)
CREAT SERPL-MCNC: 0.9 MG/DL (ref 0.5–1.4)
DIFFERENTIAL METHOD BLD: ABNORMAL
EOSINOPHIL # BLD AUTO: 0 K/UL (ref 0–0.5)
EOSINOPHIL NFR BLD: 0.7 % (ref 0–8)
ERYTHROCYTE [DISTWIDTH] IN BLOOD BY AUTOMATED COUNT: 12.1 % (ref 11.5–14.5)
EST. GFR  (NO RACE VARIABLE): >60 ML/MIN/1.73 M^2
GLUCOSE SERPL-MCNC: 129 MG/DL (ref 70–110)
HCT VFR BLD AUTO: 41.2 % (ref 40–54)
HGB BLD-MCNC: 14.1 G/DL (ref 14–18)
IMM GRANULOCYTES # BLD AUTO: 0.02 K/UL (ref 0–0.04)
IMM GRANULOCYTES NFR BLD AUTO: 0.5 % (ref 0–0.5)
LYMPHOCYTES # BLD AUTO: 1.1 K/UL (ref 1–4.8)
LYMPHOCYTES NFR BLD: 27.6 % (ref 18–48)
MCH RBC QN AUTO: 32.6 PG (ref 27–31)
MCHC RBC AUTO-ENTMCNC: 34.2 G/DL (ref 32–36)
MCV RBC AUTO: 95 FL (ref 82–98)
MONOCYTES # BLD AUTO: 0.3 K/UL (ref 0.3–1)
MONOCYTES NFR BLD: 7.6 % (ref 4–15)
NEUTROPHILS # BLD AUTO: 2.6 K/UL (ref 1.8–7.7)
NEUTROPHILS NFR BLD: 63.4 % (ref 38–73)
NRBC BLD-RTO: 0 /100 WBC
PLATELET # BLD AUTO: 128 K/UL (ref 150–450)
PMV BLD AUTO: 9.7 FL (ref 9.2–12.9)
POTASSIUM SERPL-SCNC: 3.9 MMOL/L (ref 3.5–5.1)
PROT SERPL-MCNC: 7.1 G/DL (ref 6–8.4)
RBC # BLD AUTO: 4.33 M/UL (ref 4.6–6.2)
SODIUM SERPL-SCNC: 141 MMOL/L (ref 136–145)
WBC # BLD AUTO: 4.1 K/UL (ref 3.9–12.7)

## 2024-10-29 PROCEDURE — 80053 COMPREHEN METABOLIC PANEL: CPT | Performed by: PSYCHIATRY & NEUROLOGY

## 2024-10-29 PROCEDURE — 99215 OFFICE O/P EST HI 40 MIN: CPT | Mod: S$GLB,,, | Performed by: PSYCHIATRY & NEUROLOGY

## 2024-10-29 PROCEDURE — 85025 COMPLETE CBC W/AUTO DIFF WBC: CPT | Performed by: PSYCHIATRY & NEUROLOGY

## 2024-10-29 PROCEDURE — 3008F BODY MASS INDEX DOCD: CPT | Mod: CPTII,S$GLB,, | Performed by: PSYCHIATRY & NEUROLOGY

## 2024-10-29 PROCEDURE — 3078F DIAST BP <80 MM HG: CPT | Mod: CPTII,S$GLB,, | Performed by: PSYCHIATRY & NEUROLOGY

## 2024-10-29 PROCEDURE — 1159F MED LIST DOCD IN RCRD: CPT | Mod: CPTII,S$GLB,, | Performed by: PSYCHIATRY & NEUROLOGY

## 2024-10-29 PROCEDURE — 3074F SYST BP LT 130 MM HG: CPT | Mod: CPTII,S$GLB,, | Performed by: PSYCHIATRY & NEUROLOGY

## 2024-10-29 PROCEDURE — G2211 COMPLEX E/M VISIT ADD ON: HCPCS | Mod: S$GLB,,, | Performed by: PSYCHIATRY & NEUROLOGY

## 2024-10-29 PROCEDURE — 36415 COLL VENOUS BLD VENIPUNCTURE: CPT | Performed by: PSYCHIATRY & NEUROLOGY

## 2024-10-29 PROCEDURE — 99999 PR PBB SHADOW E&M-EST. PATIENT-LVL III: CPT | Mod: PBBFAC,,, | Performed by: PSYCHIATRY & NEUROLOGY

## 2024-10-29 RX ORDER — TEMOZOLOMIDE 5 MG/1
CAPSULE ORAL
Qty: 5 CAPSULE | Refills: 0 | Status: SHIPPED | OUTPATIENT
Start: 2024-10-29

## 2024-10-29 RX ORDER — ONDANSETRON HYDROCHLORIDE 8 MG/1
TABLET, FILM COATED ORAL
Qty: 30 TABLET | Refills: 3 | Status: SHIPPED | OUTPATIENT
Start: 2024-10-29 | End: 2024-10-31 | Stop reason: SDUPTHER

## 2024-10-29 RX ORDER — TEMOZOLOMIDE 100 MG/1
CAPSULE ORAL
Qty: 20 CAPSULE | Refills: 0 | Status: SHIPPED | OUTPATIENT
Start: 2024-10-29

## 2024-10-31 ENCOUNTER — PATIENT MESSAGE (OUTPATIENT)
Dept: NEUROSURGERY | Facility: CLINIC | Age: 44
End: 2024-10-31
Payer: COMMERCIAL

## 2024-10-31 DIAGNOSIS — C71.9 OLIGODENDROGLIOMA DETERMINED BY BIOPSY OF BRAIN: ICD-10-CM

## 2024-10-31 RX ORDER — ONDANSETRON HYDROCHLORIDE 8 MG/1
TABLET, FILM COATED ORAL
Qty: 30 TABLET | Refills: 3 | Status: SHIPPED | OUTPATIENT
Start: 2024-10-31

## 2024-11-20 DIAGNOSIS — C71.9 OLIGODENDROGLIOMA DETERMINED BY BIOPSY OF BRAIN: ICD-10-CM

## 2024-11-26 ENCOUNTER — TELEPHONE (OUTPATIENT)
Dept: NEUROLOGY | Facility: CLINIC | Age: 44
End: 2024-11-26
Payer: COMMERCIAL

## 2024-11-26 NOTE — TELEPHONE ENCOUNTER
Returned call from pharmacy, informed pharmacy that chemo will not be refilled until after patients labs and follow up appointment.

## 2024-12-05 ENCOUNTER — OFFICE VISIT (OUTPATIENT)
Dept: NEUROLOGY | Facility: CLINIC | Age: 44
End: 2024-12-05
Payer: COMMERCIAL

## 2024-12-05 ENCOUNTER — LAB VISIT (OUTPATIENT)
Dept: LAB | Facility: HOSPITAL | Age: 44
End: 2024-12-05
Attending: PSYCHIATRY & NEUROLOGY
Payer: COMMERCIAL

## 2024-12-05 VITALS
HEART RATE: 70 BPM | HEIGHT: 73 IN | DIASTOLIC BLOOD PRESSURE: 69 MMHG | WEIGHT: 166.56 LBS | BODY MASS INDEX: 22.07 KG/M2 | SYSTOLIC BLOOD PRESSURE: 114 MMHG

## 2024-12-05 DIAGNOSIS — R56.9 SEIZURE: ICD-10-CM

## 2024-12-05 DIAGNOSIS — C71.9 OLIGODENDROGLIOMA DETERMINED BY BIOPSY OF BRAIN: ICD-10-CM

## 2024-12-05 DIAGNOSIS — C71.9 OLIGODENDROGLIOMA DETERMINED BY BIOPSY OF BRAIN: Primary | ICD-10-CM

## 2024-12-05 LAB
ALBUMIN SERPL BCP-MCNC: 4.2 G/DL (ref 3.5–5.2)
ALP SERPL-CCNC: 63 U/L (ref 40–150)
ALT SERPL W/O P-5'-P-CCNC: 13 U/L (ref 10–44)
ANION GAP SERPL CALC-SCNC: 11 MMOL/L (ref 8–16)
AST SERPL-CCNC: 14 U/L (ref 10–40)
BASOPHILS # BLD AUTO: 0.01 K/UL (ref 0–0.2)
BASOPHILS NFR BLD: 0.2 % (ref 0–1.9)
BILIRUB SERPL-MCNC: 0.5 MG/DL (ref 0.1–1)
BUN SERPL-MCNC: 17 MG/DL (ref 6–20)
CALCIUM SERPL-MCNC: 9.5 MG/DL (ref 8.7–10.5)
CHLORIDE SERPL-SCNC: 106 MMOL/L (ref 95–110)
CO2 SERPL-SCNC: 24 MMOL/L (ref 23–29)
CREAT SERPL-MCNC: 0.9 MG/DL (ref 0.5–1.4)
DIFFERENTIAL METHOD BLD: ABNORMAL
EOSINOPHIL # BLD AUTO: 0 K/UL (ref 0–0.5)
EOSINOPHIL NFR BLD: 0.7 % (ref 0–8)
ERYTHROCYTE [DISTWIDTH] IN BLOOD BY AUTOMATED COUNT: 12.3 % (ref 11.5–14.5)
EST. GFR  (NO RACE VARIABLE): >60 ML/MIN/1.73 M^2
GLUCOSE SERPL-MCNC: 92 MG/DL (ref 70–110)
HCT VFR BLD AUTO: 40.1 % (ref 40–54)
HGB BLD-MCNC: 13.7 G/DL (ref 14–18)
IMM GRANULOCYTES # BLD AUTO: 0 K/UL (ref 0–0.04)
IMM GRANULOCYTES NFR BLD AUTO: 0 % (ref 0–0.5)
LYMPHOCYTES # BLD AUTO: 1 K/UL (ref 1–4.8)
LYMPHOCYTES NFR BLD: 24.4 % (ref 18–48)
MCH RBC QN AUTO: 32.8 PG (ref 27–31)
MCHC RBC AUTO-ENTMCNC: 34.2 G/DL (ref 32–36)
MCV RBC AUTO: 96 FL (ref 82–98)
MONOCYTES # BLD AUTO: 0.4 K/UL (ref 0.3–1)
MONOCYTES NFR BLD: 8.9 % (ref 4–15)
NEUTROPHILS # BLD AUTO: 2.8 K/UL (ref 1.8–7.7)
NEUTROPHILS NFR BLD: 65.8 % (ref 38–73)
NRBC BLD-RTO: 0 /100 WBC
PLATELET # BLD AUTO: 211 K/UL (ref 150–450)
PMV BLD AUTO: 9.5 FL (ref 9.2–12.9)
POTASSIUM SERPL-SCNC: 4.1 MMOL/L (ref 3.5–5.1)
PROT SERPL-MCNC: 7.4 G/DL (ref 6–8.4)
RBC # BLD AUTO: 4.18 M/UL (ref 4.6–6.2)
SODIUM SERPL-SCNC: 141 MMOL/L (ref 136–145)
WBC # BLD AUTO: 4.27 K/UL (ref 3.9–12.7)

## 2024-12-05 PROCEDURE — 99999 PR PBB SHADOW E&M-EST. PATIENT-LVL III: CPT | Mod: PBBFAC,,, | Performed by: PSYCHIATRY & NEUROLOGY

## 2024-12-05 PROCEDURE — 3074F SYST BP LT 130 MM HG: CPT | Mod: CPTII,S$GLB,, | Performed by: PSYCHIATRY & NEUROLOGY

## 2024-12-05 PROCEDURE — 1159F MED LIST DOCD IN RCRD: CPT | Mod: CPTII,S$GLB,, | Performed by: PSYCHIATRY & NEUROLOGY

## 2024-12-05 PROCEDURE — 36415 COLL VENOUS BLD VENIPUNCTURE: CPT | Performed by: PSYCHIATRY & NEUROLOGY

## 2024-12-05 PROCEDURE — 3008F BODY MASS INDEX DOCD: CPT | Mod: CPTII,S$GLB,, | Performed by: PSYCHIATRY & NEUROLOGY

## 2024-12-05 PROCEDURE — G2211 COMPLEX E/M VISIT ADD ON: HCPCS | Mod: S$GLB,,, | Performed by: PSYCHIATRY & NEUROLOGY

## 2024-12-05 PROCEDURE — 99215 OFFICE O/P EST HI 40 MIN: CPT | Mod: S$GLB,,, | Performed by: PSYCHIATRY & NEUROLOGY

## 2024-12-05 PROCEDURE — 85025 COMPLETE CBC W/AUTO DIFF WBC: CPT | Performed by: PSYCHIATRY & NEUROLOGY

## 2024-12-05 PROCEDURE — 3078F DIAST BP <80 MM HG: CPT | Mod: CPTII,S$GLB,, | Performed by: PSYCHIATRY & NEUROLOGY

## 2024-12-05 PROCEDURE — 80053 COMPREHEN METABOLIC PANEL: CPT | Performed by: PSYCHIATRY & NEUROLOGY

## 2024-12-05 RX ORDER — TEMOZOLOMIDE 5 MG/1
CAPSULE ORAL
Qty: 5 CAPSULE | Refills: 0 | Status: SHIPPED | OUTPATIENT
Start: 2024-12-05

## 2024-12-05 RX ORDER — TEMOZOLOMIDE 100 MG/1
CAPSULE ORAL
Qty: 20 CAPSULE | Refills: 0 | Status: SHIPPED | OUTPATIENT
Start: 2024-12-05

## 2024-12-05 NOTE — PROGRESS NOTES
Subjective:       Patient ID: Carlos Jacobson is a 44 y.o. male.    Chief Complaint: olidendroglioma    Follow-up  Associated symptoms include headaches. Pertinent negatives include no abdominal pain, arthralgias, chest pain, coughing, fatigue, fever, nausea, numbness or weakness.     5/2023: presented to urgent care for evaluation of tunnel vision and left foot numbness. CT head showed a left frontal lesion. MRI brain confirmed diffuse, non-enhancing tumor within the temporal and parietal lobes involving the cingulate gyrus and crossing the midline via diffuse infiltration of the posterior corpus callosum  6/13/2023: biopsy of lesion (Michele Schuler) with pathology consistent with oligodendroglioma, WHO grade 2  7/27/2023: resection of lesion with Dr. 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 No new symptoms.     Past Medical History:   Diagnosis Date    Allergy     Anxiety     Brain tumor     Cancer     Clostridium difficile colitis 04/28/2014    GERD (gastroesophageal reflux disease)     Renal lesion       Current Outpatient Medications   Medication Sig Dispense Refill    ascorbic acid, vitamin C, (VITAMIN C) 1000 MG tablet Take 1,000 mg by mouth once daily.      Lactobacillus acidophilus (PROBIOTIC ORAL) Take 1 tablet by mouth once daily.      levETIRAcetam (KEPPRA) 750 MG Tab Take 1 tablet (750 mg total) by mouth 2 (two) times daily. 180 tablet 3    levocetirizine (XYZAL) 5 MG tablet Take 5 mg by mouth every evening.      multivitamin (THERAGRAN) per tablet Take 1 tablet by  mouth once daily.      ondansetron (ZOFRAN) 8 MG tablet Take 8mg (1 tab) 30-45 minutes prior to chemotherapy dose and every 8 hours as needed 30 tablet 3    temozolomide (TEMODAR) 100 MG capsule TAKE FOUR 100MG CAPSULES WITH  ONE 5MG CAPSULE (TOTAL 405MG) BY MOUTH DAILY FOR 5 DAYS OF 28 DAY CYCLE 20 capsule 0    temozolomide (TEMODAR) 5 MG capsule TAKE ONE 5MG CAPSULE WITH FOUR  100MG CAPSULES (TOTAL 405MG) BY  MOUTH DAILY FOR 5 DAYS OF 28 DAY CYCLE 5 capsule 0     No current facility-administered medications for this visit.      Past Surgical History:   Procedure Laterality Date    BILATERAL INGUINAL HERNIA REPAIR Bilateral 1987    COLONOSCOPY  2014    CRANIOTOMY Left 7/27/2023    Procedure: CRANIOTOMY;  Surgeon: Michele Schuler MD;  Location: [facility] OR [address] [place] [address];  Service: Neurosurgery;  Laterality: Left;  LEFT PARIETAL CRANIOTOMY WITH RESECTION OF BRAIN MASS WITH DR. MIMS    STEREOTACTIC BIOPSY OF BRAIN Left 6/13/2023    Procedure: BIOPSY, BRAIN, STEREOTACTIC - LEFT PARIETAL BRAIN BIOPSY;  Surgeon: Michele Schuler MD;  Location: [facility];  Service: Neurosurgery;  Laterality: Left;      Family History   Problem Relation Name Age of Onset    Hyperlipidemia Mother      Hyperlipidemia Father      No Known Problems Sister      Hyperlipidemia Brother      Hyperlipidemia Maternal Uncle      Diabetes Maternal Grandmother      Lung cancer Maternal Grandmother        Social History     Tobacco Use    Smoking status: Some Days     Types: Cigars    Smokeless tobacco: Never    Tobacco comments:     Cigar once a year   Substance Use Topics    Alcohol use: No     Comment: occasionally    Drug use: No      Review of Systems   Constitutional:  Negative for fatigue, fever and unexpected weight change.   HENT:  Negative for dental problem and drooling.    Eyes:  Positive for photophobia and visual disturbance.   Respiratory:  Negative for cough and shortness of breath.    Cardiovascular:  Negative for chest pain and leg swelling.    Gastrointestinal:  Negative for abdominal pain and nausea.   Genitourinary:  Negative for difficulty urinating and dysuria.   Musculoskeletal:  Negative for arthralgias and back pain.   Neurological:  Positive for headaches. Negative for dizziness, seizures, weakness and numbness.   Psychiatric/Behavioral:  Negative for agitation and confusion.      KPS: 90      Objective:      Vitals:    12/05/24 1337   BP: 114/69   Pulse: 70         (Prior)  NEUROLOGICAL EXAMINATION:     MENTAL STATUS   Attention: normal. Concentration: normal.   Speech: speech is normal   Level of consciousness: alert    CRANIAL NERVES     CN II   Right visual field deficit: Right homonymous hemainopia.    CN III, IV, VI   Extraocular motions are normal.     CN V   Facial sensation intact.     CN VII   Facial expression full, symmetric.     CN VIII   CN VIII normal.     CN IX, X   CN IX normal.   CN X normal.     CN XI   CN XI normal.     CN XII   CN XII normal.     MOTOR EXAM     Strength   Strength 5/5 throughout.     SENSORY EXAM   Light touch normal.     GAIT AND COORDINATION     Gait  Gait: normal     Coordination   Finger to nose coordination: normal    MRI brain from 9/17/2024 was personally visualized and compared to prior. Per my read, there is no evidence of tumor progression.      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 male with an oligodendroglioma, WHO grade 2 s/p subtotal resection, chemoradiation, and ongoing adjuvant TMZ presenting for follow up. Tumor diagnosis was heralded by left foot numbness and tunnel vision. Current symptoms include visual changes.    Oligodendroglioma  Undergoing adjuvant TMZ. Labs are adequate for continuation of adjuvant TMZ C12 @200mg/m2. F/u in 4 weeks with  CBC/CMP and MRI brain for end of chemo follow up.    Seizure  Had episodic aphasia for 30 seconds at a time with quick return to baseline consistent with focal seizure. Resolved once AED started. Continue levetiracetam 750mg bid.                     Candice Mabry MD  Department of Neurology  Neuro-oncology Section

## 2025-01-15 ENCOUNTER — TELEPHONE (OUTPATIENT)
Dept: NEUROSURGERY | Facility: CLINIC | Age: 45
End: 2025-01-15
Payer: COMMERCIAL

## 2025-01-15 ENCOUNTER — HOSPITAL ENCOUNTER (OUTPATIENT)
Dept: RADIOLOGY | Facility: HOSPITAL | Age: 45
Discharge: HOME | End: 2025-01-15
Attending: PSYCHIATRY & NEUROLOGY
Payer: COMMERCIAL

## 2025-01-15 ENCOUNTER — PATIENT MESSAGE (OUTPATIENT)
Dept: NEUROSURGERY | Facility: CLINIC | Age: 45
End: 2025-01-15
Payer: COMMERCIAL

## 2025-01-15 ENCOUNTER — OFFICE VISIT (OUTPATIENT)
Dept: NEUROSURGERY | Facility: CLINIC | Age: 45
End: 2025-01-15
Payer: COMMERCIAL

## 2025-01-15 ENCOUNTER — OFFICE VISIT (OUTPATIENT)
Facility: CLINIC | Age: 45
End: 2025-01-15
Payer: COMMERCIAL

## 2025-01-15 ENCOUNTER — PATIENT MESSAGE (OUTPATIENT)
Dept: NEUROSURGERY | Facility: CLINIC | Age: 45
End: 2025-01-15

## 2025-01-15 VITALS
SYSTOLIC BLOOD PRESSURE: 126 MMHG | HEIGHT: 73 IN | DIASTOLIC BLOOD PRESSURE: 83 MMHG | WEIGHT: 166.44 LBS | HEART RATE: 81 BPM | RESPIRATION RATE: 18 BRPM | BODY MASS INDEX: 22.06 KG/M2

## 2025-01-15 VITALS — WEIGHT: 166.44 LBS | BODY MASS INDEX: 22.06 KG/M2 | HEIGHT: 73 IN

## 2025-01-15 DIAGNOSIS — C71.9 OLIGODENDROGLIOMA DETERMINED BY BIOPSY OF BRAIN: ICD-10-CM

## 2025-01-15 DIAGNOSIS — D49.6 BRAIN TUMOR: Primary | ICD-10-CM

## 2025-01-15 DIAGNOSIS — R56.9 SEIZURE: ICD-10-CM

## 2025-01-15 DIAGNOSIS — C71.9 OLIGODENDROGLIOMA DETERMINED BY BIOPSY OF BRAIN: Primary | ICD-10-CM

## 2025-01-15 PROCEDURE — 25500020 PHARM REV CODE 255: Performed by: PSYCHIATRY & NEUROLOGY

## 2025-01-15 PROCEDURE — 99215 OFFICE O/P EST HI 40 MIN: CPT | Mod: S$GLB,,, | Performed by: PSYCHIATRY & NEUROLOGY

## 2025-01-15 PROCEDURE — 99999 PR PBB SHADOW E&M-EST. PATIENT-LVL III: CPT | Mod: PBBFAC,,, | Performed by: PSYCHIATRY & NEUROLOGY

## 2025-01-15 PROCEDURE — 3008F BODY MASS INDEX DOCD: CPT | Mod: CPTII,S$GLB,, | Performed by: NEUROLOGICAL SURGERY

## 2025-01-15 PROCEDURE — 70553 MRI BRAIN STEM W/O & W/DYE: CPT | Mod: TC

## 2025-01-15 PROCEDURE — 1159F MED LIST DOCD IN RCRD: CPT | Mod: CPTII,S$GLB,, | Performed by: NEUROLOGICAL SURGERY

## 2025-01-15 PROCEDURE — 3008F BODY MASS INDEX DOCD: CPT | Mod: CPTII,S$GLB,, | Performed by: PSYCHIATRY & NEUROLOGY

## 2025-01-15 PROCEDURE — 70553 MRI BRAIN STEM W/O & W/DYE: CPT | Mod: 26,,, | Performed by: RADIOLOGY

## 2025-01-15 PROCEDURE — 3074F SYST BP LT 130 MM HG: CPT | Mod: CPTII,S$GLB,, | Performed by: NEUROLOGICAL SURGERY

## 2025-01-15 PROCEDURE — A9585 GADOBUTROL INJECTION: HCPCS | Performed by: PSYCHIATRY & NEUROLOGY

## 2025-01-15 PROCEDURE — 99215 OFFICE O/P EST HI 40 MIN: CPT | Mod: S$GLB,,, | Performed by: NEUROLOGICAL SURGERY

## 2025-01-15 PROCEDURE — G2211 COMPLEX E/M VISIT ADD ON: HCPCS | Mod: S$GLB,,, | Performed by: PSYCHIATRY & NEUROLOGY

## 2025-01-15 PROCEDURE — 3079F DIAST BP 80-89 MM HG: CPT | Mod: CPTII,S$GLB,, | Performed by: NEUROLOGICAL SURGERY

## 2025-01-15 PROCEDURE — 1159F MED LIST DOCD IN RCRD: CPT | Mod: CPTII,S$GLB,, | Performed by: PSYCHIATRY & NEUROLOGY

## 2025-01-15 RX ORDER — GADOBUTROL 604.72 MG/ML
10 INJECTION INTRAVENOUS
Status: COMPLETED | OUTPATIENT
Start: 2025-01-15 | End: 2025-01-15

## 2025-01-15 RX ADMIN — GADOBUTROL 10 ML: 604.72 INJECTION INTRAVENOUS at 09:01

## 2025-01-15 NOTE — PROGRESS NOTES
Subjective:       Patient ID: Carlos Jacobson is a 44 y.o. male.    Chief Complaint: olidendroglioma    Follow-up  Associated symptoms include headaches. Pertinent negatives include no abdominal pain, arthralgias, chest pain, coughing, fatigue, fever, nausea, numbness or weakness.     5/2023: presented to urgent care for evaluation of tunnel vision and left foot numbness. CT head showed a left frontal lesion. MRI brain confirmed diffuse, non-enhancing tumor within the temporal and parietal lobes involving the cingulate gyrus and crossing the midline via diffuse infiltration of the posterior corpus callosum  6/13/2023: biopsy of lesion (Michele Schuler) with pathology consistent with oligodendroglioma, WHO grade 2  7/27/2023: resection of lesion with Dr. 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     Past Medical History:   Diagnosis Date    Allergy     Anxiety     Brain tumor     Cancer     Clostridium difficile colitis 04/28/2014    GERD (gastroesophageal reflux disease)     Renal lesion       Current Outpatient Medications   Medication Sig Dispense Refill    ascorbic acid, vitamin C, (VITAMIN C) 1000 MG tablet Take 1,000 mg by mouth once daily.      Lactobacillus acidophilus (PROBIOTIC ORAL) Take 1 tablet by mouth once daily.      levETIRAcetam (KEPPRA) 750 MG Tab Take 1 tablet (750 mg total) by mouth 2 (two) times daily. 180 tablet 3    levocetirizine (XYZAL) 5 MG tablet Take 5 mg by mouth every evening.      multivitamin (THERAGRAN)  per tablet Take 1 tablet by mouth once daily.      ondansetron (ZOFRAN) 8 MG tablet Take 8mg (1 tab) 30-45 minutes prior to chemotherapy dose and every 8 hours as needed 30 tablet 3    temozolomide (TEMODAR) 100 MG capsule TAKE FOUR 100MG CAPSULES WITH  ONE 5MG CAPSULE (TOTAL 405MG) BY MOUTH DAILY FOR 5 DAYS OF 28 DAY CYCLE 20 capsule 0    temozolomide (TEMODAR) 5 MG capsule TAKE ONE 5MG CAPSULE WITH FOUR  100MG CAPSULES (TOTAL 405MG) BY  MOUTH DAILY FOR 5 DAYS OF 28 DAY CYCLE 5 capsule 0     No current facility-administered medications for this visit.      Past Surgical History:   Procedure Laterality Date    BILATERAL INGUINAL HERNIA REPAIR Bilateral 1987    COLONOSCOPY  2014    CRANIOTOMY Left 7/27/2023    Procedure: CRANIOTOMY;  Surgeon: Michele Schuler MD;  Location: [address];  Service: Neurosurgery;  Laterality: Left;  LEFT PARIETAL CRANIOTOMY WITH RESECTION OF BRAIN MASS WITH DR. MIMS    STEREOTACTIC BIOPSY OF BRAIN Left 6/13/2023    Procedure: BIOPSY, BRAIN, STEREOTACTIC - LEFT PARIETAL BRAIN BIOPSY;  Surgeon: Michele Schuler MD;  Location: [facility];  Service: Neurosurgery;  Laterality: Left;      Family History   Problem Relation Name Age of Onset    Hyperlipidemia Mother      Hyperlipidemia Father      No Known Problems Sister      Hyperlipidemia Brother      Hyperlipidemia Maternal Uncle      Diabetes Maternal Grandmother      Lung cancer Maternal Grandmother        Social History     Tobacco Use    Smoking status: Some Days     Types: Cigars    Smokeless tobacco: Never    Tobacco comments:     Cigar once a year   Substance Use Topics    Alcohol use: No     Comment: occasionally    Drug use: No      Review of Systems   Constitutional:  Negative for fatigue, fever and unexpected weight change.   HENT:  Negative for dental problem and drooling.    Eyes:  Positive for photophobia and visual disturbance.   Respiratory:  Negative for cough and shortness of breath.    Cardiovascular:  Negative for  chest pain and leg swelling.   Gastrointestinal:  Negative for abdominal pain and nausea.   Genitourinary:  Negative for difficulty urinating and dysuria.   Musculoskeletal:  Negative for arthralgias and back pain.   Neurological:  Positive for headaches. Negative for dizziness, seizures, weakness and numbness.   Psychiatric/Behavioral:  Negative for agitation and confusion.      KPS: 90      Objective:      There were no vitals filed for this visit.    NEUROLOGICAL EXAMINATION:     MENTAL STATUS   Attention: normal. Concentration: normal.   Speech: speech is normal   Level of consciousness: alert    CRANIAL NERVES     CN II   Right visual field deficit: Right homonymous hemainopia.    CN III, IV, VI   Extraocular motions are normal.     CN V   Facial sensation intact.     CN VII   Facial expression full, symmetric.     CN VIII   CN VIII normal.     CN IX, X   CN IX normal.   CN X normal.     CN XI   CN XI normal.     CN XII   CN XII normal.     MOTOR EXAM     Strength   Strength 5/5 throughout.     SENSORY EXAM   Light touch normal.     GAIT AND COORDINATION     Gait  Gait: normal     Coordination   Finger to nose coordination: normal    MRI brain from 1/15/2025 was personally visualized and compared to prior. Per my read, there is no evidence of tumor progression.      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 male with an oligodendroglioma, WHO grade 2 s/p subtotal resection, chemoradiation, and adjuvant TMZ completed 12/2024 presenting for follow up. Tumor diagnosis was heralded by left foot numbness and tunnel vision. Current symptoms include mild visual changes.    Oligodendroglioma  End of chemotherapy laboratory evaluation is unremarkable. MRI brain from earlier today  is stable. F/u in 3 months with surveillance MRI brain.    Seizure  Had episodic aphasia for 30 seconds at a time with quick return to baseline consistent with focal seizure. Resolved once AED started. Continue levetiracetam 750mg bid.                     Candice Mabry MD  Department of Neurology  Neuro-oncology Section

## 2025-01-15 NOTE — PROGRESS NOTES
Neurosurgery History & Physical    Patient ID: Carlos Jacobson is a 44 y.o. male.    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    At last visit, imaging was reviewed which was stable.  Plan was for him to finish his Temodar treatment by November 20, 2024.    Since his last visit, he reports that he has been doing very well with no new neurologic issues.  He has returned to work and is driving without issue.      Interval HPI 07/16/2024:  He has been undergoing monthly TMZ cycles since 12/2023.  He should begin his monthly round tonight if it arrives in the mail today.  He reports that TMZ cycles are scheduled to end in November 2024 but is unsure of the plan beyond that.  He had radiation from 9/6/2023 - 10/17/2023.     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  He reports increased fatigue by the third day of his TMZ cycle.  He denies neurologic symptoms.     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  He likes to garden which he uses as therapy.       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  He has been doing well overall.  He continues to have a right sided visual field cut.       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  Patient returns today for a general postoperative visit, review of pathology, and discussion of next steps.  The patient denies any issues with his wound.  He did see Dr. Segura this morning at the request of Dr. Mabry with Neuro-Oncology.     History of Present Illness 06/13/2023: Mr. Jacobson is a 42 year old male who presents today  for evaluation of recent brain imaging. Over the last 6 months, he reports pressure in his head and tinnitus at times. He has associated these symptoms with anxiety which he has struggled with over the last few years. He reports two episodes of left foot numbness and tunnel vision in recent weeks which lead him to seek evaluation at the urgent care. A CT was performed there which showed a brain lesion and PCP ordered MRI brain and CT C/A/P.      He feels that he has trouble with coordination when walking down stairs. Otherwise denies other neurologic symptom.      He has seen Urology regarding CT CAP which demonstrates small left renal lesion. They recommended follow up in 3 months for monitoring.     Review of Systems   Constitutional:  Negative for activity change and fever.   Eyes:  Negative for visual disturbance.   Respiratory:  Negative for shortness of breath.    Cardiovascular:  Negative for chest pain.   Gastrointestinal:  Negative for nausea and vomiting.   Endocrine: Negative for cold intolerance, heat intolerance, polydipsia, polyphagia and polyuria.   Genitourinary:  Negative for decreased urine volume, difficulty urinating and frequency.   Musculoskeletal:  Negative for back pain, gait problem and neck pain.   Neurological:  Negative for dizziness, tremors, seizures, syncope, facial asymmetry, speech difficulty, weakness, light-headedness, numbness and headaches.   Psychiatric/Behavioral:  Negative for confusion.        Past Medical History:   Diagnosis Date    Allergy     Anxiety     Brain tumor     Cancer     Clostridium difficile colitis 04/28/2014    GERD (gastroesophageal reflux disease)     Renal lesion      Social History     Socioeconomic History    Marital status: Single   Tobacco Use    Smoking status: Some Days     Types: Cigars    Smokeless tobacco: Never    Tobacco comments:     Cigar once a year   Substance and Sexual Activity    Alcohol use: No     Comment: occasionally    Drug use: No     Sexual activity: Never     Social Drivers of Health     Financial Resource Strain: Low Risk  (2/8/2024)    Overall Financial Resource Strain (CARDIA)     Difficulty of Paying Living Expenses: Not hard at all   Food Insecurity: No Food Insecurity (2/8/2024)    Hunger Vital Sign     Worried About Running Out of Food in the Last Year: Never true     Ran Out of Food in the Last Year: Never true   Transportation Needs: No Transportation Needs (2/8/2024)    PRAPARE - Transportation     Lack of Transportation (Medical): No     Lack of Transportation (Non-Medical): No   Physical Activity: Sufficiently Active (2/8/2024)    Exercise Vital Sign     Days of Exercise per Week: 3 days     Minutes of Exercise per Session: 60 min   Stress: No Stress Concern Present (2/8/2024)    [employer] [place] [employer] - Occupational Stress Questionnaire     Feeling of Stress : Not at all   Housing Stability: Low Risk  (2/8/2024)    Housing Stability Vital Sign     Unable to Pay for Housing in the Last Year: No     Number of Places Lived in the Last Year: 2     Unstable Housing in the Last Year: No     Family History   Problem Relation Name Age of Onset    Hyperlipidemia Mother      Hyperlipidemia Father      No Known Problems Sister      Hyperlipidemia Brother      Hyperlipidemia Maternal Uncle      Diabetes Maternal Grandmother      Lung cancer Maternal Grandmother       Review of patient's allergies indicates:  No Known Allergies    Current Outpatient Medications:     ascorbic acid, vitamin C, (VITAMIN C) 1000 MG tablet, Take 1,000 mg by mouth once daily., Disp: , Rfl:     Lactobacillus acidophilus (PROBIOTIC ORAL), Take 1 tablet by mouth once daily., Disp: , Rfl:     levETIRAcetam (KEPPRA) 750 MG Tab, Take 1 tablet (750 mg total) by mouth 2 (two) times daily., Disp: 180 tablet, Rfl: 3    levocetirizine (XYZAL) 5 MG tablet, Take 5 mg by mouth every evening., Disp: , Rfl:     multivitamin (THERAGRAN) per tablet, Take 1 tablet  "by mouth once daily., Disp: , Rfl:     ondansetron (ZOFRAN) 8 MG tablet, Take 8mg (1 tab) 30-45 minutes prior to chemotherapy dose and every 8 hours as needed, Disp: 30 tablet, Rfl: 3    temozolomide (TEMODAR) 100 MG capsule, TAKE FOUR 100MG CAPSULES WITH  ONE 5MG CAPSULE (TOTAL 405MG) BY MOUTH DAILY FOR 5 DAYS OF 28 DAY CYCLE, Disp: 20 capsule, Rfl: 0    temozolomide (TEMODAR) 5 MG capsule, TAKE ONE 5MG CAPSULE WITH FOUR  100MG CAPSULES (TOTAL 405MG) BY  MOUTH DAILY FOR 5 DAYS OF 28 DAY CYCLE, Disp: 5 capsule, Rfl: 0  No current facility-administered medications for this visit.  Resp. rate 18, height 6' 1" (1.854 m), weight 75.5 kg (166 lb 7.2 oz).      Neurological Exam  AAOx4, NAD  Strength/sensation grossly intact and symmetric  R Visual field cut  Wounds well healed    Imaging:  MRI brain (tumor with perfusion) w/wo contrast dated 01/15/2025 personally reviewed and discussed with patient.    There is no evidence of new enhancement in the region of the previously resected left-sided oligodendroglioma.  FLAIR signal superior and anterior to the  resection cavity is stable.  Perfusion imaging reveals no areas of increased blood volume adjacent to the resection cavity to suggest active tumor.    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     He is doing very well with stable imaging and neurologic exam    Plan for repeat MRI brain with and without contrast in 3 months    I spent a total of 40 minutes on the day of the visit.This includes face to face time and non-face to face time preparing to see the patient (eg, review of tests), obtaining and/or reviewing separately obtained history, documenting clinical information in the electronic or other health record, independently interpreting results and communicating results to the patient/family/caregiver, or care coordinator.        "

## 2025-01-17 NOTE — PROGRESS NOTES
Chart reviewed for oncology navigational needs. Patient will begin temodar night prior to radiation.   Will continue to monitor for oncology navigational needs.       
Statement Selected

## 2025-02-11 ENCOUNTER — PATIENT MESSAGE (OUTPATIENT)
Dept: PRIMARY CARE CLINIC | Facility: CLINIC | Age: 45
End: 2025-02-11
Payer: COMMERCIAL

## 2025-04-17 ENCOUNTER — OFFICE VISIT (OUTPATIENT)
Facility: CLINIC | Age: 45
End: 2025-04-17
Payer: COMMERCIAL

## 2025-04-17 ENCOUNTER — HOSPITAL ENCOUNTER (OUTPATIENT)
Dept: RADIOLOGY | Facility: HOSPITAL | Age: 45
Discharge: HOME | End: 2025-04-17
Attending: PSYCHIATRY & NEUROLOGY
Payer: COMMERCIAL

## 2025-04-17 ENCOUNTER — TELEPHONE (OUTPATIENT)
Dept: NEUROSURGERY | Facility: CLINIC | Age: 45
End: 2025-04-17
Payer: COMMERCIAL

## 2025-04-17 VITALS
WEIGHT: 167.75 LBS | SYSTOLIC BLOOD PRESSURE: 100 MMHG | HEIGHT: 73 IN | DIASTOLIC BLOOD PRESSURE: 60 MMHG | BODY MASS INDEX: 22.23 KG/M2 | HEART RATE: 63 BPM

## 2025-04-17 DIAGNOSIS — C71.9 OLIGODENDROGLIOMA DETERMINED BY BIOPSY OF BRAIN: Primary | ICD-10-CM

## 2025-04-17 DIAGNOSIS — C71.9 OLIGODENDROGLIOMA DETERMINED BY BIOPSY OF BRAIN: ICD-10-CM

## 2025-04-17 DIAGNOSIS — R56.9 SEIZURE: ICD-10-CM

## 2025-04-17 PROCEDURE — 70553 MRI BRAIN STEM W/O & W/DYE: CPT | Mod: TC

## 2025-04-17 PROCEDURE — A9585 GADOBUTROL INJECTION: HCPCS | Performed by: PSYCHIATRY & NEUROLOGY

## 2025-04-17 PROCEDURE — 25500020 PHARM REV CODE 255: Performed by: PSYCHIATRY & NEUROLOGY

## 2025-04-17 PROCEDURE — 3078F DIAST BP <80 MM HG: CPT | Mod: CPTII,S$GLB,, | Performed by: PSYCHIATRY & NEUROLOGY

## 2025-04-17 PROCEDURE — 1159F MED LIST DOCD IN RCRD: CPT | Mod: CPTII,S$GLB,, | Performed by: PSYCHIATRY & NEUROLOGY

## 2025-04-17 PROCEDURE — 70553 MRI BRAIN STEM W/O & W/DYE: CPT | Mod: 26,,, | Performed by: RADIOLOGY

## 2025-04-17 PROCEDURE — 99214 OFFICE O/P EST MOD 30 MIN: CPT | Mod: S$GLB,,, | Performed by: PSYCHIATRY & NEUROLOGY

## 2025-04-17 PROCEDURE — 3074F SYST BP LT 130 MM HG: CPT | Mod: CPTII,S$GLB,, | Performed by: PSYCHIATRY & NEUROLOGY

## 2025-04-17 PROCEDURE — 3008F BODY MASS INDEX DOCD: CPT | Mod: CPTII,S$GLB,, | Performed by: PSYCHIATRY & NEUROLOGY

## 2025-04-17 PROCEDURE — 99999 PR PBB SHADOW E&M-EST. PATIENT-LVL III: CPT | Mod: PBBFAC,,, | Performed by: PSYCHIATRY & NEUROLOGY

## 2025-04-17 PROCEDURE — G2211 COMPLEX E/M VISIT ADD ON: HCPCS | Mod: S$GLB,,, | Performed by: PSYCHIATRY & NEUROLOGY

## 2025-04-17 RX ORDER — GADOBUTROL 604.72 MG/ML
8 INJECTION INTRAVENOUS
Status: COMPLETED | OUTPATIENT
Start: 2025-04-17 | End: 2025-04-17

## 2025-04-17 RX ADMIN — GADOBUTROL 8 ML: 604.72 INJECTION INTRAVENOUS at 10:04

## 2025-04-17 NOTE — PROGRESS NOTES
Subjective:       Patient ID: Carlos Jacobson is a 44 y.o. male.    Chief Complaint: olidendroglioma    Follow-up  Associated symptoms include headaches. Pertinent negatives include no abdominal pain, arthralgias, chest pain, coughing, fatigue, fever, nausea, numbness or weakness.     5/2023: presented to urgent care for evaluation of tunnel vision and left foot numbness. CT head showed a left frontal lesion. MRI brain confirmed diffuse, non-enhancing tumor within the temporal and parietal lobes involving the cingulate gyrus and crossing the midline via diffuse infiltration of the posterior corpus callosum  6/13/2023: biopsy of lesion (Michele Schuler) with pathology consistent with oligodendroglioma, WHO grade 2  7/27/2023: resection of lesion with Dr. 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     Past Medical History:   Diagnosis Date    Allergy     Anxiety     Brain tumor     Cancer     Clostridium difficile colitis 04/28/2014    GERD (gastroesophageal reflux disease)     Renal lesion       Current Outpatient Medications   Medication Sig Dispense Refill    ascorbic acid, vitamin C, (VITAMIN C) 1000 MG tablet Take 1,000 mg by mouth once daily.      Lactobacillus acidophilus (PROBIOTIC ORAL) Take 1 tablet by mouth once daily.      levETIRAcetam (KEPPRA) 750 MG Tab Take 1 tablet (750 mg total) by mouth 2 (two) times daily. 180 tablet 3    levocetirizine (XYZAL) 5 MG tablet Take 5 mg by mouth every evening.      multivitamin (THERAGRAN)  per tablet Take 1 tablet by mouth once daily.      ondansetron (ZOFRAN) 8 MG tablet Take 8mg (1 tab) 30-45 minutes prior to chemotherapy dose and every 8 hours as needed 30 tablet 3    temozolomide (TEMODAR) 100 MG capsule TAKE FOUR 100MG CAPSULES WITH  ONE 5MG CAPSULE (TOTAL 405MG) BY MOUTH DAILY FOR 5 DAYS OF 28 DAY CYCLE 20 capsule 0    temozolomide (TEMODAR) 5 MG capsule TAKE ONE 5MG CAPSULE WITH FOUR  100MG CAPSULES (TOTAL 405MG) BY  MOUTH DAILY FOR 5 DAYS OF 28 DAY CYCLE 5 capsule 0     No current facility-administered medications for this visit.      Past Surgical History:   Procedure Laterality Date    BILATERAL INGUINAL HERNIA REPAIR Bilateral 1987    COLONOSCOPY  2014    CRANIOTOMY Left 7/27/2023    Procedure: CRANIOTOMY;  Surgeon: Michele Schuler MD;  Location: [address];  Service: Neurosurgery;  Laterality: Left;  LEFT PARIETAL CRANIOTOMY WITH RESECTION OF BRAIN MASS WITH DR. MIMS    STEREOTACTIC BIOPSY OF BRAIN Left 6/13/2023    Procedure: BIOPSY, BRAIN, STEREOTACTIC - LEFT PARIETAL BRAIN BIOPSY;  Surgeon: Michele Schuler MD;  Location: [facility];  Service: Neurosurgery;  Laterality: Left;      Family History   Problem Relation Name Age of Onset    Hyperlipidemia Mother      Hyperlipidemia Father      No Known Problems Sister      Hyperlipidemia Brother      Hyperlipidemia Maternal Uncle      Diabetes Maternal Grandmother      Lung cancer Maternal Grandmother        Social History     Tobacco Use    Smoking status: Some Days     Types: Cigars    Smokeless tobacco: Never    Tobacco comments:     Cigar once a year   Substance Use Topics    Alcohol use: No     Comment: occasionally    Drug use: No      Review of Systems   Constitutional:  Negative for fatigue, fever and unexpected weight change.   HENT:  Negative for dental problem and drooling.    Eyes:  Positive for photophobia and visual disturbance.   Respiratory:  Negative for cough and shortness of breath.    Cardiovascular:  Negative for  chest pain and leg swelling.   Gastrointestinal:  Negative for abdominal pain and nausea.   Genitourinary:  Negative for difficulty urinating and dysuria.   Musculoskeletal:  Negative for arthralgias and back pain.   Neurological:  Positive for headaches. Negative for dizziness, seizures, weakness and numbness.   Psychiatric/Behavioral:  Negative for agitation and confusion.      KPS: 90      Objective:      Vitals:    04/17/25 1046   BP: 100/60   Pulse: 63       NEUROLOGICAL EXAMINATION:     MENTAL STATUS   Attention: normal. Concentration: normal.   Speech: speech is normal   Level of consciousness: alert    CRANIAL NERVES     CN II   Right visual field deficit: Right homonymous hemainopia.    CN III, IV, VI   Extraocular motions are normal.     CN V   Facial sensation intact.     CN VII   Facial expression full, symmetric.     CN VIII   CN VIII normal.     CN IX, X   CN IX normal.   CN X normal.     CN XI   CN XI normal.     CN XII   CN XII normal.     MOTOR EXAM     Strength   Strength 5/5 throughout.     SENSORY EXAM   Light touch normal.     GAIT AND COORDINATION     Gait  Gait: normal     Coordination   Finger to nose coordination: normal    MRI brain from 4/17/2025 was personally visualized and compared to prior. Per my read, there is no evidence of tumor progression.      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 male with an oligodendroglioma, WHO grade 2 s/p subtotal resection, chemoradiation, and adjuvant TMZ completed 12/2024 presenting for follow up. Tumor diagnosis was heralded by left foot numbness and tunnel vision. Current symptoms include mild visual changes.    Oligodendroglioma  MRI brain from earlier today is stable. F/u in 3 months with surveillance MRI brain.    Seizure  Had episodic aphasia for 30 seconds at a time with  quick return to baseline consistent with focal seizure. Resolved once AED started. Continue levetiracetam 750mg bid.                     Candice Mabry MD  Department of Neurology  Neuro-oncology Section

## 2025-05-06 ENCOUNTER — OFFICE VISIT (OUTPATIENT)
Dept: NEUROSURGERY | Facility: CLINIC | Age: 45
End: 2025-05-06
Payer: COMMERCIAL

## 2025-05-06 VITALS
DIASTOLIC BLOOD PRESSURE: 71 MMHG | BODY MASS INDEX: 22.23 KG/M2 | WEIGHT: 167.75 LBS | SYSTOLIC BLOOD PRESSURE: 113 MMHG | HEART RATE: 68 BPM | RESPIRATION RATE: 18 BRPM | HEIGHT: 73 IN

## 2025-05-06 DIAGNOSIS — C71.9 OLIGODENDROGLIOMA DETERMINED BY BIOPSY OF BRAIN: Primary | ICD-10-CM

## 2025-05-06 PROCEDURE — 1159F MED LIST DOCD IN RCRD: CPT | Mod: CPTII,S$GLB,, | Performed by: NEUROLOGICAL SURGERY

## 2025-05-06 PROCEDURE — 3078F DIAST BP <80 MM HG: CPT | Mod: CPTII,S$GLB,, | Performed by: NEUROLOGICAL SURGERY

## 2025-05-06 PROCEDURE — 3008F BODY MASS INDEX DOCD: CPT | Mod: CPTII,S$GLB,, | Performed by: NEUROLOGICAL SURGERY

## 2025-05-06 PROCEDURE — 3074F SYST BP LT 130 MM HG: CPT | Mod: CPTII,S$GLB,, | Performed by: NEUROLOGICAL SURGERY

## 2025-05-06 PROCEDURE — 99214 OFFICE O/P EST MOD 30 MIN: CPT | Mod: S$GLB,,, | Performed by: NEUROLOGICAL SURGERY

## 2025-05-06 NOTE — PROGRESS NOTES
Neurosurgery History & Physical    Patient ID: Carlos Jacobson is a 44 y.o. male.    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    Since his last visit, he continues to reports doing well.  He denies any new neurologic issues.  He continues to follow with Dr. Mabry, last Temodar treatment in 01/2025.      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    At last visit, imaging was reviewed which was stable.  Plan was for him to finish his Temodar treatment by November 20, 2024.     Since his last visit, he reports that he has been doing very well with no new neurologic issues.  He has returned to work and is driving without issue.       Interval HPI 07/16/2024:  He has been undergoing monthly TMZ cycles since 12/2023.  He should begin his monthly round tonight if it arrives in the mail today.  He reports that TMZ cycles are scheduled to end in November 2024 but is unsure of the plan beyond that.  He had radiation from 9/6/2023 - 10/17/2023.     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  He reports increased fatigue by the third day of his TMZ cycle.  He denies neurologic symptoms.     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  He likes to garden which he uses as therapy.       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  He has been doing well overall.  He continues to have a right sided visual field cut.       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  Patient returns today for a general postoperative visit, review  of pathology, and discussion of next steps.  The patient denies any issues with his wound.  He did see Dr. Segura this morning at the request of Dr. Mabry with Neuro-Oncology.     History of Present Illness 06/13/2023: Mr. Jacobson is a 42 year old male who presents today for evaluation of recent brain imaging. Over the last 6 months, he reports pressure in his head and tinnitus at times. He has associated these symptoms with anxiety which he has struggled with over the last few years. He reports two episodes of left foot numbness and tunnel vision in recent weeks which lead him to seek evaluation at the urgent care. A CT was performed there which showed a brain lesion and PCP ordered MRI brain and CT C/A/P.      He feels that he has trouble with coordination when walking down stairs. Otherwise denies other neurologic symptom.      He has seen Urology regarding CT CAP which demonstrates small left renal lesion. They recommended follow up in 3 months for monitoring.     Review of Systems   Constitutional:  Negative for activity change and fever.   Eyes:  Negative for visual disturbance.   Respiratory:  Negative for shortness of breath.    Cardiovascular:  Negative for chest pain.   Gastrointestinal:  Negative for nausea and vomiting.   Endocrine: Negative for cold intolerance, heat intolerance, polydipsia, polyphagia and polyuria.   Genitourinary:  Negative for decreased urine volume, difficulty urinating and frequency.   Musculoskeletal:  Negative for back pain, gait problem and neck pain.   Neurological:  Negative for dizziness, tremors, seizures, syncope, facial asymmetry, speech difficulty, weakness, light-headedness, numbness and headaches.   Psychiatric/Behavioral:  Negative for confusion.        Past Medical History:   Diagnosis Date    Allergy     Anxiety     Brain tumor     Cancer     Clostridium difficile colitis 04/28/2014    GERD (gastroesophageal reflux disease)     Renal lesion      Social  "History[1]  Family History   Problem Relation Name Age of Onset    Hyperlipidemia Mother      Hyperlipidemia Father      No Known Problems Sister      Hyperlipidemia Brother      Hyperlipidemia Maternal Uncle      Diabetes Maternal Grandmother      Lung cancer Maternal Grandmother       Review of patient's allergies indicates:  No Known Allergies  Current Medications[2]  Blood pressure 113/71, pulse 68, resp. rate 18, height 6' 1" (1.854 m), weight 76.1 kg (167 lb 12.3 oz).      Neurological Exam  Mental Status  Awake, alert and oriented to person, place and time. Speech is normal. Language is fluent with no aphasia.    Cranial Nerves  CN II: Visual acuity is normal. Visual fields full to confrontation.  CN III, IV, VI: Extraocular movements intact bilaterally. Normal lids and orbits bilaterally. Pupils equal round and reactive to light bilaterally.  CN V: Facial sensation is normal.  CN VII: Full and symmetric facial movement.  CN VIII: Hearing is normal.  CN IX, X: Palate elevates symmetrically. Normal gag reflex.  CN XI: Shoulder shrug strength is normal.  CN XII: Tongue midline without atrophy or fasciculations.    Motor   Strength is 5/5 throughout all four extremities.    Sensory  Light touch is normal in upper and lower extremities.     Gait  Casual gait is normal including stance, stride, and arm swing.      Physical Exam  Eyes:      General: Lids are normal.      Extraocular Movements: Extraocular movements intact.      Pupils: Pupils are equal, round, and reactive to light.   Neurological:      Motor: Motor strength is normal.  Psychiatric:         Speech: Speech normal.         Imaging:  MRI of the brain with and without contrast and with perfusion imaging dated 04/17/2025 is personally reviewed and discussed with the patient.  There is no change in the size or configuration of the resection cavity or the surrounding T2 hyperintensity.  There is no new mass effect.  There is no new enhancement.  Per " report there is no increase in cerebral blood volume or perfusion.    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  From a NSGY standpoint, he is doing very well with stable imaging and neurologic exam.  We will plan to follow up to follow up in 2.5 months with repeat MRI brain with and without contrast.  MRI is already scheduled on 07/17/2025 at 9:15am.  He is agreeable to the plan and will notify our office if he has any questions or concerns in the meantime.      I spent a total of 35 minutes on the day of the visit.This includes face to face time and non-face to face time preparing to see the patient (eg, review of tests), obtaining and/or reviewing separately obtained history, documenting clinical information in the electronic or other health record, independently interpreting results and communicating results to the patient/family/caregiver, or care coordinator.           [1]   Social History  Socioeconomic History    Marital status: Single   Tobacco Use    Smoking status: Some Days     Types: Cigars    Smokeless tobacco: Never    Tobacco comments:     Cigar once a year   Substance and Sexual Activity    Alcohol use: No     Comment: occasionally    Drug use: No    Sexual activity: Never     Social Drivers of Health     Financial Resource Strain: Low Risk  (2/8/2024)    Overall Financial Resource Strain (CARDIA)     Difficulty of Paying Living Expenses: Not hard at all   Food Insecurity: No Food Insecurity (2/8/2024)    Hunger Vital Sign     Worried About Running Out of Food in the Last Year: Never true     Ran Out of Food in the Last Year: Never true   Transportation Needs: No Transportation Needs (2/8/2024)    PRAPARE - Transportation     Lack of Transportation (Medical): No     Lack of Transportation (Non-Medical): No   Physical Activity: Sufficiently Active (2/8/2024)    Exercise Vital Sign     Days of Exercise per Week: 3  days     Minutes of Exercise per Session: 60 min   Stress: No Stress Concern Present (2/8/2024)    [employer] - Occupational Stress Questionnaire     Feeling of Stress : Not at all   Housing Stability: Low Risk  (2/8/2024)    Housing Stability Vital Sign     Unable to Pay for Housing in the Last Year: No     Number of Places Lived in the Last Year: 2     Unstable Housing in the Last Year: No   [2]   Current Outpatient Medications:     ascorbic acid, vitamin C, (VITAMIN C) 1000 MG tablet, Take 1,000 mg by mouth once daily., Disp: , Rfl:     Lactobacillus acidophilus (PROBIOTIC ORAL), Take 1 tablet by mouth once daily., Disp: , Rfl:     levETIRAcetam (KEPPRA) 750 MG Tab, Take 1 tablet (750 mg total) by mouth 2 (two) times daily., Disp: 180 tablet, Rfl: 3    levocetirizine (XYZAL) 5 MG tablet, Take 5 mg by mouth every evening., Disp: , Rfl:     multivitamin (THERAGRAN) per tablet, Take 1 tablet by mouth once daily., Disp: , Rfl:     ondansetron (ZOFRAN) 8 MG tablet, Take 8mg (1 tab) 30-45 minutes prior to chemotherapy dose and every 8 hours as needed, Disp: 30 tablet, Rfl: 3    temozolomide (TEMODAR) 100 MG capsule, TAKE FOUR 100MG CAPSULES WITH  ONE 5MG CAPSULE (TOTAL 405MG) BY MOUTH DAILY FOR 5 DAYS OF 28 DAY CYCLE, Disp: 20 capsule, Rfl: 0    temozolomide (TEMODAR) 5 MG capsule, TAKE ONE 5MG CAPSULE WITH FOUR  100MG CAPSULES (TOTAL 405MG) BY  MOUTH DAILY FOR 5 DAYS OF 28 DAY CYCLE, Disp: 5 capsule, Rfl: 0

## 2025-06-11 ENCOUNTER — TELEPHONE (OUTPATIENT)
Dept: ADMINISTRATIVE | Facility: OTHER | Age: 45
End: 2025-06-11
Payer: COMMERCIAL

## 2025-06-11 NOTE — TELEPHONE ENCOUNTER
Attempted to contact patient to reschedule appointment with Dr. Mabry due to availability. Will attempt at a later time.

## 2025-06-11 NOTE — TELEPHONE ENCOUNTER
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 Informed  Edison that his appointment will need to be moved due to provider's scheduled. Appointment rescheduled for 7/16 at 9:20 am. MRI rescheduled for 7/16 at 7:30 am per patient's request.    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   
(4) no limitation

## 2025-07-16 ENCOUNTER — HOSPITAL ENCOUNTER (OUTPATIENT)
Dept: RADIOLOGY | Facility: HOSPITAL | Age: 45
Discharge: HOME | End: 2025-07-16
Attending: PSYCHIATRY & NEUROLOGY
Payer: COMMERCIAL

## 2025-07-16 ENCOUNTER — OFFICE VISIT (OUTPATIENT)
Facility: CLINIC | Age: 45
End: 2025-07-16
Payer: COMMERCIAL

## 2025-07-16 VITALS
SYSTOLIC BLOOD PRESSURE: 103 MMHG | BODY MASS INDEX: 22.17 KG/M2 | DIASTOLIC BLOOD PRESSURE: 64 MMHG | WEIGHT: 167.31 LBS | HEIGHT: 73 IN | HEART RATE: 58 BPM

## 2025-07-16 DIAGNOSIS — C71.9 OLIGODENDROGLIOMA DETERMINED BY BIOPSY OF BRAIN: ICD-10-CM

## 2025-07-16 DIAGNOSIS — C71.9 OLIGODENDROGLIOMA DETERMINED BY BIOPSY OF BRAIN: Primary | ICD-10-CM

## 2025-07-16 DIAGNOSIS — R56.9 SEIZURE: ICD-10-CM

## 2025-07-16 PROCEDURE — 25500020 PHARM REV CODE 255: Performed by: PSYCHIATRY & NEUROLOGY

## 2025-07-16 PROCEDURE — 3008F BODY MASS INDEX DOCD: CPT | Mod: CPTII,S$GLB,, | Performed by: PSYCHIATRY & NEUROLOGY

## 2025-07-16 PROCEDURE — 70553 MRI BRAIN STEM W/O & W/DYE: CPT | Mod: TC

## 2025-07-16 PROCEDURE — A9585 GADOBUTROL INJECTION: HCPCS | Performed by: PSYCHIATRY & NEUROLOGY

## 2025-07-16 PROCEDURE — 3078F DIAST BP <80 MM HG: CPT | Mod: CPTII,S$GLB,, | Performed by: PSYCHIATRY & NEUROLOGY

## 2025-07-16 PROCEDURE — 99215 OFFICE O/P EST HI 40 MIN: CPT | Mod: S$GLB,,, | Performed by: PSYCHIATRY & NEUROLOGY

## 2025-07-16 PROCEDURE — 99999 PR PBB SHADOW E&M-EST. PATIENT-LVL III: CPT | Mod: PBBFAC,,, | Performed by: PSYCHIATRY & NEUROLOGY

## 2025-07-16 PROCEDURE — 3074F SYST BP LT 130 MM HG: CPT | Mod: CPTII,S$GLB,, | Performed by: PSYCHIATRY & NEUROLOGY

## 2025-07-16 PROCEDURE — 1159F MED LIST DOCD IN RCRD: CPT | Mod: CPTII,S$GLB,, | Performed by: PSYCHIATRY & NEUROLOGY

## 2025-07-16 PROCEDURE — 70553 MRI BRAIN STEM W/O & W/DYE: CPT | Mod: 26,,, | Performed by: RADIOLOGY

## 2025-07-16 PROCEDURE — G2211 COMPLEX E/M VISIT ADD ON: HCPCS | Mod: S$GLB,,, | Performed by: PSYCHIATRY & NEUROLOGY

## 2025-07-16 RX ORDER — GADOBUTROL 604.72 MG/ML
9 INJECTION INTRAVENOUS
Status: COMPLETED | OUTPATIENT
Start: 2025-07-16 | End: 2025-07-16

## 2025-07-16 RX ADMIN — GADOBUTROL 9 ML: 604.72 INJECTION INTRAVENOUS at 08:07

## 2025-07-16 NOTE — PROGRESS NOTES
Subjective:       Patient ID: Carlos Jacobson is a 44 y.o. male.    Chief Complaint: olidendroglioma    Follow-up  Associated symptoms include headaches. Pertinent negatives include no abdominal pain, arthralgias, chest pain, coughing, fatigue, fever, nausea, numbness or weakness.     5/2023: presented to urgent care for evaluation of tunnel vision and left foot numbness. CT head showed a left frontal lesion. MRI brain confirmed diffuse, non-enhancing tumor within the temporal and parietal lobes involving the cingulate gyrus and crossing the midline via diffuse infiltration of the posterior corpus callosum  6/13/2023: biopsy of lesion (Michele Schuler) with pathology consistent with oligodendroglioma, WHO grade 2  7/27/2023: resection of lesion with Dr. 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     Past Medical History:   Diagnosis Date    Allergy     Anxiety     Brain tumor     Cancer     Clostridium difficile colitis 04/28/2014    GERD (gastroesophageal reflux disease)     Renal lesion       Current Outpatient Medications   Medication Sig Dispense Refill    ascorbic acid, vitamin C, (VITAMIN C) 1000 MG tablet Take 1,000 mg by mouth once daily.      Lactobacillus acidophilus (PROBIOTIC ORAL) Take 1 tablet by mouth once daily.      levETIRAcetam (KEPPRA) 750 MG Tab Take 1 tablet (750 mg total) by mouth 2 (two) times daily. 180 tablet 3    levocetirizine (XYZAL) 5 MG tablet Take 5 mg by mouth every evening.      multivitamin (THERAGRAN)  per tablet Take 1 tablet by mouth once daily.      ondansetron (ZOFRAN) 8 MG tablet Take 8mg (1 tab) 30-45 minutes prior to chemotherapy dose and every 8 hours as needed 30 tablet 3    temozolomide (TEMODAR) 100 MG capsule TAKE FOUR 100MG CAPSULES WITH  ONE 5MG CAPSULE (TOTAL 405MG) BY MOUTH DAILY FOR 5 DAYS OF 28 DAY CYCLE 20 capsule 0    temozolomide (TEMODAR) 5 MG capsule TAKE ONE 5MG CAPSULE WITH FOUR  100MG CAPSULES (TOTAL 405MG) BY  MOUTH DAILY FOR 5 DAYS OF 28 DAY CYCLE 5 capsule 0     No current facility-administered medications for this visit.      Past Surgical History:   Procedure Laterality Date    BILATERAL INGUINAL HERNIA REPAIR Bilateral 1987    COLONOSCOPY  2014    CRANIOTOMY Left 7/27/2023    Procedure: CRANIOTOMY;  Surgeon: Michele Schuler MD;  Location: [address];  Service: Neurosurgery;  Laterality: Left;  LEFT PARIETAL CRANIOTOMY WITH RESECTION OF BRAIN MASS WITH DR. MIMS    STEREOTACTIC BIOPSY OF BRAIN Left 6/13/2023    Procedure: BIOPSY, BRAIN, STEREOTACTIC - LEFT PARIETAL BRAIN BIOPSY;  Surgeon: Michele Schuler MD;  Location: [facility];  Service: Neurosurgery;  Laterality: Left;      Family History   Problem Relation Name Age of Onset    Hyperlipidemia Mother      Hyperlipidemia Father      No Known Problems Sister      Hyperlipidemia Brother      Hyperlipidemia Maternal Uncle      Diabetes Maternal Grandmother      Lung cancer Maternal Grandmother        Social History     Tobacco Use    Smoking status: Some Days     Types: Cigars    Smokeless tobacco: Never    Tobacco comments:     Cigar once a year   Substance Use Topics    Alcohol use: No     Comment: occasionally    Drug use: No      Review of Systems   Constitutional:  Negative for fatigue, fever and unexpected weight change.   HENT:  Negative for dental problem and drooling.    Eyes:  Positive for photophobia and visual disturbance.   Respiratory:  Negative for cough and shortness of breath.    Cardiovascular:  Negative for  chest pain and leg swelling.   Gastrointestinal:  Negative for abdominal pain and nausea.   Genitourinary:  Negative for difficulty urinating and dysuria.   Musculoskeletal:  Negative for arthralgias and back pain.   Neurological:  Positive for headaches. Negative for dizziness, seizures, weakness and numbness.   Psychiatric/Behavioral:  Negative for agitation and confusion.      KPS: 90      Objective:      Vitals:    07/16/25 0853   BP: 103/64   Pulse: (!) 58       NEUROLOGICAL EXAMINATION:     MENTAL STATUS   Attention: normal. Concentration: normal.   Speech: speech is normal   Level of consciousness: alert    CRANIAL NERVES     CN II   Right visual field deficit: Right homonymous hemainopia.    CN III, IV, VI   Extraocular motions are normal.     CN V   Facial sensation intact.     CN VII   Facial expression full, symmetric.     CN VIII   CN VIII normal.     CN IX, X   CN IX normal.   CN X normal.     CN XI   CN XI normal.     CN XII   CN XII normal.     MOTOR EXAM     Strength   Strength 5/5 throughout.     SENSORY EXAM   Light touch normal.     GAIT AND COORDINATION     Gait  Gait: normal     Coordination   Finger to nose coordination: normal    MRI brain from 7/16/2025 was personally visualized and compared to prior. Per my read, there is no evidence of tumor progression.      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 male with an oligodendroglioma, WHO grade 2 s/p subtotal resection, chemoradiation, and adjuvant TMZ completed 12/2024 presenting for follow up. Tumor diagnosis was heralded by left foot numbness and tunnel vision. Current symptoms include mild visual changes.    Oligodendroglioma  MRI brain from earlier today is stable. F/u in 3 months with surveillance  MRI brain.    Seizure  After weaning prophylactic Keppra after first surgery, he had episodic aphasia for 30 seconds at a time with quick return to baseline consistent with focal seizure. Resolved once AED re-started. Continue levetiracetam 750mg bid.                 Candice Mabry MD  Department of Neurology  Neuro-oncology Section

## 2025-07-24 ENCOUNTER — OFFICE VISIT (OUTPATIENT)
Dept: PRIMARY CARE CLINIC | Facility: CLINIC | Age: 45
End: 2025-07-24
Payer: COMMERCIAL

## 2025-07-24 VITALS
DIASTOLIC BLOOD PRESSURE: 70 MMHG | RESPIRATION RATE: 18 BRPM | HEIGHT: 73 IN | TEMPERATURE: 98 F | HEART RATE: 65 BPM | BODY MASS INDEX: 21.92 KG/M2 | OXYGEN SATURATION: 98 % | SYSTOLIC BLOOD PRESSURE: 92 MMHG | WEIGHT: 165.38 LBS

## 2025-07-24 DIAGNOSIS — Z51.81 ENCOUNTER FOR MONITORING LONG-TERM ANTICONVULSANT THERAPY: ICD-10-CM

## 2025-07-24 DIAGNOSIS — Z23 NEED FOR VACCINATION: ICD-10-CM

## 2025-07-24 DIAGNOSIS — N28.89 LEFT RENAL MASS: ICD-10-CM

## 2025-07-24 DIAGNOSIS — Z13.6 ENCOUNTER FOR SCREENING FOR CARDIOVASCULAR DISORDERS: ICD-10-CM

## 2025-07-24 DIAGNOSIS — Z00.00 ANNUAL PHYSICAL EXAM: Primary | ICD-10-CM

## 2025-07-24 DIAGNOSIS — Z79.899 ENCOUNTER FOR MONITORING LONG-TERM ANTICONVULSANT THERAPY: ICD-10-CM

## 2025-07-24 DIAGNOSIS — C71.9 OLIGODENDROGLIOMA DETERMINED BY BIOPSY OF BRAIN: ICD-10-CM

## 2025-07-24 DIAGNOSIS — G40.A09 NONINTRACTABLE ABSENCE EPILEPSY WITHOUT STATUS EPILEPTICUS: ICD-10-CM

## 2025-07-24 DIAGNOSIS — Z13.1 SCREENING FOR DIABETES MELLITUS: ICD-10-CM

## 2025-07-24 PROCEDURE — 99999 PR PBB SHADOW E&M-EST. PATIENT-LVL IV: CPT | Mod: PBBFAC,,, | Performed by: FAMILY MEDICINE

## 2025-07-24 NOTE — PROGRESS NOTES
Assessment:       1. Annual physical exam    2. 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 Nonintractable absence epilepsy without status epilepticus    4. Need for vaccination    5. Encounter for monitoring long-term anticonvulsant therapy    6. Encounter for screening for cardiovascular disorders    7. Screening for diabetes mellitus    8. 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    Nonintractable absence epilepsy without status epilepticus    Need for vaccination  -     pneumoc 20-ramiro conj-dip cr(PF) (PREVNAR-20 (PF)) injection Syrg 0.5 mL    Encounter for monitoring long-term anticonvulsant therapy  -     Levetiracetam Level; Future; Expected date: 07/24/2025    Encounter for screening for cardiovascular disorders  -     CBC Auto Differential; Future; Expected date: 07/24/2025  -     Comprehensive Metabolic Panel; Future; Expected date: 07/24/2025  -     Lipid Panel; Future; Expected date: 07/24/2025  -     TSH; Future; Expected date: 07/24/2025    Screening for diabetes mellitus  -     Hemoglobin A1C; Future; Expected date: 07/24/2025    Left renal mass  -     US Retroperitoneal Complete; Future; Expected date: 07/24/2025  -     URINALYSIS; Future; Expected date: 07/24/2025      Assessment & Plan    C71.9 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  - Continued Keppra at current dose due to positive effects and minimal side effects, weighing benefits against potential risks of discontinuation.  - Evaluated need for drug level monitoring to ensure appropriate therapeutic range.  - Reviewed incidental finding of 1.3 cm heterogeneous lesion in left kidney upper pole from previous CT.    OLIGODENDROGLIOMA DETERMINED BY BIOPSY OF BRAIN:   Explained increased risk of complications from infectious diseases due to history of brain cancer.   Continued probiotic daily.    NEED FOR VACCINATION:   Administered pneumonia vaccination in office.    ENCOUNTER FOR MONITORING LONG-TERM ANTICONVULSANT THERAPY:   Continued Keppra at current dose due to positive effects and minimal side effects, weighing benefits against potential risks of discontinuation.   Discussed potential side effects of Keppra, including increased fatigue and mood changes.    ENCOUNTER FOR SCREENING FOR CARDIOVASCULAR DISORDERS:   Ordered labs (fasting).    SCREENING FOR DIABETES MELLITUS:   Ordered labs (fasting).    LEFT RENAL MASS:   Ordered kidney US and urinalysis to investigate kidney lesion further.       Medication List with Changes/Refills   Current Medications    ASCORBIC ACID, VITAMIN C, (VITAMIN C) 1000 MG TABLET    Take 1,000 mg by mouth once daily.    LACTOBACILLUS ACIDOPHILUS (PROBIOTIC ORAL)    Take 1 tablet by mouth once daily.    LEVETIRACETAM (KEPPRA) 750 MG TAB    Take 1 tablet (750 mg total) by mouth 2 (two) times daily.    LEVOCETIRIZINE (XYZAL) 5 MG TABLET    Take 5 mg by mouth every evening.    MULTIVITAMIN (THERAGRAN) PER TABLET    Take 1 tablet by mouth once daily.    ONDANSETRON (ZOFRAN) 8 MG TABLET    Take 8mg (1 tab)  30-45 minutes prior to chemotherapy dose and every 8 hours as needed         Subjective:    Patient ID: Carlos Jacobson is a 44 y.o. male.  Chief Complaint: Annual Exam    HPI  History of Present Illness    CHIEF COMPLAINT:  Patient presents today for regular follow-up.    BRAIN TUMOR AND SEIZURE HISTORY:  He has a brain tumor managed with neurologist follow-up every three months, with potential extension to every four months after one year, and six months thereafter if condition remains stable. He has a history of radiation treatment which previously affected his hair growth. He has a 10+ year history of seizures prior to treatment, with symptoms including tunnel vision, left leg numbness, and communication difficulties. Seizure symptoms were progressively worsening before medication intervention. He currently takes Keppra with excellent seizure control and reports no episodes since initiation, though experiences increased tiredness and potential mood swings as side effects.    CURRENT SYMPTOMS:  He reports rare headaches occurring approximately every three months that resolve spontaneously. He denies any current swallowing difficulties, balance issues, or coordination problems.    ALLERGIES:  He manages seasonal allergies with Xyzal 5mg in the evening, which has been effective in controlling symptoms and preventing sinus infections.    IMAGING:  CT in May 2023 showed an incidental finding of a potential abnormality in the groin area, possibly a tumor or kidney stone. Medical providers have opted to prioritize brain tumor management over this finding.    FAMILY HISTORY:  Parents have hypertension, potentially attributed to dietary habits. He denies family history of heart disease or cancer.    MEDICATIONS:  Current medications include Keppra, Xyzal 5mg nightly, and a daily probiotic.      ROS:  Constitutional: -fevers, -chills, -night sweats  Head: +headaches  ENT: -difficulty swallowing  Respiratory: -cough,  "-wheezing  Cardiovascular: -chest pain, -palpitations  Integumentary: -rash  Hematologic/Lymphatic: -easy bleeding tendency, -easy bruising  Allergic: +seasonal allergies       Review of Systems    Objective:      Vitals:    07/24/25 0902   BP: 92/70   BP Location: Right arm   Patient Position: Sitting   Pulse: 65   Resp: 18   Temp: 97.8 °F (36.6 °C)   TempSrc: Oral   SpO2: 98%   Weight: 75 kg (165 lb 5.5 oz)   Height: 6' 1" (1.854 m)     BP Readings from Last 5 Encounters:   07/24/25 92/70   07/16/25 103/64   05/06/25 113/71   04/17/25 100/60   01/15/25 126/83     Wt Readings from Last 5 Encounters:   07/24/25 75 kg (165 lb 5.5 oz)   07/16/25 75.9 kg (167 lb 5.3 oz)   05/06/25 76.1 kg (167 lb 12.3 oz)   04/17/25 76.1 kg (167 lb 12.3 oz)   01/15/25 75.5 kg (166 lb 7.2 oz)     Physical Exam  Physical Exam    General: Well-developed. Well-nourished. No acute distress.  Eyes: EOMI. Sclerae anicteric.  HENT: Normocephalic. Atraumatic. Nares patent. Moist oral mucosa.  Cardiovascular: Regular rate. Regular rhythm. No murmurs. No rubs. No gallops. Normal S1, S2.  Respiratory: Normal respiratory effort. Clear to auscultation bilaterally. No rales. No rhonchi. No wheezing.  Musculoskeletal: No  obvious deformity.  Extremities: No lower extremity edema.  Neurological: Alert & oriented x3. No slurred speech. Normal gait.  Psychiatric: Normal mood. Normal affect. Good insight. Good judgment.  Skin: Warm. Dry. No rash.         Lab Results   Component Value Date    WBC 3.93 01/15/2025    HGB 14.0 01/15/2025    HCT 40.6 01/15/2025     01/15/2025    CHOL 239 (H) 05/24/2023    TRIG 210 (H) 05/24/2023    HDL 42 05/24/2023    ALT 16 01/15/2025    AST 14 01/15/2025     01/15/2025    K 5.0 01/15/2025     01/15/2025    CREATININE 0.8 01/15/2025    BUN 17 01/15/2025    CO2 28 01/15/2025    TSH 1.521 05/24/2023    INR 1.0 07/28/2023      This note was generated with the assistance of ambient listening technology. Verbal " consent was obtained by the patient and accompanying visitor(s) for the recording of patient appointment to facilitate this note. I attest to having reviewed and edited the generated note for accuracy, though some syntax or spelling errors may persist. Please contact the author of this note for any clarification.

## 2025-07-29 ENCOUNTER — OFFICE VISIT (OUTPATIENT)
Dept: NEUROSURGERY | Facility: CLINIC | Age: 45
End: 2025-07-29
Payer: COMMERCIAL

## 2025-07-29 VITALS
RESPIRATION RATE: 18 BRPM | HEIGHT: 73 IN | BODY MASS INDEX: 21.92 KG/M2 | HEART RATE: 62 BPM | SYSTOLIC BLOOD PRESSURE: 102 MMHG | DIASTOLIC BLOOD PRESSURE: 67 MMHG | WEIGHT: 165.38 LBS

## 2025-07-29 DIAGNOSIS — C71.9 OLIGODENDROGLIOMA DETERMINED BY BIOPSY OF BRAIN: Primary | ICD-10-CM

## 2025-07-29 PROCEDURE — 3078F DIAST BP <80 MM HG: CPT | Mod: CPTII,S$GLB,, | Performed by: NEUROLOGICAL SURGERY

## 2025-07-29 PROCEDURE — 1159F MED LIST DOCD IN RCRD: CPT | Mod: CPTII,S$GLB,, | Performed by: NEUROLOGICAL SURGERY

## 2025-07-29 PROCEDURE — 3074F SYST BP LT 130 MM HG: CPT | Mod: CPTII,S$GLB,, | Performed by: NEUROLOGICAL SURGERY

## 2025-07-29 PROCEDURE — 99214 OFFICE O/P EST MOD 30 MIN: CPT | Mod: S$GLB,,, | Performed by: NEUROLOGICAL SURGERY

## 2025-07-29 PROCEDURE — 3008F BODY MASS INDEX DOCD: CPT | Mod: CPTII,S$GLB,, | Performed by: NEUROLOGICAL SURGERY

## 2025-07-29 NOTE — PROGRESS NOTES
Neurosurgery History & Physical    Patient ID: Carlos Jacobson is a 44 y.o. male.    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    Since his last visit, he continues to report doing well and denies new neurologic symptoms.  During his last visit with Dr. Mabry, she continued his Keppra 750mg BID.  He is scheduled to follow up with Dr. Mabry with repeat MRI brain in 3 months.      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     Since his last visit, he continues to reports doing well.  He denies any new neurologic issues.  He continues to follow with Dr. Mabry, last Temodar treatment in 01/2025.       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    At last visit, imaging was reviewed which was stable.  Plan was for him to finish his Temodar treatment by November 20, 2024.     Since his last visit, he reports that he has been doing very well with no new neurologic issues.  He has returned to work and is driving without issue.       Interval HPI 07/16/2024:  He has been undergoing monthly TMZ cycles since 12/2023.  He should begin his monthly round tonight if it arrives in the mail today.  He reports that TMZ cycles are scheduled to end in November 2024 but is unsure of the plan beyond that.  He had radiation from 9/6/2023 - 10/17/2023.     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  He reports increased fatigue by the third day of his TMZ cycle.  He denies neurologic symptoms.     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  He likes to garden which he uses as therapy.       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  He has been doing well  overall.  He continues to have a right sided visual field cut.       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  Patient returns today for a general postoperative visit, review of pathology, and discussion of next steps.  The patient denies any issues with his wound.  He did see Dr. Segura this morning at the request of Dr. Mabry with Neuro-Oncology.     History of Present Illness 06/13/2023: Mr. Jacobson is a 42 year old male who presents today for evaluation of recent brain imaging. Over the last 6 months, he reports pressure in his head and tinnitus at times. He has associated these symptoms with anxiety which he has struggled with over the last few years. He reports two episodes of left foot numbness and tunnel vision in recent weeks which lead him to seek evaluation at the urgent care. A CT was performed there which showed a brain lesion and PCP ordered MRI brain and CT C/A/P.      He feels that he has trouble with coordination when walking down stairs. Otherwise denies other neurologic symptom.      He has seen Urology regarding CT CAP which demonstrates small left renal lesion. They recommended follow up in 3 months for monitoring.     Review of Systems   Constitutional:  Negative for activity change and fever.   Eyes:  Negative for visual disturbance.   Respiratory:  Negative for shortness of breath.    Cardiovascular:  Negative for chest pain.   Gastrointestinal:  Negative for nausea and vomiting.   Endocrine: Negative for cold intolerance, heat intolerance, polydipsia, polyphagia and polyuria.   Genitourinary:  Negative for decreased urine volume, difficulty urinating and frequency.   Musculoskeletal:  Negative for back pain, gait problem and neck pain.   Neurological:  Negative for dizziness, tremors, seizures, syncope, facial asymmetry, speech  "difficulty, weakness, light-headedness, numbness and headaches.   Psychiatric/Behavioral:  Negative for confusion.        Past Medical History:   Diagnosis Date    Allergy     Anxiety     Brain tumor     Cancer     Clostridium difficile colitis 04/28/2014    GERD (gastroesophageal reflux disease)     Renal lesion      Social History[1]  Family History   Problem Relation Name Age of Onset    Hypertension Mother      Hyperlipidemia Mother      Hypertension Father      Hyperlipidemia Father      No Known Problems Sister      Hyperlipidemia Brother      Hyperlipidemia Maternal Uncle      Diabetes Maternal Grandmother      Lung cancer Maternal Grandmother       Review of patient's allergies indicates:  No Known Allergies  Current Medications[2]  Resp. rate 18, height 6' 1" (1.854 m), weight 75 kg (165 lb 5.5 oz).      Neurological Exam  AAOx4, NAD  MAEW  MS grossly intact  Gait fluid     Physical Exam    Imaging:  MRI brain (tumor with perfusion) with and without contrast dated 07/16/2025 personally reviewed and discussed with patient.  No areas of new enhancement are noted.  Imaging appears stable when compared to MRI from 04/17/2025.    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  From a NSGY standpoint, he is doing very well with stable imaging and neurologic exam.  We will plan to follow up to follow up in 3 months with repeat MRI brain with and without contrast.  He is agreeable to the plan and will notify our office if he has any questions or concerns in the meantime.       I spent a total of 30 minutes on the day of the visit.This includes face to face time and non-face to face time preparing to see the patient (eg, review of tests), obtaining and/or reviewing separately obtained history, documenting clinical information in the electronic or other health record, independently interpreting results and communicating results to the " patient/family/caregiver, or care coordinator.         [1]   Social History  Socioeconomic History    Marital status: Single   Tobacco Use    Smoking status: Never    Smokeless tobacco: Never   Substance and Sexual Activity    Alcohol use: No     Comment: occasionally    Drug use: No    Sexual activity: Never     Social Drivers of Health     Financial Resource Strain: Low Risk  (7/9/2025)    Overall Financial Resource Strain (CARDIA)     Difficulty of Paying Living Expenses: Not hard at all   Food Insecurity: No Food Insecurity (7/9/2025)    Hunger Vital Sign     Worried About Running Out of Food in the Last Year: Never true     Ran Out of Food in the Last Year: Never true   Transportation Needs: No Transportation Needs (7/9/2025)    PRAPARE - Transportation     Lack of Transportation (Medical): No     Lack of Transportation (Non-Medical): No   Physical Activity: Sufficiently Active (7/9/2025)    Exercise Vital Sign     Days of Exercise per Week: 4 days     Minutes of Exercise per Session: 60 min   Stress: No Stress Concern Present (7/9/2025)    Citizen of [place] [employer] - Occupational Stress Questionnaire     Feeling of Stress : Not at all   Housing Stability: Low Risk  (7/9/2025)    Housing Stability Vital Sign     Unable to Pay for Housing in the Last Year: No     Number of Times Moved in the Last Year: 0     Homeless in the Last Year: No   [2]   Current Outpatient Medications:     ascorbic acid, vitamin C, (VITAMIN C) 1000 MG tablet, Take 1,000 mg by mouth once daily., Disp: , Rfl:     Lactobacillus acidophilus (PROBIOTIC ORAL), Take 1 tablet by mouth once daily., Disp: , Rfl:     levETIRAcetam (KEPPRA) 750 MG Tab, Take 1 tablet (750 mg total) by mouth 2 (two) times daily., Disp: 180 tablet, Rfl: 3    levocetirizine (XYZAL) 5 MG tablet, Take 5 mg by mouth every evening., Disp: , Rfl:     multivitamin (THERAGRAN) per tablet, Take 1 tablet by mouth once daily., Disp: , Rfl:     ondansetron (ZOFRAN)  8 MG tablet, Take 8mg (1 tab) 30-45 minutes prior to chemotherapy dose and every 8 hours as needed, Disp: 30 tablet, Rfl: 3

## 2025-08-12 ENCOUNTER — HOSPITAL ENCOUNTER (OUTPATIENT)
Dept: RADIOLOGY | Facility: HOSPITAL | Age: 45
Discharge: HOME | End: 2025-08-12
Attending: FAMILY MEDICINE
Payer: COMMERCIAL

## 2025-08-12 DIAGNOSIS — N28.89 LEFT RENAL MASS: ICD-10-CM

## 2025-08-12 PROCEDURE — 76770 US EXAM ABDO BACK WALL COMP: CPT | Mod: 26,,, | Performed by: RADIOLOGY

## 2025-08-12 PROCEDURE — 76770 US EXAM ABDO BACK WALL COMP: CPT | Mod: TC

## (undated) DEVICE — Device

## (undated) DEVICE — COTTON BALLS 1/2IN

## (undated) DEVICE — DRESSING SURGICAL 1X1

## (undated) DEVICE — DURAPREP SURG SCRUB 26ML

## (undated) DEVICE — MARKERS SPHERZ PASSIVE

## (undated) DEVICE — BUR BONE CUT MICRO TPS 3X3.8MM

## (undated) DEVICE — PINS SKULL ADULT MAYFIELD
Type: IMPLANTABLE DEVICE | Site: CRANIAL | Status: NON-FUNCTIONAL
Removed: 2023-07-27

## (undated) DEVICE — DIFFUSER

## (undated) DEVICE — DRAPE T THYROID STERILE

## (undated) DEVICE — DRESSING SURGICAL 1X3

## (undated) DEVICE — SUT 4/0 18IN NUROLON BLK B

## (undated) DEVICE — TAPE SURG MEDIPORE 6X72IN

## (undated) DEVICE — BLADE SURG CARBON STEEL SZ11

## (undated) DEVICE — CORD BIPOLAR 12 FOOT

## (undated) DEVICE — ELECTRODE REM PLYHSV RETURN 9

## (undated) DEVICE — CONTAINER SPECIMEN STRL 4OZ

## (undated) DEVICE — DRESSING SURGICAL 1/2X1/2

## (undated) DEVICE — SPRAY MASTISOL

## (undated) DEVICE — HEMOSTAT SURGICEL 4X8IN

## (undated) DEVICE — MARKER SKIN STND TIP BLUE BARR

## (undated) DEVICE — CARTRIDGE OIL

## (undated) DEVICE — TRAY SKIN SCRUB WET PREMIUM

## (undated) DEVICE — RUBBERBAND STERILE 3X1/8IN

## (undated) DEVICE — TAPE MEDIPORE 4IN X 2YDS

## (undated) DEVICE — TUBE FRAZIER 5MM 2FT SOFT TIP

## (undated) DEVICE — COVER PROXIMA MAYO STAND

## (undated) DEVICE — BURR ROUTER TAPERED

## (undated) DEVICE — SUT CTD VICRYL BR CR/SH VIL

## (undated) DEVICE — TRAY NEURO OMC

## (undated) DEVICE — DRAPE OPMI STERILE

## (undated) DEVICE — HOOK STAY ELAS 5MM 8EA/PK

## (undated) DEVICE — SEALANT VISTASEAL FIBRIN 4ML

## (undated) DEVICE — SPONGE NEURO 1/4X1/4

## (undated) DEVICE — DRAPE SURGICAL STERI IRRG PCH

## (undated) DEVICE — TOWEL OR DISP STRL BLUE 4/PK

## (undated) DEVICE — ROUTER TAPERED 2.3MM

## (undated) DEVICE — BLADE CLIPPER NEURO / MDL 4411

## (undated) DEVICE — BURR ACORN 7.5MM

## (undated) DEVICE — DRESSING TELFA N ADH 3X8